# Patient Record
Sex: FEMALE | Race: WHITE | HISPANIC OR LATINO | Employment: FULL TIME | URBAN - METROPOLITAN AREA
[De-identification: names, ages, dates, MRNs, and addresses within clinical notes are randomized per-mention and may not be internally consistent; named-entity substitution may affect disease eponyms.]

---

## 2017-07-18 ENCOUNTER — TRANSCRIBE ORDERS (OUTPATIENT)
Dept: LAB | Facility: HOSPITAL | Age: 32
End: 2017-07-18

## 2017-07-18 ENCOUNTER — LAB (OUTPATIENT)
Dept: LAB | Facility: HOSPITAL | Age: 32
End: 2017-07-18

## 2017-07-18 DIAGNOSIS — Z32.01 PREGNANCY EXAMINATION OR TEST, POSITIVE RESULT: ICD-10-CM

## 2017-07-18 DIAGNOSIS — N91.2 ABSENCE OF MENSTRUATION: ICD-10-CM

## 2017-07-18 DIAGNOSIS — N91.2 ABSENCE OF MENSTRUATION: Primary | ICD-10-CM

## 2017-07-18 LAB
AMORPH URATE CRY URNS QL MICRO: ABNORMAL /HPF
BACTERIA UR QL AUTO: ABNORMAL /HPF
BILIRUB UR QL STRIP: NEGATIVE
CLARITY UR: ABNORMAL
COLOR UR: YELLOW
GLUCOSE UR STRIP-MCNC: NEGATIVE MG/DL
HCG INTACT+B SERPL-ACNC: NORMAL MIU/ML
HGB UR QL STRIP.AUTO: NEGATIVE
HYALINE CASTS UR QL AUTO: ABNORMAL /LPF
KETONES UR QL STRIP: ABNORMAL
LEUKOCYTE ESTERASE UR QL STRIP.AUTO: NEGATIVE
NITRITE UR QL STRIP: NEGATIVE
PH UR STRIP.AUTO: 5.5 [PH] (ref 5–8)
PROGEST SERPL-MCNC: 32.63 NG/ML
PROT UR QL STRIP: NEGATIVE
RBC # UR: ABNORMAL /HPF
REF LAB TEST METHOD: ABNORMAL
SP GR UR STRIP: 1.03 (ref 1–1.03)
SQUAMOUS #/AREA URNS HPF: ABNORMAL /HPF
UROBILINOGEN UR QL STRIP: ABNORMAL
WBC UR QL AUTO: ABNORMAL /HPF

## 2017-07-18 PROCEDURE — 84144 ASSAY OF PROGESTERONE: CPT | Performed by: NURSE PRACTITIONER

## 2017-07-18 PROCEDURE — 84702 CHORIONIC GONADOTROPIN TEST: CPT | Performed by: NURSE PRACTITIONER

## 2017-07-18 PROCEDURE — 81001 URINALYSIS AUTO W/SCOPE: CPT | Performed by: NURSE PRACTITIONER

## 2017-07-18 PROCEDURE — 87086 URINE CULTURE/COLONY COUNT: CPT | Performed by: NURSE PRACTITIONER

## 2017-07-18 PROCEDURE — 36415 COLL VENOUS BLD VENIPUNCTURE: CPT

## 2017-07-20 LAB — BACTERIA SPEC AEROBE CULT: NORMAL

## 2017-07-31 ENCOUNTER — LAB (OUTPATIENT)
Dept: LAB | Facility: HOSPITAL | Age: 32
End: 2017-07-31

## 2017-07-31 ENCOUNTER — TRANSCRIBE ORDERS (OUTPATIENT)
Dept: LAB | Facility: HOSPITAL | Age: 32
End: 2017-07-31

## 2017-07-31 DIAGNOSIS — Z34.81 PRENATAL CARE, SUBSEQUENT PREGNANCY, FIRST TRIMESTER: ICD-10-CM

## 2017-07-31 DIAGNOSIS — Z3A.13 13 WEEKS GESTATION OF PREGNANCY: ICD-10-CM

## 2017-07-31 DIAGNOSIS — Z3A.13 13 WEEKS GESTATION OF PREGNANCY: Primary | ICD-10-CM

## 2017-07-31 LAB
ABO GROUP BLD: NORMAL
AMPHET+METHAMPHET UR QL: NEGATIVE
AMPHETAMINES UR QL: NEGATIVE
BARBITURATES UR QL SCN: NEGATIVE
BASOPHILS # BLD AUTO: 0.01 10*3/MM3 (ref 0–0.2)
BASOPHILS NFR BLD AUTO: 0.1 % (ref 0–1)
BENZODIAZ UR QL SCN: NEGATIVE
BILIRUB UR QL STRIP: NEGATIVE
BLD GP AB SCN SERPL QL: NEGATIVE
BUPRENORPHINE SERPL-MCNC: NEGATIVE NG/ML
CANNABINOIDS SERPL QL: NEGATIVE
CLARITY UR: CLEAR
COCAINE UR QL: NEGATIVE
COLOR UR: YELLOW
DEPRECATED RDW RBC AUTO: 42.4 FL (ref 37–54)
EOSINOPHIL # BLD AUTO: 0.05 10*3/MM3 (ref 0–0.3)
EOSINOPHIL NFR BLD AUTO: 0.7 % (ref 0–3)
ERYTHROCYTE [DISTWIDTH] IN BLOOD BY AUTOMATED COUNT: 13.8 % (ref 11.3–14.5)
EXTERNAL ABO GROUPING: NORMAL
EXTERNAL ANTIBODY SCREEN: NEGATIVE
EXTERNAL CHLAMYDIA SCREEN: NEGATIVE
EXTERNAL GONORRHEA SCREEN: NEGATIVE
EXTERNAL HEPATITIS B SURFACE ANTIGEN: NEGATIVE
EXTERNAL HEPATITIS C AB: NON REACTIVE
EXTERNAL RH FACTOR: POSITIVE
EXTERNAL RUBELLA QUALITATIVE: NORMAL
EXTERNAL SYPHILIS RPR SCREEN: NORMAL
GLUCOSE BLD-MCNC: 76 MG/DL (ref 70–100)
GLUCOSE UR STRIP-MCNC: NEGATIVE MG/DL
HBV SURFACE AG SERPL QL IA: NORMAL
HCT VFR BLD AUTO: 38.9 % (ref 34.5–44)
HCV AB SER DONR QL: NORMAL
HGB BLD-MCNC: 12.9 G/DL (ref 11.5–15.5)
HGB UR QL STRIP.AUTO: NEGATIVE
HIV1 P24 AG SERPL QL IA: NORMAL
HIV1+2 AB SER QL: NORMAL
IMM GRANULOCYTES # BLD: 0.02 10*3/MM3 (ref 0–0.03)
IMM GRANULOCYTES NFR BLD: 0.3 % (ref 0–0.6)
KETONES UR QL STRIP: ABNORMAL
LEUKOCYTE ESTERASE UR QL STRIP.AUTO: NEGATIVE
LYMPHOCYTES # BLD AUTO: 1.67 10*3/MM3 (ref 0.6–4.8)
LYMPHOCYTES NFR BLD AUTO: 21.7 % (ref 24–44)
MCH RBC QN AUTO: 27.9 PG (ref 27–31)
MCHC RBC AUTO-ENTMCNC: 33.2 G/DL (ref 32–36)
MCV RBC AUTO: 84 FL (ref 80–99)
METHADONE UR QL SCN: NEGATIVE
MONOCYTES # BLD AUTO: 0.32 10*3/MM3 (ref 0–1)
MONOCYTES NFR BLD AUTO: 4.2 % (ref 0–12)
NEUTROPHILS # BLD AUTO: 5.62 10*3/MM3 (ref 1.5–8.3)
NEUTROPHILS NFR BLD AUTO: 73 % (ref 41–71)
NITRITE UR QL STRIP: NEGATIVE
OPIATES UR QL: NEGATIVE
OXYCODONE UR QL SCN: NEGATIVE
PCP UR QL SCN: NEGATIVE
PH UR STRIP.AUTO: 6 [PH] (ref 5–8)
PLATELET # BLD AUTO: 226 10*3/MM3 (ref 150–450)
PMV BLD AUTO: 12 FL (ref 6–12)
PROPOXYPH UR QL: NEGATIVE
PROT UR QL STRIP: NEGATIVE
RBC # BLD AUTO: 4.63 10*6/MM3 (ref 3.89–5.14)
RH BLD: POSITIVE
RUBV IGG SER QL: NORMAL
RUBV IGG SER-ACNC: 462.6 IU/ML
SP GR UR STRIP: 1.02 (ref 1–1.03)
TRICYCLICS UR QL SCN: NEGATIVE
UROBILINOGEN UR QL STRIP: ABNORMAL
WBC NRBC COR # BLD: 7.69 10*3/MM3 (ref 3.5–10.8)

## 2017-07-31 PROCEDURE — 87798 DETECT AGENT NOS DNA AMP: CPT | Performed by: OBSTETRICS & GYNECOLOGY

## 2017-07-31 PROCEDURE — 80081 OBSTETRIC PANEL INC HIV TSTG: CPT | Performed by: OBSTETRICS & GYNECOLOGY

## 2017-07-31 PROCEDURE — 87340 HEPATITIS B SURFACE AG IA: CPT | Performed by: OBSTETRICS & GYNECOLOGY

## 2017-07-31 PROCEDURE — 86803 HEPATITIS C AB TEST: CPT | Performed by: OBSTETRICS & GYNECOLOGY

## 2017-07-31 PROCEDURE — 36415 COLL VENOUS BLD VENIPUNCTURE: CPT | Performed by: OBSTETRICS & GYNECOLOGY

## 2017-07-31 PROCEDURE — 82947 ASSAY GLUCOSE BLOOD QUANT: CPT | Performed by: OBSTETRICS & GYNECOLOGY

## 2017-07-31 PROCEDURE — 86901 BLOOD TYPING SEROLOGIC RH(D): CPT | Performed by: OBSTETRICS & GYNECOLOGY

## 2017-07-31 PROCEDURE — 81003 URINALYSIS AUTO W/O SCOPE: CPT | Performed by: OBSTETRICS & GYNECOLOGY

## 2017-07-31 PROCEDURE — 86900 BLOOD TYPING SEROLOGIC ABO: CPT | Performed by: OBSTETRICS & GYNECOLOGY

## 2017-07-31 PROCEDURE — G0432 EIA HIV-1/HIV-2 SCREEN: HCPCS | Performed by: OBSTETRICS & GYNECOLOGY

## 2017-07-31 PROCEDURE — 84439 ASSAY OF FREE THYROXINE: CPT | Performed by: OBSTETRICS & GYNECOLOGY

## 2017-07-31 PROCEDURE — 85025 COMPLETE CBC W/AUTO DIFF WBC: CPT | Performed by: OBSTETRICS & GYNECOLOGY

## 2017-07-31 PROCEDURE — 86850 RBC ANTIBODY SCREEN: CPT | Performed by: OBSTETRICS & GYNECOLOGY

## 2017-07-31 PROCEDURE — 84481 FREE ASSAY (FT-3): CPT | Performed by: OBSTETRICS & GYNECOLOGY

## 2017-07-31 PROCEDURE — 80306 DRUG TEST PRSMV INSTRMNT: CPT | Performed by: OBSTETRICS & GYNECOLOGY

## 2017-07-31 PROCEDURE — 84479 ASSAY OF THYROID (T3 OR T4): CPT | Performed by: OBSTETRICS & GYNECOLOGY

## 2017-08-01 LAB
T3RU NFR SERPL: 23.2 % (ref 23–37)
T4 FREE SERPL-MCNC: 1.67 NG/DL (ref 0.89–1.76)
T4 SERPL-MCNC: 22.2 MCG/DL (ref 4.7–11.4)

## 2017-08-02 LAB
RPR SER QL: NORMAL
T3FREE SERPL-MCNC: 6.7 PG/ML (ref 2–4.4)
ZIKA VIRUS, NAA, SERUM: NEGATIVE
ZIKA VIRUS, NAA, URINE: NEGATIVE

## 2017-08-09 ENCOUNTER — OFFICE VISIT (OUTPATIENT)
Dept: INTERNAL MEDICINE | Facility: CLINIC | Age: 32
End: 2017-08-09

## 2017-08-09 VITALS
BODY MASS INDEX: 22.23 KG/M2 | SYSTOLIC BLOOD PRESSURE: 118 MMHG | DIASTOLIC BLOOD PRESSURE: 60 MMHG | WEIGHT: 130.2 LBS | OXYGEN SATURATION: 98 % | HEIGHT: 64 IN | TEMPERATURE: 97.8 F | RESPIRATION RATE: 16 BRPM | HEART RATE: 88 BPM

## 2017-08-09 DIAGNOSIS — Z76.89 ESTABLISHING CARE WITH NEW DOCTOR, ENCOUNTER FOR: Primary | ICD-10-CM

## 2017-08-09 DIAGNOSIS — Z3A.14 14 WEEKS GESTATION OF PREGNANCY: ICD-10-CM

## 2017-08-09 DIAGNOSIS — E05.00 GRAVES DISEASE: ICD-10-CM

## 2017-08-09 LAB — TSH SERPL DL<=0.05 MIU/L-ACNC: 0 MIU/ML (ref 0.35–5.35)

## 2017-08-09 PROCEDURE — 36415 COLL VENOUS BLD VENIPUNCTURE: CPT | Performed by: NURSE PRACTITIONER

## 2017-08-09 PROCEDURE — 84443 ASSAY THYROID STIM HORMONE: CPT | Performed by: NURSE PRACTITIONER

## 2017-08-09 PROCEDURE — 99203 OFFICE O/P NEW LOW 30 MIN: CPT | Performed by: NURSE PRACTITIONER

## 2017-08-09 RX ORDER — METHIMAZOLE 5 MG/1
TABLET ORAL
Refills: 0 | COMMUNITY
Start: 2017-05-25 | End: 2017-08-09 | Stop reason: DRUGHIGH

## 2017-08-09 RX ORDER — PROPYLTHIOURACIL 50 MG/1
100 TABLET ORAL DAILY
COMMUNITY
End: 2017-08-14 | Stop reason: DRUGHIGH

## 2017-08-09 NOTE — PROGRESS NOTES
Subjective   Estela Angela is a 31 y.o. female here to establish care.  Chief Complaint   Patient presents with   • Established Care     New patient.   • Referral for Endocrinology     Wants you to refer her to a clinic.  And would like to have a yearly physical done soon as well.  She is also pregnant again.       History of Present Illness     Graves disease: She was diagnoses a couple of years ago and  has been being seen by Dr. Memo Sawyer and treated with methimazole which has been effective. She is now 14 weeks pregnant and still taking the methimazole, but has not seen endo since she found out she was pregnant.  Her insurance has changed and Dr. Sawyer office does not accept Passport, so she would like to see another endocrinologist.  She denies tremor, weight loss, palpitations, anxiety, sleep disturbances, heat intolerance, or weakness.       She also has an 11 month old son that she is still breastfeeding.      Pregnant :14 weeks gestation.  Managed by Dr. Fuentes.      Health maintenance:   Influenza: 2016  Tdap: unknown- she will check on this and let me know  Pap: 7/2017     Tobacco use: denies  Eye exam: 2015- has one scheduled for this month  Dental exam: 7/2017      The following portions of the patient's history were reviewed and updated as appropriate: allergies, current medications, past family history, past medical history, past social history, past surgical history and problem list.    Review of Systems   Constitutional: Negative for activity change, appetite change, chills, fatigue, fever and unexpected weight change.   HENT: Negative for congestion, ear discharge, postnasal drip, sinus pressure and sore throat.    Eyes: Negative for discharge and visual disturbance.   Respiratory: Negative for cough and shortness of breath.    Cardiovascular: Negative for chest pain, palpitations and leg swelling.   Gastrointestinal: Negative for abdominal pain, constipation, diarrhea, nausea and vomiting.  "  Endocrine: Negative for cold intolerance, heat intolerance, polydipsia, polyphagia and polyuria.   Genitourinary: Positive for frequency. Negative for difficulty urinating, hematuria, urgency, vaginal bleeding and vaginal discharge.   Musculoskeletal: Negative for arthralgias, joint swelling and myalgias.   Skin: Negative for rash.   Neurological: Negative for dizziness, tremors, weakness, light-headedness and numbness.   Psychiatric/Behavioral: Negative for agitation, decreased concentration and sleep disturbance. The patient is not nervous/anxious.      Blood pressure 118/60, pulse 88, temperature 97.8 °F (36.6 °C), temperature source Temporal Artery , resp. rate 16, height 64\" (162.6 cm), weight 130 lb 3.2 oz (59.1 kg), SpO2 98 %, currently breastfeeding.    No Known Allergies  Past Medical History:   Diagnosis Date   • Graves disease      Past Surgical History:   Procedure Laterality Date   •  SECTION Bilateral 2016    Procedure:  SECTION PRIMARY;  Surgeon: Julissa Fuentes MD;  Location: [facility] LABOR DELIVERY;  Service:    • WISDOM TOOTH EXTRACTION       Family History   Problem Relation Age of Onset   • Diabetes Mother    • Hypertension Mother    • Hyperlipidemia Mother    • Other Father      MVA   • No Known Problems Sister    • No Known Problems Brother    • Diabetes Maternal Grandmother    • No Known Problems Maternal Grandfather    • Stroke Paternal Grandfather    • No Known Problems Son      11 months old     Social History     Social History   • Marital status:      Spouse name: N/A   • Number of children: N/A   • Years of education: N/A     Occupational History   •       Social History Main Topics   • Smoking status: Never Smoker   • Smokeless tobacco: Never Used   • Alcohol use No   • Drug use: No   • Sexual activity: Yes     Partners: Male     Birth control/ protection: None     Other Topics Concern   • Not on file     Social History Narrative   • No " narrative on file       There is no immunization history on file for this patient.    Current Outpatient Prescriptions:   •  Prenatal Vit-Fe Fumarate-FA (PRENATAL 27-1) 27-1 MG tablet tablet, Take 1 tablet by mouth daily., Disp: 90 tablet, Rfl: 3  •  propylthiouracil (PTU) 50 MG tablet, Take 100 mg by mouth 2 (Two) Times a Day., Disp: , Rfl:     Objective   Physical Exam   Constitutional: She is oriented to person, place, and time. She appears well-developed and well-nourished.   HENT:   Head: Normocephalic and atraumatic.   Eyes: EOM and lids are normal. Pupils are equal, round, and reactive to light.   No exopthalmos   Neck: Normal range of motion. No JVD present. No thyromegaly present.   Cardiovascular: Normal rate and regular rhythm.    Pulmonary/Chest: Effort normal and breath sounds normal. No respiratory distress.   Abdominal: Soft. Bowel sounds are normal. There is no tenderness.   Musculoskeletal: She exhibits no edema.   Lymphadenopathy:     She has no cervical adenopathy.   Neurological: She is alert and oriented to person, place, and time.   Skin: Skin is warm and dry.   Psychiatric: She has a normal mood and affect. Her behavior is normal.   Nursing note and vitals reviewed.      Assessment/Plan   Estela was seen today for established care and referral for endocrinology.    Diagnoses and all orders for this visit:    Establishing care with new doctor, encounter for    Graves disease  -     Ambulatory Referral to Endocrinology  -     TSH    14 weeks gestation of pregnancy        Outpatient Encounter Prescriptions as of 8/9/2017   Medication Sig Dispense Refill   • Prenatal Vit-Fe Fumarate-FA (PRENATAL 27-1) 27-1 MG tablet tablet Take 1 tablet by mouth daily. 90 tablet 3   • propylthiouracil (PTU) 50 MG tablet Take 100 mg by mouth 2 (Two) Times a Day.     • [DISCONTINUED] methimazole (TAPAZOLE) 10 MG tablet Take 10 mg by mouth daily. Takes 1 and 1/2 tabs daily     • [DISCONTINUED] docusate sodium 100 MG  capsule Take 100 mg by mouth 2 (two) times a day. 30 capsule 1   • [DISCONTINUED] ibuprofen (ADVIL,MOTRIN) 600 MG tablet Take 1 tablet by mouth every 8 (eight) hours as needed for mild pain (1-3). 60 tablet 1   • [DISCONTINUED] methIMAzole (TAPAZOLE) 5 MG tablet take 2 tablets by mouth once daily  0     No facility-administered encounter medications on file as of 8/9/2017.             Prenatal care to be continued by Dr. Fuentes. Continue prenatal vitamin.  Discussed hyperthyroid mgmt and pregnancy/breastfeeding with Dr. Memo Sawyer office.  They tell me that patient was supposed to be taking propylthiouracil (PTU) 50 mg 2 pills BID and not the methimazole.  Their goal was to keep her TSH at around 2.1 during pregnancy and to check her TSH every 2 months.  I also spoke with [facility]  and she does have a prescription already there for the PTU at this dose.  Spoke with Kitty at Dr. Fuentes's office.  They recommended referral to new endocrinologist, they are aware of her labs, and will follow her until she see's her new endocrinologist.   Will have her stop the methimazole and start the PTU.  I have referred her to a new endocrinologist. Labs were drawn at Dr Fuentes's office on 7/31/2017.  I have also added a TSH today as I do not see one resulted.    RTC 1 month for PE or sooner with any problems  Plan of care discussed with pt. She verbalized understanding and agreement.

## 2017-08-14 ENCOUNTER — OFFICE VISIT (OUTPATIENT)
Dept: ENDOCRINOLOGY | Facility: CLINIC | Age: 32
End: 2017-08-14

## 2017-08-14 ENCOUNTER — TELEPHONE (OUTPATIENT)
Dept: INTERNAL MEDICINE | Facility: CLINIC | Age: 32
End: 2017-08-14

## 2017-08-14 VITALS
HEIGHT: 64 IN | DIASTOLIC BLOOD PRESSURE: 68 MMHG | SYSTOLIC BLOOD PRESSURE: 118 MMHG | WEIGHT: 130 LBS | OXYGEN SATURATION: 100 % | BODY MASS INDEX: 22.2 KG/M2 | HEART RATE: 104 BPM

## 2017-08-14 DIAGNOSIS — E05.90 HYPERTHYROIDISM AFFECTING PREGNANCY IN SECOND TRIMESTER: Primary | ICD-10-CM

## 2017-08-14 DIAGNOSIS — O99.282 HYPERTHYROIDISM AFFECTING PREGNANCY IN SECOND TRIMESTER: Primary | ICD-10-CM

## 2017-08-14 DIAGNOSIS — E05.00 GRAVES DISEASE: ICD-10-CM

## 2017-08-14 PROCEDURE — 99243 OFF/OP CNSLTJ NEW/EST LOW 30: CPT | Performed by: INTERNAL MEDICINE

## 2017-08-14 RX ORDER — METHIMAZOLE 10 MG/1
10 TABLET ORAL DAILY
Qty: 30 TABLET | Refills: 5 | Status: SHIPPED | OUTPATIENT
Start: 2017-08-14 | End: 2017-11-29 | Stop reason: DRUGHIGH

## 2017-08-14 NOTE — PROGRESS NOTES
Chief Complaint   Patient presents with   • Graves' Disease     Consult for Dr. Fuentes     HPI:   Estela Angela is a 31 y.o.female sent in consultation by Julissa Fuentes MD for further evaluation of graves disease during pregnancy. Her history is as follows:    - pt's insurance no longer accepted by former endocrinologist. Here to establish care with new endocrinologist     1) Graves disease:   - first diagnosed in , by Dr. Merritt. Has been thionamide therapy (mainly methimazole) since . Presented with palpitations, weight loss, and tremor.  - In 2016 had her first pregnancy (took PTU during that pregnancy)  - Pt is currently 15 weeks pregnant with her second child. ALDO 2017. Was on methimazole for a few weeks in the first trimester.  Currently on  mg daily    Overall feels well  Review of Systems   Constitutional: Positive for fatigue.   HENT: Negative.    Eyes: Negative.    Respiratory: Negative.    Cardiovascular: Negative.    Gastrointestinal: Positive for nausea. Negative for vomiting.   Endocrine: Negative.    Genitourinary: Negative.    Musculoskeletal: Negative.    Skin: Negative.    Allergic/Immunologic: Negative.    Neurological: Negative.    Hematological: Negative.    Psychiatric/Behavioral: Negative.      Past Medical History:   Diagnosis Date   • Graves disease      family history includes Diabetes in her maternal grandmother and mother; Hyperlipidemia in her mother; Hypertension in her mother; No Known Problems in her brother, maternal grandfather, sister, and son; Other in her father; Stroke in her paternal grandfather.  Past Surgical History:   Procedure Laterality Date   •  SECTION Bilateral 2016    Procedure:  SECTION PRIMARY;  Surgeon: Julissa Fuentes MD;  Location: [facility] LABOR DELIVERY;  Service:    • WISDOM TOOTH EXTRACTION       Social History   Substance Use Topics   • Smoking status: Never Smoker   • Smokeless tobacco: Never Used   •  "Alcohol use No     Current Outpatient Prescriptions:   •  Prenatal Vit-Fe Fumarate-FA (PRENATAL 27-1) 27-1 MG tablet tablet, Take 1 tablet by mouth daily., Disp: 90 tablet, Rfl: 3  •   mg daily  No Known Allergies    /68  Pulse 104  Ht 64\" (162.6 cm)  Wt 130 lb (59 kg)  SpO2 100%  BMI 22.31 kg/m2  Physical Exam   Constitutional: She is oriented to person, place, and time. She appears well-developed. No distress.   HENT:   Head: Normocephalic.   Mouth/Throat: Oropharynx is clear and moist.   Eyes: Conjunctivae and EOM are normal. Pupils are equal, round, and reactive to light.   Neck: No tracheal deviation present. Thyromegaly (mild) present.   No palpable thyroid nodules     Cardiovascular: Normal rate, regular rhythm and normal heart sounds.    No murmur heard.  Pulmonary/Chest: Effort normal and breath sounds normal. No respiratory distress.   Abdominal:   Gravid uterus   Lymphadenopathy:     She has no cervical adenopathy.   Neurological: She is alert and oriented to person, place, and time. No cranial nerve deficit.   Skin: Skin is warm and dry. She is not diaphoretic. No erythema.   Psychiatric: She has a normal mood and affect. Her behavior is normal.   Vitals reviewed.    LABS/IMAGING: outside records reviewed and summarized in HPI  Results for orders placed or performed in visit on 08/09/17   TSH   Result Value Ref Range    TSH 0.004 (L) 0.350 - 5.350 mIU/mL     07/31/2017: free T4 1.67  07/31/2017: free T3 6.7 (2.0 - 4.4), T3 uptake 23.2 (23- 37%)    ASSESSMENT/PLAN:  1) hyperthyroidism affecting pregnancy in second trimester, 2) Graves disease:  - pt clinically euthyroid on exam  - discussed current American Thyroid Association Guidelines for management of hyperthyroidism during pregnancy. Recommend changing her thionamide to methimazole now that she is in the second trimester  - overall goal will be to keep her TSH in the low end of the normal range (< 2.00) during her pregnancy.   - " stop PTU and start methimazole 10 mg daily  - Reviewed potential (rare) side effects of methimazole and reviewed potential symptoms. Pt instructed to stop drug and call the clinic ASAP if these symptoms occur.     - pt to RTC in 8 weeks

## 2017-08-14 NOTE — TELEPHONE ENCOUNTER
Pharmacy had a question in regards to the patient's methimazole 10 mg. Pharmacy would like to get a call back at [phone]

## 2017-08-25 ENCOUNTER — APPOINTMENT (OUTPATIENT)
Dept: LAB | Facility: HOSPITAL | Age: 32
End: 2017-08-25

## 2017-08-25 ENCOUNTER — TRANSCRIBE ORDERS (OUTPATIENT)
Dept: LAB | Facility: HOSPITAL | Age: 32
End: 2017-08-25

## 2017-08-25 DIAGNOSIS — R30.0 DYSURIA: Primary | ICD-10-CM

## 2017-08-25 LAB
BACTERIA UR QL AUTO: ABNORMAL /HPF
BILIRUB UR QL STRIP: ABNORMAL
CLARITY UR: ABNORMAL
COLOR UR: ABNORMAL
GLUCOSE UR STRIP-MCNC: NEGATIVE MG/DL
HGB UR QL STRIP.AUTO: NEGATIVE
HYALINE CASTS UR QL AUTO: ABNORMAL /LPF
KETONES UR QL STRIP: ABNORMAL
LEUKOCYTE ESTERASE UR QL STRIP.AUTO: ABNORMAL
NITRITE UR QL STRIP: POSITIVE
PH UR STRIP.AUTO: <=5 [PH] (ref 5–8)
PROT UR QL STRIP: ABNORMAL
RBC # UR: ABNORMAL /HPF
REF LAB TEST METHOD: ABNORMAL
SP GR UR STRIP: 1.01 (ref 1–1.03)
SQUAMOUS #/AREA URNS HPF: ABNORMAL /HPF
UROBILINOGEN UR QL STRIP: ABNORMAL
WBC UR QL AUTO: ABNORMAL /HPF

## 2017-08-25 PROCEDURE — 81001 URINALYSIS AUTO W/SCOPE: CPT | Performed by: OBSTETRICS & GYNECOLOGY

## 2017-08-25 PROCEDURE — 87086 URINE CULTURE/COLONY COUNT: CPT | Performed by: OBSTETRICS & GYNECOLOGY

## 2017-08-27 LAB — BACTERIA SPEC AEROBE CULT: NORMAL

## 2017-09-13 ENCOUNTER — TELEPHONE (OUTPATIENT)
Dept: INTERNAL MEDICINE | Facility: CLINIC | Age: 32
End: 2017-09-13

## 2017-09-13 ENCOUNTER — OFFICE VISIT (OUTPATIENT)
Dept: INTERNAL MEDICINE | Facility: CLINIC | Age: 32
End: 2017-09-13

## 2017-09-13 VITALS
BODY MASS INDEX: 23.08 KG/M2 | HEART RATE: 85 BPM | DIASTOLIC BLOOD PRESSURE: 60 MMHG | WEIGHT: 135.2 LBS | HEIGHT: 64 IN | SYSTOLIC BLOOD PRESSURE: 116 MMHG | TEMPERATURE: 97.6 F | OXYGEN SATURATION: 99 %

## 2017-09-13 DIAGNOSIS — Z00.00 ANNUAL PHYSICAL EXAM: Primary | ICD-10-CM

## 2017-09-13 DIAGNOSIS — E05.00 GRAVES DISEASE: ICD-10-CM

## 2017-09-13 DIAGNOSIS — Z3A.19 19 WEEKS GESTATION OF PREGNANCY: ICD-10-CM

## 2017-09-13 DIAGNOSIS — Z23 NEED FOR VACCINATION: ICD-10-CM

## 2017-09-13 DIAGNOSIS — O26.812 PREGNANCY RELATED FATIGUE IN SECOND TRIMESTER: ICD-10-CM

## 2017-09-13 DIAGNOSIS — Z86.39 HISTORY OF HYPERLIPIDEMIA: ICD-10-CM

## 2017-09-13 LAB
25(OH)D3 SERPL-MCNC: 25.8 NG/ML
ALBUMIN SERPL-MCNC: 3.7 G/DL (ref 3.2–4.8)
ALBUMIN/GLOB SERPL: 1.4 G/DL (ref 1.5–2.5)
ALP SERPL-CCNC: 75 U/L (ref 25–100)
ALT SERPL W P-5'-P-CCNC: 10 U/L (ref 7–40)
ANION GAP SERPL CALCULATED.3IONS-SCNC: 6 MMOL/L (ref 3–11)
AST SERPL-CCNC: 11 U/L (ref 0–33)
BASOPHILS # BLD AUTO: 0.01 10*3/MM3 (ref 0–0.2)
BASOPHILS NFR BLD AUTO: 0.1 % (ref 0–1)
BILIRUB BLD-MCNC: NEGATIVE MG/DL
BILIRUB SERPL-MCNC: 0.4 MG/DL (ref 0.3–1.2)
BUN BLD-MCNC: 8 MG/DL (ref 9–23)
BUN/CREAT SERPL: 20 (ref 7–25)
CALCIUM SPEC-SCNC: 8.8 MG/DL (ref 8.7–10.4)
CHLORIDE SERPL-SCNC: 103 MMOL/L (ref 99–109)
CHOLEST SERPL-MCNC: 274 MG/DL (ref 0–200)
CLARITY, POC: CLEAR
CO2 SERPL-SCNC: 26 MMOL/L (ref 20–31)
COLOR UR: YELLOW
CREAT BLD-MCNC: 0.4 MG/DL (ref 0.6–1.3)
DEPRECATED RDW RBC AUTO: 43.5 FL (ref 37–54)
EOSINOPHIL # BLD AUTO: 0.12 10*3/MM3 (ref 0–0.3)
EOSINOPHIL NFR BLD AUTO: 1.4 % (ref 0–3)
ERYTHROCYTE [DISTWIDTH] IN BLOOD BY AUTOMATED COUNT: 13.6 % (ref 11.3–14.5)
GFR SERPL CREATININE-BSD FRML MDRD: >150 ML/MIN/1.73
GLOBULIN UR ELPH-MCNC: 2.6 GM/DL
GLUCOSE BLD-MCNC: 79 MG/DL (ref 70–100)
GLUCOSE UR STRIP-MCNC: NEGATIVE MG/DL
HCT VFR BLD AUTO: 36.1 % (ref 34.5–44)
HGB BLD-MCNC: 11.9 G/DL (ref 11.5–15.5)
IMM GRANULOCYTES # BLD: 0.04 10*3/MM3 (ref 0–0.03)
IMM GRANULOCYTES NFR BLD: 0.5 % (ref 0–0.6)
KETONES UR QL: NEGATIVE
LEUKOCYTE EST, POC: NEGATIVE
LYMPHOCYTES # BLD AUTO: 1.87 10*3/MM3 (ref 0.6–4.8)
LYMPHOCYTES NFR BLD AUTO: 21.6 % (ref 24–44)
MCH RBC QN AUTO: 28.7 PG (ref 27–31)
MCHC RBC AUTO-ENTMCNC: 33 G/DL (ref 32–36)
MCV RBC AUTO: 87.2 FL (ref 80–99)
MONOCYTES # BLD AUTO: 0.37 10*3/MM3 (ref 0–1)
MONOCYTES NFR BLD AUTO: 4.3 % (ref 0–12)
NEUTROPHILS # BLD AUTO: 6.25 10*3/MM3 (ref 1.5–8.3)
NEUTROPHILS NFR BLD AUTO: 72.1 % (ref 41–71)
NITRITE UR-MCNC: NEGATIVE MG/ML
PH UR: 8 [PH] (ref 5–8)
PLATELET # BLD AUTO: 230 10*3/MM3 (ref 150–450)
PMV BLD AUTO: 11.8 FL (ref 6–12)
POTASSIUM BLD-SCNC: 3.6 MMOL/L (ref 3.5–5.5)
PROT SERPL-MCNC: 6.3 G/DL (ref 5.7–8.2)
PROT UR STRIP-MCNC: NEGATIVE MG/DL
RBC # BLD AUTO: 4.14 10*6/MM3 (ref 3.89–5.14)
RBC # UR STRIP: NEGATIVE /UL
SODIUM BLD-SCNC: 135 MMOL/L (ref 132–146)
SP GR UR: 1.01 (ref 1–1.03)
TSH SERPL DL<=0.05 MIU/L-ACNC: <0.004 MIU/ML (ref 0.35–5.35)
UROBILINOGEN UR QL: NORMAL
VIT B12 BLD-MCNC: 339 PG/ML (ref 211–911)
WBC NRBC COR # BLD: 8.66 10*3/MM3 (ref 3.5–10.8)

## 2017-09-13 PROCEDURE — 82607 VITAMIN B-12: CPT | Performed by: NURSE PRACTITIONER

## 2017-09-13 PROCEDURE — 99395 PREV VISIT EST AGE 18-39: CPT | Performed by: NURSE PRACTITIONER

## 2017-09-13 PROCEDURE — 85025 COMPLETE CBC W/AUTO DIFF WBC: CPT | Performed by: NURSE PRACTITIONER

## 2017-09-13 PROCEDURE — 82306 VITAMIN D 25 HYDROXY: CPT | Performed by: NURSE PRACTITIONER

## 2017-09-13 PROCEDURE — 81003 URINALYSIS AUTO W/O SCOPE: CPT | Performed by: NURSE PRACTITIONER

## 2017-09-13 PROCEDURE — 82465 ASSAY BLD/SERUM CHOLESTEROL: CPT | Performed by: NURSE PRACTITIONER

## 2017-09-13 PROCEDURE — 80053 COMPREHEN METABOLIC PANEL: CPT | Performed by: NURSE PRACTITIONER

## 2017-09-13 PROCEDURE — 90471 IMMUNIZATION ADMIN: CPT | Performed by: NURSE PRACTITIONER

## 2017-09-13 PROCEDURE — 84443 ASSAY THYROID STIM HORMONE: CPT | Performed by: NURSE PRACTITIONER

## 2017-09-13 PROCEDURE — 90686 IIV4 VACC NO PRSV 0.5 ML IM: CPT | Performed by: NURSE PRACTITIONER

## 2017-09-13 RX ORDER — CALCIUM CITRATE, IRON PENTACARBONYL, CHOLECALCIFEROL, .ALPHA.-TOCOPHEROL, DL-, PYRIDOXINE HYDROCHLORIDE, FOLIC ACID, DOCUSATE SODIUM, AND DOCONEXENT 104; 27; 400; 30; 25; 1; 50; 260 MG/1; MG/1; [IU]/1; [IU]/1; MG/1; MG/1; MG/1; MG/1
1 CAPSULE, GELATIN COATED ORAL DAILY
Refills: 0 | COMMUNITY
Start: 2017-08-16 | End: 2017-10-11 | Stop reason: SDUPTHER

## 2017-09-13 NOTE — PROGRESS NOTES
Patient Care Team:  SHARON Rothman as PCP - General (Nurse Practitioner)  No Known Provider as PCP - Family Medicine  Julissa Fuentes MD as Consulting Physician (Obstetrics and Gynecology)  Vidya Sousa MD as Consulting Physician (Endocrinology)     Chief complaint: Patient is in today for a physical and has no acute complaints        Patient is a 32 y.o. female who presents for her yearly physical exam.     HPI     Pregnancy-19 Weeks gestation.  ALDO 2/6/2018.  This is her second pregnancy, it has been going well.  She is followed by Dr Clements; next appt is 9/29/17. She will have US at that appt.      Graves disease- diagnosed by Dr. Merritt in 2013 when she presented with palpitations, weight loss, and tremor.  Saw Dr Sousa  on 8/14/2017.  She changed her from PTU to Methimazole since she is now in her second trimester.  Goal id to keep TSH <2.0.  Her next appt  with her is 10/11/17.  Felling mildly fatigued, but good overall.     Diet: Has been trying to eat healthier, but she had a lot of nausea in the first trimester, has resolved now, and is having a lot of cravings for pop tarts and ramen noodles, and other junk foods.   Exercise: Occasional walks     Health maintenance:  Influenza: will update today   Tdap: will need updates at 27-36 weeks gestation per OB  Pap: 7/2017  Tobacco use:denies  Eye exam: 8/2017      Review of Systems   Constitutional: Positive for fatigue (mild). Negative for chills, diaphoresis and fever.   HENT: Negative for congestion, ear discharge, ear pain, postnasal drip, rhinorrhea, sinus pressure, sneezing, sore throat and trouble swallowing.    Eyes: Negative for discharge and visual disturbance.   Respiratory: Negative for cough, chest tightness, shortness of breath and wheezing.    Cardiovascular: Negative for chest pain, palpitations and leg swelling.   Gastrointestinal: Negative for abdominal pain, blood in stool, constipation, diarrhea, nausea and vomiting.   Endocrine:  Negative for cold intolerance, heat intolerance, polydipsia, polyphagia and polyuria.   Genitourinary: Negative for difficulty urinating, dysuria, flank pain, frequency, pelvic pain, vaginal bleeding, vaginal discharge and vaginal pain.   Musculoskeletal: Negative for arthralgias, joint swelling and myalgias.   Skin: Negative for pallor, rash and wound.   Neurological: Negative for dizziness, weakness, light-headedness and headaches.   Hematological: Negative for adenopathy. Does not bruise/bleed easily.   Psychiatric/Behavioral: Negative for decreased concentration, dysphoric mood, self-injury, sleep disturbance and suicidal ideas. The patient is not nervous/anxious.        History  Past Medical History:   Diagnosis Date   • Graves disease       Past Surgical History:   Procedure Laterality Date   •  SECTION Bilateral 2016    Procedure:  SECTION PRIMARY;  Surgeon: Julissa Fuentes MD;  Location: [facility] LABOR DELIVERY;  Service:    • WISDOM TOOTH EXTRACTION        No Known Allergies   Family History   Problem Relation Age of Onset   • Diabetes Mother    • Hypertension Mother    • Hyperlipidemia Mother    • Other Father      MVA   • No Known Problems Sister    • No Known Problems Brother    • Diabetes Maternal Grandmother    • No Known Problems Maternal Grandfather    • Stroke Paternal Grandfather    • No Known Problems Son      11 months old     Social History     Social History   • Marital status:      Spouse name: N/A   • Number of children: N/A   • Years of education: N/A     Occupational History   •       Social History Main Topics   • Smoking status: Never Smoker   • Smokeless tobacco: Never Used   • Alcohol use No   • Drug use: No   • Sexual activity: Yes     Partners: Male     Birth control/ protection: None     Other Topics Concern   • Not on file     Social History Narrative        Current Outpatient Prescriptions:   •  methIMAzole (TAPAZOLE) 10 MG tablet,  "Take 1 tablet by mouth Daily., Disp: 30 tablet, Rfl: 5  •  Prenat-FeFmCb-DSS-FA-DHA w/o A (CITRANATAL HARMONY) 27-1-260 MG capsule, Take 1 capsule by mouth Daily. Take for prenatal, Disp: , Rfl: 0   Immunization History   Administered Date(s) Administered   • Flu Vaccine Quad PF >36MO 09/13/2017                   /60  Pulse 85  Temp 97.6 °F (36.4 °C)  Ht 64\" (162.6 cm)  Wt 135 lb 3.2 oz (61.3 kg)  SpO2 99%  Breastfeeding? Yes  BMI 23.21 kg/m2      Physical Exam   Constitutional: She is oriented to person, place, and time. She appears well-developed and well-nourished. No distress.   HENT:   Head: Normocephalic and atraumatic.   Right Ear: Hearing, tympanic membrane, external ear and ear canal normal.   Left Ear: Hearing, tympanic membrane, external ear and ear canal normal.   Nose: Nose normal. Right sinus exhibits no maxillary sinus tenderness and no frontal sinus tenderness. Left sinus exhibits no maxillary sinus tenderness and no frontal sinus tenderness.   Mouth/Throat: Uvula is midline, oropharynx is clear and moist and mucous membranes are normal.   Eyes: Conjunctivae, EOM and lids are normal. Pupils are equal, round, and reactive to light.   No exopthalmos   Neck: Normal range of motion. No JVD present. Carotid bruit is not present. Thyromegaly (mild) present. No thyroid mass present.   Cardiovascular: Normal rate, regular rhythm and normal heart sounds.  PMI is not displaced.    Pulses:       Carotid pulses are 2+ on the right side, and 2+ on the left side.       Radial pulses are 2+ on the right side, and 2+ on the left side.        Femoral pulses are 2+ on the right side, and 2+ on the left side.       Popliteal pulses are 2+ on the right side, and 2+ on the left side.        Dorsalis pedis pulses are 2+ on the right side, and 2+ on the left side.        Posterior tibial pulses are 2+ on the right side, and 2+ on the left side.   Pulmonary/Chest: Effort normal and breath sounds normal. No " respiratory distress. Right breast exhibits no inverted nipple, no mass and no nipple discharge. Left breast exhibits no inverted nipple, no mass, no nipple discharge and no skin change. Breasts are symmetrical. There is no breast swelling.   Abdominal: Soft. Bowel sounds are normal. She exhibits no abdominal bruit. There is no hepatosplenomegaly. There is no tenderness. There is no CVA tenderness.   Gravid uterus, fundal height just below umbilicus   Genitourinary: There is breast tenderness. No breast discharge or bleeding.   Musculoskeletal: Normal range of motion. She exhibits no edema, tenderness or deformity.   Lymphadenopathy:     She has no cervical adenopathy.   Neurological: She is alert and oriented to person, place, and time. She has normal strength and normal reflexes. No cranial nerve deficit or sensory deficit. Coordination normal. She displays no Babinski's sign on the right side. She displays no Babinski's sign on the left side.   Skin: Skin is warm and dry.   Psychiatric: She has a normal mood and affect. Her behavior is normal.   Nursing note and vitals reviewed.              Diagnoses and all orders for this visit:    Annual physical exam  -     POCT urinalysis dipstick, automated  -     Comprehensive Metabolic Panel  -     Vitamin B12  -     Vitamin D 25 Hydroxy  -     Cholesterol, Total  -     CBC & Differential  -     CBC Auto Differential    Graves disease  -     TSH    19 weeks gestation of pregnancy    History of hyperlipidemia  -     Cholesterol, Total    Need for vaccination  -     Flu Vaccine Quad PF 3YR+    Pregnancy related fatigue in second trimester  -     Vitamin B12  -     Vitamin D 25 Hydroxy  -     CBC & Differential  -     CBC Auto Differential    Other orders  -     Prenat-FeFmCb-DSS-FA-DHA w/o A (CITRANATAL HARMONY) 27-1-260 MG capsule; Take 1 capsule by mouth Daily. Take for prenatal       Urine negative today  Labs sent  Immunizations and screenings: flu vaccine updated  today, will need Tdap at 27-36 weeks gestation, she will get with OB.  Counseling: diet and exercise  Continue to follow with Dr. Sousa for Graves and Dr. Clements for pregnancy  Follow up: 3 months or sooner with any problems    Hanna De La Vega, APRN   9/13/2017   10:52 AM

## 2017-09-13 NOTE — TELEPHONE ENCOUNTER
----- Message from SHARON Rothman sent at 9/13/2017  1:40 PM EDT -----  Please let her know labs looked pretty good except vit D a little low.  She can take 0473-4096 units  of OTC D3 daily.  Total cholesterol is a bit high.  She need to work on proper dietary choices and a little more light exercise.. We can recheck lipids after she delivers.  No meds for this during pregnancy.

## 2017-10-11 ENCOUNTER — OFFICE VISIT (OUTPATIENT)
Dept: ENDOCRINOLOGY | Facility: CLINIC | Age: 32
End: 2017-10-11

## 2017-10-11 VITALS
DIASTOLIC BLOOD PRESSURE: 60 MMHG | HEART RATE: 61 BPM | OXYGEN SATURATION: 99 % | BODY MASS INDEX: 24.04 KG/M2 | WEIGHT: 140.8 LBS | SYSTOLIC BLOOD PRESSURE: 100 MMHG | HEIGHT: 64 IN

## 2017-10-11 DIAGNOSIS — E05.90 HYPERTHYROIDISM AFFECTING PREGNANCY IN SECOND TRIMESTER: Primary | ICD-10-CM

## 2017-10-11 DIAGNOSIS — O99.282 HYPERTHYROIDISM AFFECTING PREGNANCY IN SECOND TRIMESTER: Primary | ICD-10-CM

## 2017-10-11 LAB
T4 FREE SERPL-MCNC: 1.17 NG/DL (ref 0.89–1.76)
TSH SERPL DL<=0.05 MIU/L-ACNC: <0.004 MIU/ML (ref 0.35–5.35)

## 2017-10-11 PROCEDURE — 84439 ASSAY OF FREE THYROXINE: CPT | Performed by: INTERNAL MEDICINE

## 2017-10-11 PROCEDURE — 84443 ASSAY THYROID STIM HORMONE: CPT | Performed by: INTERNAL MEDICINE

## 2017-10-11 PROCEDURE — 99213 OFFICE O/P EST LOW 20 MIN: CPT | Performed by: INTERNAL MEDICINE

## 2017-10-11 RX ORDER — MULTIVIT 47/IRON/FOLATE 1/DHA 27-1-300MG
CAPSULE ORAL
Refills: 0 | COMMUNITY
Start: 2017-10-02 | End: 2017-11-29

## 2017-10-11 NOTE — PROGRESS NOTES
"Chief Complaint   Patient presents with   • Hyperthyroidism     Follow Up     HPI:   Estela Angela is a 32 y.o.female who returns to Endocrine Clinic for f/u evaluation of graves disease during pregnancy. Last visit 08/14/2017. Her history is as follows:    Interim Events:   - currently 23 weeks gestation, ALDO 02/06/2017.   - overall feeling well. No complications with pregnancy to date.     1) Graves disease:   - first diagnosed in 2013, by Dr. Merritt. Has been on thionamide therapy intermittently (mainly methimazole) since 2013. Presented with palpitations, weight loss, and tremor.  - In 09/2016 had her first pregnancy (took PTU during that pregnancy)  - Was on methimazole for a few weeks in the first trimester. Was switched to PTU. At 15 weeks, I switched to methimazole per current guidelines.     Currently on Methimazole 10 mg daily. Rarely has missed a dose    Overall feels well  Review of Systems   Constitutional: Positive for fatigue (less than in first trimester).   HENT: Negative.    Eyes: Negative.    Respiratory: Negative.    Cardiovascular: Negative.    Gastrointestinal: Negative.  Negative for nausea and vomiting.   Endocrine: Negative.    Genitourinary: Negative.    Musculoskeletal: Negative.    Skin: Negative.    Allergic/Immunologic: Negative.    Neurological: Negative.    Hematological: Negative.    Psychiatric/Behavioral: Negative.      The following portions of the patient's history were reviewed and updated as appropriate: allergies, current medications, past family history, past medical history, past social history, past surgical history and problem list.    /60  Pulse 61  Ht 64\" (162.6 cm)  Wt 140 lb 12.8 oz (63.9 kg)  SpO2 99%  BMI 24.17 kg/m2  Physical Exam   Constitutional: She is oriented to person, place, and time. She appears well-developed. No distress.   HENT:   Head: Normocephalic.   Mouth/Throat: Oropharynx is clear and moist.   Eyes: Conjunctivae and EOM are normal. " Pupils are equal, round, and reactive to light.   Neck: No tracheal deviation present. Thyromegaly (mild) present.   No palpable thyroid nodules     Cardiovascular: Normal rate, regular rhythm and normal heart sounds.    No murmur heard.  Pulmonary/Chest: Effort normal and breath sounds normal. No respiratory distress.   Abdominal:   Gravid uterus   Lymphadenopathy:     She has no cervical adenopathy.   Neurological: She is alert and oriented to person, place, and time. No cranial nerve deficit.   Skin: Skin is warm and dry. She is not diaphoretic. No erythema.   Psychiatric: She has a normal mood and affect. Her behavior is normal.   Vitals reviewed.    LABS/IMAGING: outside records reviewed and summarized in HPI  Results for orders placed or performed in visit on 10/11/17   TSH   Result Value Ref Range    TSH <0.004 (L) 0.350 - 5.350 mIU/mL   T4, Free   Result Value Ref Range    Free T4 1.17 0.89 - 1.76 ng/dL     ASSESSMENT/PLAN:  1) hyperthyroidism affecting pregnancy in second trimester, 2) Graves disease:  - pt clinically euthyroid on exam  - overall goal will be to keep her TSH in the low end of the normal range (< 2.00) during her pregnancy.   - TFT's checked today were reviewed. Although TSH is suppressed her free T4 level is normal.  - continue methimazole 10 mg daily. Will not increase dose to avoid hypothyroidism.  - Reviewed potential (rare) side effects of methimazole and reviewed potential symptoms. Pt instructed to stop drug and call the clinic ASAP if these symptoms occur. Recent (09/2017) CBC and CMP WNL    - pt to RTC in 7 weeks

## 2017-11-14 ENCOUNTER — TELEPHONE (OUTPATIENT)
Dept: INTERNAL MEDICINE | Facility: CLINIC | Age: 32
End: 2017-11-14

## 2017-11-14 DIAGNOSIS — E05.00 GRAVES DISEASE: Primary | ICD-10-CM

## 2017-11-15 ENCOUNTER — TELEPHONE (OUTPATIENT)
Dept: ENDOCRINOLOGY | Facility: CLINIC | Age: 32
End: 2017-11-15

## 2017-11-15 ENCOUNTER — LAB (OUTPATIENT)
Dept: INTERNAL MEDICINE | Facility: CLINIC | Age: 32
End: 2017-11-15

## 2017-11-15 DIAGNOSIS — E05.00 GRAVES DISEASE: ICD-10-CM

## 2017-11-15 LAB
T4 FREE SERPL-MCNC: 0.81 NG/DL (ref 0.89–1.76)
TSH SERPL DL<=0.05 MIU/L-ACNC: 0.44 MIU/ML (ref 0.35–5.35)

## 2017-11-15 PROCEDURE — 84443 ASSAY THYROID STIM HORMONE: CPT | Performed by: INTERNAL MEDICINE

## 2017-11-15 PROCEDURE — 84439 ASSAY OF FREE THYROXINE: CPT | Performed by: INTERNAL MEDICINE

## 2017-11-15 NOTE — TELEPHONE ENCOUNTER
Spoke to patient about lab results. Instructed pt to decrease dose of methimazole to 5 mg daily. Has f/u with me 11/29/2017  Vidya Sousa MD

## 2017-11-21 ENCOUNTER — TRANSCRIBE ORDERS (OUTPATIENT)
Dept: LAB | Facility: HOSPITAL | Age: 32
End: 2017-11-21

## 2017-11-21 ENCOUNTER — LAB (OUTPATIENT)
Dept: LAB | Facility: HOSPITAL | Age: 32
End: 2017-11-21

## 2017-11-21 DIAGNOSIS — Z3A.28 28 WEEKS GESTATION OF PREGNANCY: ICD-10-CM

## 2017-11-21 DIAGNOSIS — Z34.83 PRENATAL CARE, SUBSEQUENT PREGNANCY, THIRD TRIMESTER: Primary | ICD-10-CM

## 2017-11-21 LAB
BLD GP AB SCN SERPL QL: NEGATIVE
DEPRECATED RDW RBC AUTO: 44.3 FL (ref 37–54)
ERYTHROCYTE [DISTWIDTH] IN BLOOD BY AUTOMATED COUNT: 13.6 % (ref 11.3–14.5)
GLUCOSE 1H P 100 G GLC PO SERPL-MCNC: 179 MG/DL (ref 65–199)
GLUCOSE BLDC GLUCOMTR-MCNC: 184 MG/DL
HCT VFR BLD AUTO: 34.2 % (ref 34.5–44)
HGB BLD-MCNC: 11.2 G/DL (ref 11.5–15.5)
MCH RBC QN AUTO: 29.1 PG (ref 27–31)
MCHC RBC AUTO-ENTMCNC: 32.7 G/DL (ref 32–36)
MCV RBC AUTO: 88.8 FL (ref 80–99)
PLATELET # BLD AUTO: 194 10*3/MM3 (ref 150–450)
PMV BLD AUTO: 11.4 FL (ref 6–12)
RBC # BLD AUTO: 3.85 10*6/MM3 (ref 3.89–5.14)
WBC NRBC COR # BLD: 10.66 10*3/MM3 (ref 3.5–10.8)

## 2017-11-21 PROCEDURE — 82962 GLUCOSE BLOOD TEST: CPT | Performed by: OBSTETRICS & GYNECOLOGY

## 2017-11-21 PROCEDURE — 86850 RBC ANTIBODY SCREEN: CPT | Performed by: OBSTETRICS & GYNECOLOGY

## 2017-11-21 PROCEDURE — 82950 GLUCOSE TEST: CPT

## 2017-11-21 PROCEDURE — 85027 COMPLETE CBC AUTOMATED: CPT | Performed by: OBSTETRICS & GYNECOLOGY

## 2017-11-21 PROCEDURE — 82962 GLUCOSE BLOOD TEST: CPT

## 2017-11-21 PROCEDURE — 36415 COLL VENOUS BLD VENIPUNCTURE: CPT

## 2017-11-29 ENCOUNTER — OFFICE VISIT (OUTPATIENT)
Dept: ENDOCRINOLOGY | Facility: CLINIC | Age: 32
End: 2017-11-29

## 2017-11-29 VITALS
DIASTOLIC BLOOD PRESSURE: 58 MMHG | HEART RATE: 75 BPM | WEIGHT: 145.8 LBS | HEIGHT: 64 IN | OXYGEN SATURATION: 100 % | BODY MASS INDEX: 24.89 KG/M2 | SYSTOLIC BLOOD PRESSURE: 100 MMHG

## 2017-11-29 DIAGNOSIS — E05.00 GRAVES DISEASE: ICD-10-CM

## 2017-11-29 DIAGNOSIS — E05.90 HYPERTHYROIDISM AFFECTING PREGNANCY IN THIRD TRIMESTER: Primary | ICD-10-CM

## 2017-11-29 DIAGNOSIS — O99.283 HYPERTHYROIDISM AFFECTING PREGNANCY IN THIRD TRIMESTER: Primary | ICD-10-CM

## 2017-11-29 LAB
T4 FREE SERPL-MCNC: 0.96 NG/DL (ref 0.89–1.76)
TSH SERPL DL<=0.05 MIU/L-ACNC: 0.36 MIU/ML (ref 0.35–5.35)

## 2017-11-29 PROCEDURE — 84439 ASSAY OF FREE THYROXINE: CPT | Performed by: INTERNAL MEDICINE

## 2017-11-29 PROCEDURE — 84443 ASSAY THYROID STIM HORMONE: CPT | Performed by: INTERNAL MEDICINE

## 2017-11-29 PROCEDURE — 99213 OFFICE O/P EST LOW 20 MIN: CPT | Performed by: INTERNAL MEDICINE

## 2017-11-29 RX ORDER — CALCIUM CITRATE, IRON PENTACARBONYL, CHOLECALCIFEROL, .ALPHA.-TOCOPHEROL, DL-, PYRIDOXINE HYDROCHLORIDE, FOLIC ACID, DOCUSATE SODIUM, AND DOCONEXENT 104; 27; 400; 30; 25; 1; 50; 260 MG/1; MG/1; [IU]/1; [IU]/1; MG/1; MG/1; MG/1; MG/1
CAPSULE, GELATIN COATED ORAL
Refills: 0 | COMMUNITY
Start: 2017-10-31 | End: 2018-12-17

## 2017-11-29 RX ORDER — METHIMAZOLE 5 MG/1
5 TABLET ORAL DAILY
Qty: 30 TABLET | Refills: 5 | Status: SHIPPED | OUTPATIENT
Start: 2017-11-29 | End: 2018-03-16

## 2017-11-29 NOTE — PROGRESS NOTES
Chief Complaint   Patient presents with   • Hyperthyroidism     Follow UP      HPI:   Estela Angela is a 32 y.o.female who [facility] for f/u evaluation of graves disease during pregnancy. Last visit 10/11/2017. Her history is as follows:    Interim Events:  - pt contacted me on 11/15/2017 for symptoms of fatigue. Checked TFT's which were now in the normal range. Asked pt to decrease her methimazole to 5 mg daily   - currently 30 weeks gestation, ALDO 2018, scheduled .   - overall feeling better since dose decrease. No complications with pregnancy to date.   - has 3 hr OGTT scheduled for this Friday, didn't pass 2 hr OGTT    1) Graves disease:   - first diagnosed in , by Dr. Merritt. Has been on thionamide therapy intermittently (mainly methimazole) since . Presented with palpitations, weight loss, and tremor.  - In 2016 had her first pregnancy (took PTU during that pregnancy). Postpartum was switched to methimazole.  - 18 months later, became pregnant with second child. Was on methimazole for a few weeks in the first trimester. Was switched to PTU. At 15 weeks, I switched to methimazole per current guidelines.     Currently on Methimazole 5 mg daily. no missed doses    Overall feels well  Review of Systems   Constitutional: Positive for fatigue.        Appropriate weight gain   HENT: Negative.    Eyes: Negative.    Respiratory: Negative.    Cardiovascular: Negative.    Gastrointestinal: Negative.  Negative for nausea and vomiting.   Endocrine: Negative.    Genitourinary: Negative.    Musculoskeletal: Negative.    Skin: Negative.    Allergic/Immunologic: Negative.    Neurological: Negative.    Hematological: Negative.    Psychiatric/Behavioral: Negative.      The following portions of the patient's history were reviewed and updated as appropriate: allergies, current medications, past family history, past medical history, past social history, past surgical history and  "problem list.    /58  Pulse 75  Ht 64\" (162.6 cm)  Wt 145 lb 12.8 oz (66.1 kg)  SpO2 100%  BMI 25.03 kg/m2  Physical Exam   Constitutional: She is oriented to person, place, and time. She appears well-developed. No distress.   HENT:   Head: Normocephalic.   Mouth/Throat: Oropharynx is clear and moist.   Eyes: Conjunctivae and EOM are normal. Pupils are equal, round, and reactive to light.   Neck: No tracheal deviation present. Thyromegaly (mild) present.   No palpable thyroid nodules     Cardiovascular: Normal rate, regular rhythm and normal heart sounds.    No murmur heard.  Pulmonary/Chest: Effort normal and breath sounds normal. No respiratory distress.   Abdominal:   Gravid uterus   Lymphadenopathy:     She has no cervical adenopathy.   Neurological: She is alert and oriented to person, place, and time. No cranial nerve deficit.   Skin: Skin is warm and dry. She is not diaphoretic. No erythema.   Psychiatric: She has a normal mood and affect. Her behavior is normal.   Vitals reviewed.    LABS/IMAGING: outside records reviewed and summarized in HPI  Results for orders placed or performed in visit on 11/29/17   TSH   Result Value Ref Range    TSH 0.363 0.350 - 5.350 mIU/mL   T4, Free   Result Value Ref Range    Free T4 0.96 0.89 - 1.76 ng/dL     ASSESSMENT/PLAN:  1) hyperthyroidism affecting pregnancy in third trimester, 2) Graves disease:  - pt clinically euthyroid on exam  - overall goal will be to keep her TSH in the low end of the normal range (< 2.00) during her pregnancy.   - TFT's checked today were WNL  - continue methimazole 5 mg daily.   -  Recent (09/2017) CBC and CMP WNL  - will have pt complete TFT's again 01/15/2018    RTC 6 - 8 weeks post partum                "

## 2017-12-01 ENCOUNTER — TRANSCRIBE ORDERS (OUTPATIENT)
Dept: LAB | Facility: HOSPITAL | Age: 32
End: 2017-12-01

## 2017-12-01 ENCOUNTER — LAB (OUTPATIENT)
Dept: LAB | Facility: HOSPITAL | Age: 32
End: 2017-12-01

## 2017-12-01 ENCOUNTER — TELEPHONE (OUTPATIENT)
Dept: ENDOCRINOLOGY | Facility: CLINIC | Age: 32
End: 2017-12-01

## 2017-12-01 DIAGNOSIS — Z34.83 PRENATAL CARE, SUBSEQUENT PREGNANCY, THIRD TRIMESTER: Primary | ICD-10-CM

## 2017-12-01 DIAGNOSIS — Z34.83 PRENATAL CARE, SUBSEQUENT PREGNANCY, THIRD TRIMESTER: ICD-10-CM

## 2017-12-01 LAB
GLUCOSE 1H P 100 G GLC PO SERPL-MCNC: 113 MG/DL (ref 65–199)
GLUCOSE 2H P 100 G GLC PO SERPL-MCNC: 103 MG/DL (ref 65–139)
GLUCOSE 3H P 100 G GLC PO SERPL-MCNC: 110 MG/DL (ref 65–109)
GLUCOSE BLDC GLUCOMTR-MCNC: 115 MG/DL
GLUCOSE BLDC-MCNC: 83 MG/DL (ref 75–125)
GLUCOSE P FAST SERPL-MCNC: 86 MG/DL (ref 65–99)

## 2017-12-01 PROCEDURE — 82952 GTT-ADDED SAMPLES: CPT

## 2017-12-01 PROCEDURE — 82962 GLUCOSE BLOOD TEST: CPT

## 2017-12-01 PROCEDURE — 82951 GLUCOSE TOLERANCE TEST (GTT): CPT

## 2017-12-01 PROCEDURE — 82962 GLUCOSE BLOOD TEST: CPT | Performed by: OBSTETRICS & GYNECOLOGY

## 2017-12-01 PROCEDURE — 36415 COLL VENOUS BLD VENIPUNCTURE: CPT

## 2017-12-21 ENCOUNTER — TRANSCRIBE ORDERS (OUTPATIENT)
Dept: LAB | Facility: HOSPITAL | Age: 32
End: 2017-12-21

## 2017-12-21 ENCOUNTER — LAB (OUTPATIENT)
Dept: LAB | Facility: HOSPITAL | Age: 32
End: 2017-12-21

## 2017-12-21 DIAGNOSIS — R50.9 HYPERTHERMIA-INDUCED DEFECT: ICD-10-CM

## 2017-12-21 DIAGNOSIS — R50.9 HYPERTHERMIA-INDUCED DEFECT: Primary | ICD-10-CM

## 2017-12-21 DIAGNOSIS — R52 ACHES: ICD-10-CM

## 2017-12-21 LAB
FLUAV AG NPH QL: POSITIVE
FLUBV AG NPH QL IA: NEGATIVE

## 2017-12-21 PROCEDURE — 87804 INFLUENZA ASSAY W/OPTIC: CPT

## 2018-01-09 LAB — EXTERNAL GROUP B STREP ANTIGEN: NEGATIVE

## 2018-01-19 ENCOUNTER — LAB (OUTPATIENT)
Dept: LAB | Facility: HOSPITAL | Age: 33
End: 2018-01-19

## 2018-01-19 DIAGNOSIS — E05.00 GRAVES DISEASE: ICD-10-CM

## 2018-01-19 DIAGNOSIS — E05.90 HYPERTHYROIDISM AFFECTING PREGNANCY IN THIRD TRIMESTER: ICD-10-CM

## 2018-01-19 DIAGNOSIS — O99.283 HYPERTHYROIDISM AFFECTING PREGNANCY IN THIRD TRIMESTER: ICD-10-CM

## 2018-01-19 LAB
T4 FREE SERPL-MCNC: 1.01 NG/DL (ref 0.89–1.76)
TSH SERPL DL<=0.05 MIU/L-ACNC: 0.9 MIU/ML (ref 0.35–5.35)

## 2018-01-19 PROCEDURE — 84443 ASSAY THYROID STIM HORMONE: CPT

## 2018-01-19 PROCEDURE — 84439 ASSAY OF FREE THYROXINE: CPT

## 2018-01-22 ENCOUNTER — TELEPHONE (OUTPATIENT)
Dept: ENDOCRINOLOGY | Facility: CLINIC | Age: 33
End: 2018-01-22

## 2018-01-23 NOTE — TELEPHONE ENCOUNTER
Spoke to patient about lab results. Pt to continue methimazole 5 mg daily and to stay on medication postpartum.  Vidya Sousa MD

## 2018-02-05 PROBLEM — Z34.90 TERM PREGNANCY: Status: ACTIVE | Noted: 2018-02-05

## 2018-02-05 RX ORDER — TRISODIUM CITRATE DIHYDRATE AND CITRIC ACID MONOHYDRATE 500; 334 MG/5ML; MG/5ML
30 SOLUTION ORAL ONCE
Status: DISCONTINUED | OUTPATIENT
Start: 2018-02-05 | End: 2018-02-07 | Stop reason: HOSPADM

## 2018-02-05 RX ORDER — CEFAZOLIN SODIUM 2 G/100ML
2 INJECTION, SOLUTION INTRAVENOUS ONCE
Status: DISCONTINUED | OUTPATIENT
Start: 2018-02-05 | End: 2018-02-07 | Stop reason: HOSPADM

## 2018-02-05 RX ORDER — SODIUM CHLORIDE 0.9 % (FLUSH) 0.9 %
1-10 SYRINGE (ML) INJECTION AS NEEDED
Status: DISCONTINUED | OUTPATIENT
Start: 2018-02-05 | End: 2018-02-07 | Stop reason: HOSPADM

## 2018-02-05 RX ORDER — SODIUM CHLORIDE, SODIUM LACTATE, POTASSIUM CHLORIDE, CALCIUM CHLORIDE 600; 310; 30; 20 MG/100ML; MG/100ML; MG/100ML; MG/100ML
125 INJECTION, SOLUTION INTRAVENOUS CONTINUOUS
Status: DISCONTINUED | OUTPATIENT
Start: 2018-02-05 | End: 2018-02-07

## 2018-02-05 RX ORDER — LIDOCAINE HYDROCHLORIDE 10 MG/ML
5 INJECTION, SOLUTION INFILTRATION; PERINEURAL AS NEEDED
Status: DISCONTINUED | OUTPATIENT
Start: 2018-02-05 | End: 2018-02-07 | Stop reason: HOSPADM

## 2018-02-06 ENCOUNTER — APPOINTMENT (OUTPATIENT)
Dept: PREADMISSION TESTING | Facility: HOSPITAL | Age: 33
End: 2018-02-06

## 2018-02-06 VITALS — BODY MASS INDEX: 26.72 KG/M2 | WEIGHT: 156.53 LBS | HEIGHT: 64 IN

## 2018-02-06 LAB
ABO GROUP BLD: NORMAL
BLD GP AB SCN SERPL QL: NEGATIVE
DEPRECATED RDW RBC AUTO: 48.6 FL (ref 37–54)
ERYTHROCYTE [DISTWIDTH] IN BLOOD BY AUTOMATED COUNT: 15.4 % (ref 11.3–14.5)
HCT VFR BLD AUTO: 36.6 % (ref 34.5–44)
HGB BLD-MCNC: 11.4 G/DL (ref 11.5–15.5)
MCH RBC QN AUTO: 26.9 PG (ref 27–31)
MCHC RBC AUTO-ENTMCNC: 31.1 G/DL (ref 32–36)
MCV RBC AUTO: 86.3 FL (ref 80–99)
PLATELET # BLD AUTO: 201 10*3/MM3 (ref 150–450)
PMV BLD AUTO: 12.5 FL (ref 6–12)
RBC # BLD AUTO: 4.24 10*6/MM3 (ref 3.89–5.14)
RH BLD: POSITIVE
WBC NRBC COR # BLD: 9.23 10*3/MM3 (ref 3.5–10.8)

## 2018-02-06 PROCEDURE — 86850 RBC ANTIBODY SCREEN: CPT | Performed by: OBSTETRICS & GYNECOLOGY

## 2018-02-06 PROCEDURE — 86900 BLOOD TYPING SEROLOGIC ABO: CPT | Performed by: OBSTETRICS & GYNECOLOGY

## 2018-02-06 PROCEDURE — 36415 COLL VENOUS BLD VENIPUNCTURE: CPT

## 2018-02-06 PROCEDURE — 86901 BLOOD TYPING SEROLOGIC RH(D): CPT | Performed by: OBSTETRICS & GYNECOLOGY

## 2018-02-06 PROCEDURE — 85027 COMPLETE CBC AUTOMATED: CPT | Performed by: OBSTETRICS & GYNECOLOGY

## 2018-02-06 NOTE — DISCHARGE INSTRUCTIONS
Dear Patient,    We are happy that you have chosen [facility] to have your baby.  We hope that by providing you with the following information, your childbirth experience will be a positive one.    What to know before your arrive:     -Do not eat, drink or chew gum after midnight the day before your procedure.    This also includes mints.   -Do not shave any part of your body including abdomen or pelvic are for two    days before your procedure.   -If you are taking a scheduled medication (insulin, blood pressure medicine,   antibiotics) please consult with your physician whether to take on the day of   surgery.   -Remove all jewelry including rings, wedding bands, and piercing before coming   to the hospital.   -Leave important valuables at home.   -Do not wear dark fingernail polish.   -Bring the following with you to the hospital:    -Picture ID and insurance, Medicare or Medicaid cards    -Co-pay/deductible required by insurance (Cash, Check, Credit Card)    -Copy of living will or power  document (if applicable)    -CPAP mask and tubing, not machine (if applicable)    -Skin prep instructions sheet    What to know the day of procedure:     -[facility], take elevator for first floor, exit to the right and  proceed through the doors to outside, follow the covered sidewalk to the  entrance of the [facility], follow the hallway and signs to the [facility],  enter the [facility] to your right BEFORE entering the 17[address] lobby.  Take the  elevators to the 3rd floor ([address]).   -Leave unnecessary items in your vehicle, including your suitcase.  Your support  person or a family member can get it for you after your procedure.   -Check in at the reception desk in the lobby of the 3rd floor ([address]).   -One person may accompany you to the pre-op/recovery area.  Please have  other family members wait in the waiting room.   -An anesthesiologist will meet with your prior to  your procedure.   -After anesthesia has been initiated, one person may accompany you in the  operating room.   -No video cameras are permitted in the operating room; only still cameras,  Please.      What to expect while you are in recovery:     -One person may stay with you while you are in recovery.   -If the baby is stable, he/she may visit to initiate breastfeeding & [facility].

## 2018-02-07 ENCOUNTER — ANESTHESIA EVENT (OUTPATIENT)
Dept: LABOR AND DELIVERY | Facility: HOSPITAL | Age: 33
End: 2018-02-07

## 2018-02-07 ENCOUNTER — HOSPITAL ENCOUNTER (INPATIENT)
Facility: HOSPITAL | Age: 33
LOS: 2 days | Discharge: HOME | End: 2018-02-09
Attending: OBSTETRICS & GYNECOLOGY | Admitting: OBSTETRICS & GYNECOLOGY

## 2018-02-07 ENCOUNTER — ANESTHESIA (OUTPATIENT)
Dept: LABOR AND DELIVERY | Facility: HOSPITAL | Age: 33
End: 2018-02-07

## 2018-02-07 LAB
ATMOSPHERIC PRESS: ABNORMAL MMHG
ATMOSPHERIC PRESS: ABNORMAL MMHG
BASE EXCESS BLDCOA CALC-SCNC: -11.2 MMOL/L (ref 0–2)
BASE EXCESS BLDCOV CALC-SCNC: -6.6 MMOL/L (ref 0–2)
BDY SITE: ABNORMAL
CO2 BLDA-SCNC: 23.3 MMOL/L (ref 22–33)
CO2 BLDA-SCNC: 23.9 MMOL/L (ref 22–33)
HCO3 BLDCOA-SCNC: 21.5 MMOL/L (ref 16.9–20.5)
HCO3 BLDCOV-SCNC: 21.7 MMOL/L (ref 18.6–21.4)
HGB BLDA-MCNC: 17.2 G/DL (ref 14–18)
HGB BLDA-MCNC: 17.6 G/DL (ref 14–18)
HOROWITZ INDEX BLD+IHG-RTO: 21 %
HOROWITZ INDEX BLD+IHG-RTO: 21 %
MODALITY: ABNORMAL
MODALITY: ABNORMAL
PCO2 BLDCOA: 78.9 MMHG (ref 43.3–54.9)
PCO2 BLDCOV: 52 MM HG (ref 30–60)
PH BLDCOA: 7.04 PH UNITS (ref 7.2–7.3)
PH BLDCOV: 7.23 PH UNITS (ref 7.19–7.46)
PO2 BLDCOA: 12.4 MMHG (ref 11.5–43.3)
PO2 BLDCOV: 20.4 MM HG
SAO2 % BLDCOA: 11.2 %
SAO2 % BLDCOA: ABNORMAL % (ref 45–75)
SAO2 % BLDCOV: 36.9 %

## 2018-02-07 PROCEDURE — 25010000003 CEFAZOLIN IN DEXTROSE 2-4 GM/100ML-% SOLUTION: Performed by: OBSTETRICS & GYNECOLOGY

## 2018-02-07 PROCEDURE — 25010000002 PHENYLEPHRINE PER 1 ML: Performed by: NURSE ANESTHETIST, CERTIFIED REGISTERED

## 2018-02-07 PROCEDURE — 82805 BLOOD GASES W/O2 SATURATION: CPT | Performed by: OBSTETRICS & GYNECOLOGY

## 2018-02-07 PROCEDURE — 59025 FETAL NON-STRESS TEST: CPT

## 2018-02-07 PROCEDURE — 25010000002 MIDAZOLAM PER 1 MG: Performed by: NURSE ANESTHETIST, CERTIFIED REGISTERED

## 2018-02-07 PROCEDURE — 25010000002 ONDANSETRON PER 1 MG: Performed by: NURSE ANESTHETIST, CERTIFIED REGISTERED

## 2018-02-07 PROCEDURE — 25010000003 MORPHINE PER 10 MG: Performed by: NURSE ANESTHETIST, CERTIFIED REGISTERED

## 2018-02-07 PROCEDURE — 25010000002 FENTANYL CITRATE (PF) 100 MCG/2ML SOLUTION: Performed by: NURSE ANESTHETIST, CERTIFIED REGISTERED

## 2018-02-07 PROCEDURE — 25010000002 ONDANSETRON PER 1 MG: Performed by: OBSTETRICS & GYNECOLOGY

## 2018-02-07 RX ORDER — CARBOPROST TROMETHAMINE 250 UG/ML
250 INJECTION, SOLUTION INTRAMUSCULAR AS NEEDED
Status: DISCONTINUED | OUTPATIENT
Start: 2018-02-07 | End: 2018-02-09 | Stop reason: HOSPADM

## 2018-02-07 RX ORDER — METHIMAZOLE 10 MG/1
5 TABLET ORAL DAILY
Status: DISCONTINUED | OUTPATIENT
Start: 2018-02-07 | End: 2018-02-09 | Stop reason: HOSPADM

## 2018-02-07 RX ORDER — MISOPROSTOL 200 UG/1
800 TABLET ORAL AS NEEDED
Status: DISCONTINUED | OUTPATIENT
Start: 2018-02-07 | End: 2018-02-09 | Stop reason: HOSPADM

## 2018-02-07 RX ORDER — DOCUSATE SODIUM 100 MG/1
100 CAPSULE, LIQUID FILLED ORAL 2 TIMES DAILY
Status: DISCONTINUED | OUTPATIENT
Start: 2018-02-07 | End: 2018-02-09 | Stop reason: HOSPADM

## 2018-02-07 RX ORDER — OXYTOCIN 10 [USP'U]/ML
INJECTION, SOLUTION INTRAMUSCULAR; INTRAVENOUS AS NEEDED
Status: DISCONTINUED | OUTPATIENT
Start: 2018-02-07 | End: 2018-02-07 | Stop reason: SURG

## 2018-02-07 RX ORDER — SODIUM CHLORIDE, SODIUM LACTATE, POTASSIUM CHLORIDE, CALCIUM CHLORIDE 600; 310; 30; 20 MG/100ML; MG/100ML; MG/100ML; MG/100ML
125 INJECTION, SOLUTION INTRAVENOUS ONCE
Status: DISCONTINUED | OUTPATIENT
Start: 2018-02-07 | End: 2018-02-07

## 2018-02-07 RX ORDER — CALCIUM CARBONATE 200(500)MG
2 TABLET,CHEWABLE ORAL 3 TIMES DAILY PRN
Status: DISCONTINUED | OUTPATIENT
Start: 2018-02-07 | End: 2018-02-09 | Stop reason: HOSPADM

## 2018-02-07 RX ORDER — IBUPROFEN 600 MG/1
600 TABLET ORAL EVERY 6 HOURS PRN
Status: COMPLETED | OUTPATIENT
Start: 2018-02-07 | End: 2018-02-07

## 2018-02-07 RX ORDER — SIMETHICONE 80 MG
80 TABLET,CHEWABLE ORAL 4 TIMES DAILY PRN
Status: DISCONTINUED | OUTPATIENT
Start: 2018-02-07 | End: 2018-02-09 | Stop reason: HOSPADM

## 2018-02-07 RX ORDER — LANOLIN 100 %
OINTMENT (GRAM) TOPICAL
Status: DISCONTINUED | OUTPATIENT
Start: 2018-02-07 | End: 2018-02-09 | Stop reason: HOSPADM

## 2018-02-07 RX ORDER — HYDROCODONE BITARTRATE AND ACETAMINOPHEN 5; 325 MG/1; MG/1
1 TABLET ORAL EVERY 4 HOURS PRN
Status: COMPLETED | OUTPATIENT
Start: 2018-02-07 | End: 2018-02-07

## 2018-02-07 RX ORDER — DIPHENHYDRAMINE HCL 25 MG
25 CAPSULE ORAL EVERY 4 HOURS PRN
Status: DISCONTINUED | OUTPATIENT
Start: 2018-02-07 | End: 2018-02-09 | Stop reason: HOSPADM

## 2018-02-07 RX ORDER — METHYLERGONOVINE MALEATE 0.2 MG/ML
200 INJECTION INTRAVENOUS ONCE AS NEEDED
Status: DISCONTINUED | OUTPATIENT
Start: 2018-02-07 | End: 2018-02-09 | Stop reason: HOSPADM

## 2018-02-07 RX ORDER — TRISODIUM CITRATE DIHYDRATE AND CITRIC ACID MONOHYDRATE 500; 334 MG/5ML; MG/5ML
30 SOLUTION ORAL ONCE
Status: COMPLETED | OUTPATIENT
Start: 2018-02-07 | End: 2018-02-07

## 2018-02-07 RX ORDER — ALUMINA, MAGNESIA, AND SIMETHICONE 2400; 2400; 240 MG/30ML; MG/30ML; MG/30ML
15 SUSPENSION ORAL EVERY 4 HOURS PRN
Status: DISCONTINUED | OUTPATIENT
Start: 2018-02-07 | End: 2018-02-09 | Stop reason: HOSPADM

## 2018-02-07 RX ORDER — CEFAZOLIN SODIUM 2 G/100ML
2 INJECTION, SOLUTION INTRAVENOUS ONCE
Status: COMPLETED | OUTPATIENT
Start: 2018-02-07 | End: 2018-02-07

## 2018-02-07 RX ORDER — OXYTOCIN/RINGER'S LACTATE 20/1000 ML
999 PLASTIC BAG, INJECTION (ML) INTRAVENOUS ONCE
Status: DISCONTINUED | OUTPATIENT
Start: 2018-02-07 | End: 2018-02-07 | Stop reason: HOSPADM

## 2018-02-07 RX ORDER — SIMETHICONE 80 MG
80 TABLET,CHEWABLE ORAL 4 TIMES DAILY
Status: DISCONTINUED | OUTPATIENT
Start: 2018-02-07 | End: 2018-02-09 | Stop reason: HOSPADM

## 2018-02-07 RX ORDER — MORPHINE SULFATE 0.5 MG/ML
INJECTION, SOLUTION EPIDURAL; INTRATHECAL; INTRAVENOUS AS NEEDED
Status: DISCONTINUED | OUTPATIENT
Start: 2018-02-07 | End: 2018-02-07 | Stop reason: SURG

## 2018-02-07 RX ORDER — PRENATAL VIT/IRON FUM/FOLIC AC 27MG-0.8MG
1 TABLET ORAL DAILY
Status: DISCONTINUED | OUTPATIENT
Start: 2018-02-07 | End: 2018-02-09 | Stop reason: HOSPADM

## 2018-02-07 RX ORDER — DIPHENHYDRAMINE HYDROCHLORIDE 50 MG/ML
25 INJECTION INTRAMUSCULAR; INTRAVENOUS EVERY 4 HOURS PRN
Status: DISCONTINUED | OUTPATIENT
Start: 2018-02-07 | End: 2018-02-09 | Stop reason: HOSPADM

## 2018-02-07 RX ORDER — IBUPROFEN 600 MG/1
600 TABLET ORAL EVERY 6 HOURS PRN
Status: DISCONTINUED | OUTPATIENT
Start: 2018-02-07 | End: 2018-02-09 | Stop reason: HOSPADM

## 2018-02-07 RX ORDER — ONDANSETRON 2 MG/ML
4 INJECTION INTRAMUSCULAR; INTRAVENOUS ONCE AS NEEDED
Status: DISCONTINUED | OUTPATIENT
Start: 2018-02-07 | End: 2018-02-07 | Stop reason: HOSPADM

## 2018-02-07 RX ORDER — MIDAZOLAM HYDROCHLORIDE 1 MG/ML
INJECTION INTRAMUSCULAR; INTRAVENOUS AS NEEDED
Status: DISCONTINUED | OUTPATIENT
Start: 2018-02-07 | End: 2018-02-07 | Stop reason: SURG

## 2018-02-07 RX ORDER — ONDANSETRON 4 MG/1
4 TABLET, FILM COATED ORAL EVERY 6 HOURS PRN
Status: DISCONTINUED | OUTPATIENT
Start: 2018-02-07 | End: 2018-02-09 | Stop reason: HOSPADM

## 2018-02-07 RX ORDER — BUPIVACAINE HYDROCHLORIDE 7.5 MG/ML
INJECTION, SOLUTION EPIDURAL; RETROBULBAR AS NEEDED
Status: DISCONTINUED | OUTPATIENT
Start: 2018-02-07 | End: 2018-02-07 | Stop reason: SURG

## 2018-02-07 RX ORDER — ONDANSETRON 2 MG/ML
INJECTION INTRAMUSCULAR; INTRAVENOUS AS NEEDED
Status: DISCONTINUED | OUTPATIENT
Start: 2018-02-07 | End: 2018-02-07 | Stop reason: SURG

## 2018-02-07 RX ORDER — OXYTOCIN/RINGER'S LACTATE 20/1000 ML
125 PLASTIC BAG, INJECTION (ML) INTRAVENOUS CONTINUOUS PRN
Status: DISCONTINUED | OUTPATIENT
Start: 2018-02-07 | End: 2018-02-07 | Stop reason: HOSPADM

## 2018-02-07 RX ORDER — NALOXONE HCL 0.4 MG/ML
0.4 VIAL (ML) INJECTION
Status: ACTIVE | OUTPATIENT
Start: 2018-02-07 | End: 2018-02-08

## 2018-02-07 RX ORDER — OXYCODONE HYDROCHLORIDE AND ACETAMINOPHEN 5; 325 MG/1; MG/1
2 TABLET ORAL EVERY 4 HOURS PRN
Status: DISCONTINUED | OUTPATIENT
Start: 2018-02-07 | End: 2018-02-09 | Stop reason: HOSPADM

## 2018-02-07 RX ORDER — ONDANSETRON 2 MG/ML
4 INJECTION INTRAMUSCULAR; INTRAVENOUS EVERY 6 HOURS PRN
Status: DISCONTINUED | OUTPATIENT
Start: 2018-02-07 | End: 2018-02-09 | Stop reason: HOSPADM

## 2018-02-07 RX ORDER — FENTANYL CITRATE 50 UG/ML
INJECTION, SOLUTION INTRAMUSCULAR; INTRAVENOUS AS NEEDED
Status: DISCONTINUED | OUTPATIENT
Start: 2018-02-07 | End: 2018-02-07 | Stop reason: SURG

## 2018-02-07 RX ADMIN — SODIUM CHLORIDE, POTASSIUM CHLORIDE, SODIUM LACTATE AND CALCIUM CHLORIDE 125 ML/HR: 600; 310; 30; 20 INJECTION, SOLUTION INTRAVENOUS at 10:28

## 2018-02-07 RX ADMIN — PHENYLEPHRINE HYDROCHLORIDE 100 MCG: 10 INJECTION INTRAVENOUS at 11:25

## 2018-02-07 RX ADMIN — HYDROCODONE BITARTRATE AND ACETAMINOPHEN 1 TABLET: 5; 325 TABLET ORAL at 13:55

## 2018-02-07 RX ADMIN — PHENYLEPHRINE HYDROCHLORIDE 100 MCG: 10 INJECTION INTRAVENOUS at 11:15

## 2018-02-07 RX ADMIN — MORPHINE SULFATE 0.15 MG: 0.5 INJECTION, SOLUTION EPIDURAL; INTRATHECAL; INTRAVENOUS at 11:09

## 2018-02-07 RX ADMIN — CEFAZOLIN SODIUM 2 G: 2 INJECTION, SOLUTION INTRAVENOUS at 10:50

## 2018-02-07 RX ADMIN — BUPIVACAINE HYDROCHLORIDE 1.4 ML: 7.5 INJECTION, SOLUTION EPIDURAL; RETROBULBAR at 11:09

## 2018-02-07 RX ADMIN — SODIUM CHLORIDE, POTASSIUM CHLORIDE, SODIUM LACTATE AND CALCIUM CHLORIDE: 600; 310; 30; 20 INJECTION, SOLUTION INTRAVENOUS at 11:07

## 2018-02-07 RX ADMIN — SODIUM CITRATE AND CITRIC ACID MONOHYDRATE 30 ML: 500; 334 SOLUTION ORAL at 10:56

## 2018-02-07 RX ADMIN — Medication 4 APPLICATION: at 18:14

## 2018-02-07 RX ADMIN — IBUPROFEN 600 MG: 600 TABLET, FILM COATED ORAL at 13:55

## 2018-02-07 RX ADMIN — EPHEDRINE SULFATE 10 MG: 50 INJECTION INTRAMUSCULAR; INTRAVENOUS; SUBCUTANEOUS at 11:11

## 2018-02-07 RX ADMIN — OXYTOCIN 30 UNITS: 10 INJECTION, SOLUTION INTRAMUSCULAR; INTRAVENOUS at 11:36

## 2018-02-07 RX ADMIN — FENTANYL CITRATE 85 MCG: 50 INJECTION, SOLUTION INTRAMUSCULAR; INTRAVENOUS at 11:42

## 2018-02-07 RX ADMIN — ONDANSETRON 4 MG: 2 INJECTION INTRAMUSCULAR; INTRAVENOUS at 11:01

## 2018-02-07 RX ADMIN — OXYCODONE AND ACETAMINOPHEN 2 TABLET: 5; 325 TABLET ORAL at 18:14

## 2018-02-07 RX ADMIN — EPHEDRINE SULFATE 10 MG: 50 INJECTION INTRAMUSCULAR; INTRAVENOUS; SUBCUTANEOUS at 11:47

## 2018-02-07 RX ADMIN — MIDAZOLAM HYDROCHLORIDE 1 MG: 1 INJECTION, SOLUTION INTRAMUSCULAR; INTRAVENOUS at 11:01

## 2018-02-07 RX ADMIN — FENTANYL CITRATE 15 MCG: 50 INJECTION, SOLUTION INTRAMUSCULAR; INTRAVENOUS at 11:09

## 2018-02-07 RX ADMIN — SODIUM CHLORIDE, POTASSIUM CHLORIDE, SODIUM LACTATE AND CALCIUM CHLORIDE: 600; 310; 30; 20 INJECTION, SOLUTION INTRAVENOUS at 11:36

## 2018-02-07 RX ADMIN — ONDANSETRON HYDROCHLORIDE 4 MG: 2 INJECTION, SOLUTION INTRAMUSCULAR; INTRAVENOUS at 20:54

## 2018-02-07 RX ADMIN — Medication 125 ML/HR: at 14:00

## 2018-02-07 NOTE — ANESTHESIA POSTPROCEDURE EVALUATION
Patient: Estela Angela    Procedure Summary     Date Anesthesia Start Anesthesia Stop Room / Location    18 1100 1218 [facility] LAB[facility] DELIV[facility]   [facility]       Procedure Diagnosis Surgeon Provider     SECTION REPEAT *39WKS* (N/A Abdomen) No diagnosis on file. MD Gregory Paige MD          Anesthesia Type: spinal, ITN  Last vitals  BP   110/61   Temp 97.6   Pulse 70   Resp 16   SpO2 99%     Post Anesthesia Care and Evaluation    Patient location during evaluation: bedside  Patient participation: complete - patient participated  Level of consciousness: awake and alert  Pain management: adequate  Airway patency: patent  Anesthetic complications: No anesthetic complications    Cardiovascular status: acceptable  Respiratory status: acceptable  Hydration status: acceptable

## 2018-02-07 NOTE — OP NOTE
Section Procedure Note    Etsela Angela    2017     Indications: 1. IUP at 40w1d   2. Grave's Disease  3. H/O previous C/S, failure of spontaneous labor to occur    Pre-operative Diagnosis: 40w1d    Post-operative Diagnosis: same    Findings: 1. VMI, Apgars 6,8, weight 8lbs 4oz. 2. Posterior placenta, intact. 3. Normal appearing uterus, bilateral tubes and ovaries.    Birth Information  YOB: 2017   Time of birth: 11:34 AM   Delivering clinician: Padmini Baxter   Sex: male   Delivery type: , Low Transverse   Breech type (if applicable):     Observed anomalies/comments:         Estimated Blood Loss:  800           Drains: Olea                 Specimens: None               Complications:  None; patient tolerated the procedure well.           Disposition: PACU - hemodynamically stable.           Condition: stable    Surgeon: Padmini Baxter MD    Assistants: Casimiro Stanley MD    Anesthesia: Spinal anesthesia    ASA Class: 1    Procedure Details   The patient was seen in the Holding Room. The risks, benefits, complications, treatment options, and expected outcomes were discussed with the patient.  The patient concurred with the proposed plan, giving informed consent.  The site of surgery was properly noted. The patient was taken to the Operating Room, identified as Estela Angela and the procedure verified as  Delivery. A Time Out was held and the above information confirmed.    After induction of anesthesia, the patient was draped and prepped in the usual sterile manner. A Pfannenstiel incision was made and carried down through the subcutaneous tissue to the fascia. A fascial incision was made and extended transversely. The fascia was  from the underlying rectus tissue superiorly and inferiorly. The peritoneum was identified and entered. The peritoneal incision was extended longitudinally.  The serosa was incised with Metzenbaum scissors and extended  laterally; bladder flap was created. Bladder blade was placed. A low transverse uterine incision was made. Delivered from vertex presentation was a Lowber Measurements  Weight (oz): 132.31    Length (in): 20.5    Head circumference (in):      Chest circumference (in):      with apgars scores of         APGARS  One minute Five minutes Ten minutes Fifteen minutes Twenty minutes   Skin color: 0   1             Heart rate: 2   2             Grimace: 1   2              Muscle tone: 1   1              Breathin   2              Totals: 6   8              . After the umbilical cord was clamped and cut, cord blood was obtained for evaluation. The placenta was removed intact and appeared normal. The uterine outline, tubes and ovaries appeared normal. The uterine incision was closed in a single layer with running locked sutures of 1 Vicryl. Hemostasis was observed. The fascia was then reapproximated with running sutures of 1 PDS stratafix. The subcutaneous tissue was reapproximated with 3-0 plain. The previous keloid scar was then excised with scalpel and electocautery. The skin was reapproximated with 4-0 monocryl and Skin glue.    Instrument, sponge, and needle counts were correct prior the abdominal closure and at the conclusion of the case.         Casimiro Stanley MD  12:30 PM  2018

## 2018-02-07 NOTE — LACTATION NOTE
"This note was copied from a baby's chart.     02/07/18 1505   Maternal Infant Assessment   Size Issue, Bilateral Breasts no   Shape, Bilateral Breasts round   Density, Bilateral Breasts soft   Nipples, Bilateral everted   Nipple Conditions, Bilateral tender;intact   Infant Assessment   Sucking Reflex present   Rooting Reflex present   Swallow Reflex present   LATCH Score   Latch 2-->grasps breast, tongue down, lips flanged, rhythmic sucking   Audible Swallowing 1-->a few with stimulation   Type Of Nipple 2-->everted (after stimulation)   Comfort (Breast/Nipple) 1-->filling, red/small blisters/bruises, mild/mod discomfort   Hold (Positioning) 1-->minimal assist, teach one side: mother does other, staff holds   Score (less than 7 for 2/more consecutive times, consult Lactation Consultant) 7   Maternal Infant Feeding   Previous Breastfeeding History yes   Infant Positioning clutch/\"football\"   Nipple Shape After Feeding, Left Breast pinched   Nipple Shape After Feeding, Right Breast pinched   Feeding Infant   Effective Latch During Feeding other (see comments)  (latch is shallow)   Equipment Type/Education   Breast Pump Type double electric, personal;other (see comments)  (gave script to obtain a new home pump)   Additional Equipment breast shields;other (see comments)  (patient requested a nipple shield)     "

## 2018-02-07 NOTE — PLAN OF CARE
Problem: Patient Care Overview (Adult)  Goal: Plan of Care Review  Outcome: Ongoing (interventions implemented as appropriate)   02/07/18 7777   Coping/Psychosocial Response Interventions   Plan Of Care Reviewed With patient   Patient Care Overview   Progress no change   Outcome Evaluation   Outcome Summary/Follow up Plan Infant latches shallowly and pinches patient's nipples. Helped her position infant better, but no improvement in latch seen. She requested a nipple shield and indicated it helped with painful latch. Her milk hand expresses easily and her nipples are everted. Infant's oral anatomy appears WNL. Patient will continue to use nipple shield, as needed, and continue to work for deeper latch.

## 2018-02-07 NOTE — ANESTHESIA PROCEDURE NOTES
Spinal Block    Indication:procedure for pain  Performed By  Anesthesiologist: DOUG MARSHALL  CRNA: MERCEDES STEPHENSON  Preanesthetic Checklist  Completed: patient identified, surgical consent, pre-op evaluation, timeout performed, IV checked, risks and benefits discussed and monitors and equipment checked  Spinal Block Prep:  Patient Position:sitting  Sterile Tech:cap, gloves, mask and sterile barriers  Prep:Betadine  Patient Monitoring:blood pressure monitoring, continuous pulse oximetry and EKG  Spinal Block Procedure  Approach:midline  Guidance:palpation technique  Location:L3-L4  Needle Type:Mcihel  Needle Gauge:25 G  Placement of Spinal needle event:cerebrospinal fluid aspirated  Paresthesia: no  Fluid Appearance:clear  Post Assessment  Patient Tolerance:patient tolerated the procedure well with no apparent complications  Complications no

## 2018-02-07 NOTE — PLAN OF CARE
Problem: Patient Care Overview (Adult)  Goal: Plan of Care Review  Outcome: Ongoing (interventions implemented as appropriate)   18 1308   Coping/Psychosocial Response Interventions   Plan Of Care Reviewed With patient     Goal: Adult Individualization and Mutuality  Outcome: Ongoing (interventions implemented as appropriate)   18 1308   Individualization   Patient Specific Preferences Pt. desires to breastfeed     Goal: Discharge Needs Assessment  Outcome: Ongoing (interventions implemented as appropriate)   18 1308   Discharge Needs Assessment   Concerns To Be Addressed denies needs/concerns at this time       Problem:  Delivery (Adult,Obstetrics,Pediatric)  Goal: Signs and Symptoms of Listed Potential Problems Will be Absent or Manageable ( Delivery)  Outcome: Outcome(s) achieved Date Met: 18 1308    Delivery   Problems Assessed ( Delivery) all   Problems Present ( Delivery) none

## 2018-02-07 NOTE — ANESTHESIA PREPROCEDURE EVALUATION
Anesthesia Evaluation     Patient summary reviewed and Nursing notes reviewed   no history of anesthetic complications:  NPO Solid Status: > 8 hours  NPO Liquid Status: > 8 hours           Airway   Mallampati: II  TM distance: >3 FB  Neck ROM: full  Dental      Pulmonary - negative pulmonary ROS   Cardiovascular - negative cardio ROS        Neuro/Psych- negative ROS  GI/Hepatic/Renal/Endo    (+)  hyperthyroidism (Graves disease-T4, TSH normal last checked 1-2018)    Musculoskeletal (-) negative ROS    Abdominal    Substance History - negative use     OB/GYN    (+) Pregnant,         Other - negative ROS                       Anesthesia Plan    ASA 2     spinal and ITN     Anesthetic plan and risks discussed with patient.

## 2018-02-07 NOTE — H&P
[facility]  Estela Figueroa  : 1985  MRN: [id number]  CSN: [id number]    History and Physical    Subjective   Estela Figueroa is a 32 y.o. year old  with an Estimated Date of Delivery: 18 scheduled for  delivery due to previous C/S - desired  but spontaenous onset of labor failed to occur.  Her placental location is posterior.  She is not planning for sterilization at the time of the .    Prenatal care has been with Dr. Baxter.  It has been complicated by history of Grave's disease, well controlled this pregnancy on methimazole..    Obstetric History       T0      L1     SAB0   TAB0   Ectopic0   Multiple0   Live Births1       # Outcome Date GA Lbr Darwin/2nd Weight Sex Delivery Anes PTL Lv   2 Current            1  16 35w6d  2653 g (5 lb 13.6 oz) M CS-LTranv Spinal N MIKE      Name: YUE FIGUEROA      Apgar1:  6                Apgar5: 8        Past Medical History:   Diagnosis Date   • Abnormal Pap smear of cervix    • Chlamydia    • Graves disease    • HPV (human papilloma virus) infection    • Urinary tract infection      Past Surgical History:   Procedure Laterality Date   •  SECTION Bilateral 2016    Procedure:  SECTION PRIMARY;  Surgeon: Julissa Fuentes MD;  Location: [facility] LABOR DELIVERY;  Service:    • WISDOM TOOTH EXTRACTION         Current Facility-Administered Medications:   •  ceFAZolin in dextrose (ANCEF) IVPB solution 2 g, 2 g, Intravenous, Once, Padmini Baxter MD  •  ceFAZolin in dextrose (ANCEF) IVPB solution 2 g, 2 g, Intravenous, Once, Padmini Baxter MD  •  lactated ringers bolus 1,000 mL, 1,000 mL, Intravenous, Once, Padmini Baxter MD  •  lactated ringers infusion, 125 mL/hr, Intravenous, Continuous, Padmini Baxter MD  •  lactated ringers infusion, 125 mL/hr, Intravenous, Once, Padmini Baxter MD  •  lidocaine (XYLOCAINE) 1 % injection 5 mL, 5 mL, Intradermal, PRN, Padmini Baxter MD  •   Sod Citrate-Citric Acid (BICITRA) solution 30 mL, 30 mL, Oral, Once, Padmini Baxter MD  •  Sod Citrate-Citric Acid (BICITRA) solution 30 mL, 30 mL, Oral, Once, Padmini Baxter MD  •  sodium chloride 0.9 % flush 1-10 mL, 1-10 mL, Intravenous, PRN, Padmini Baxter MD    No Known Allergies    Smoking status: Never Smoker                                                              Smokeless status: Never Used                        Review of Systems   Constitutional: Negative for fever.   Respiratory: Negative for shortness of breath.    Cardiovascular: Negative for chest pain and leg swelling.   Gastrointestinal: Negative for nausea and vomiting.   Genitourinary: Negative for dysuria and vaginal bleeding.   Neurological: Negative for headaches.         Objective   Breastfeeding? Yes  General: well developed; well nourished  no acute distress   Heart: regular rate and rhythm   Lungs: breathing is unlabored   Abdomen: soft, non-tender; no masses     Prenatal Labs  Lab Results   Component Value Date    HGB 11.4 (L) 2018    RUBELLAABIGG 462.6 2017    RUBELLAABIGG Immune 2017    HEPBSAG Non-Reactive 2017    LABRPR Non-reactive 2016    ABORH O Rh Positive 2016    ABSCRN Negative 2018    LABANTI Negative 2016    ETNGCWX93 NonReactive 2016    FOF1MPF8 Non-Reactive 2017    HEPCVIRUSABY non reactive 2017     2017    URINECX No growth at 2 days 2017       Recent Labs  Lab Results   Component Value Date    HGB 11.4 (L) 2018    HCT 36.6 2018    WBC 9.23 2018     2018           Assessment   1. IUP with an Estimated Date of Delivery: 18  2. Planned  section for previous C/S - desired  but spontaenous onset of labor failed to occur     Plan   1. Repeat   2. ABx and DVT prophylaxis    Casimiro Stanley MD  2018

## 2018-02-08 LAB
BASOPHILS # BLD AUTO: 0.01 10*3/MM3 (ref 0–0.2)
BASOPHILS NFR BLD AUTO: 0.1 % (ref 0–1)
DEPRECATED RDW RBC AUTO: 46.7 FL (ref 37–54)
EOSINOPHIL # BLD AUTO: 0.09 10*3/MM3 (ref 0–0.3)
EOSINOPHIL NFR BLD AUTO: 1.2 % (ref 0–3)
ERYTHROCYTE [DISTWIDTH] IN BLOOD BY AUTOMATED COUNT: 15 % (ref 11.3–14.5)
HCT VFR BLD AUTO: 33.3 % (ref 34.5–44)
HGB BLD-MCNC: 10.6 G/DL (ref 11.5–15.5)
IMM GRANULOCYTES # BLD: 0.02 10*3/MM3 (ref 0–0.03)
IMM GRANULOCYTES NFR BLD: 0.3 % (ref 0–0.6)
LYMPHOCYTES # BLD AUTO: 1.91 10*3/MM3 (ref 0.6–4.8)
LYMPHOCYTES NFR BLD AUTO: 25.5 % (ref 24–44)
MCH RBC QN AUTO: 27.2 PG (ref 27–31)
MCHC RBC AUTO-ENTMCNC: 31.8 G/DL (ref 32–36)
MCV RBC AUTO: 85.4 FL (ref 80–99)
MONOCYTES # BLD AUTO: 0.36 10*3/MM3 (ref 0–1)
MONOCYTES NFR BLD AUTO: 4.8 % (ref 0–12)
NEUTROPHILS # BLD AUTO: 5.1 10*3/MM3 (ref 1.5–8.3)
NEUTROPHILS NFR BLD AUTO: 68.1 % (ref 41–71)
PLATELET # BLD AUTO: 185 10*3/MM3 (ref 150–450)
PMV BLD AUTO: 11.6 FL (ref 6–12)
RBC # BLD AUTO: 3.9 10*6/MM3 (ref 3.89–5.14)
WBC NRBC COR # BLD: 7.49 10*3/MM3 (ref 3.5–10.8)

## 2018-02-08 PROCEDURE — 85025 COMPLETE CBC W/AUTO DIFF WBC: CPT | Performed by: OBSTETRICS & GYNECOLOGY

## 2018-02-08 RX ADMIN — PRENATAL VIT W/ FE FUMARATE-FA TAB 27-0.8 MG 1 TABLET: 27-0.8 TAB at 08:35

## 2018-02-08 RX ADMIN — SIMETHICONE CHEW TAB 80 MG 80 MG: 80 TABLET ORAL at 08:35

## 2018-02-08 RX ADMIN — IBUPROFEN 600 MG: 600 TABLET, FILM COATED ORAL at 16:38

## 2018-02-08 RX ADMIN — DOCUSATE SODIUM 100 MG: 100 CAPSULE, LIQUID FILLED ORAL at 08:35

## 2018-02-08 RX ADMIN — SIMETHICONE CHEW TAB 80 MG 80 MG: 80 TABLET ORAL at 22:04

## 2018-02-08 RX ADMIN — OXYCODONE AND ACETAMINOPHEN 2 TABLET: 5; 325 TABLET ORAL at 04:47

## 2018-02-08 RX ADMIN — IBUPROFEN 600 MG: 600 TABLET, FILM COATED ORAL at 22:04

## 2018-02-08 RX ADMIN — SIMETHICONE CHEW TAB 80 MG 80 MG: 80 TABLET ORAL at 16:38

## 2018-02-08 RX ADMIN — DOCUSATE SODIUM 100 MG: 100 CAPSULE, LIQUID FILLED ORAL at 22:04

## 2018-02-08 RX ADMIN — METHIMAZOLE 5 MG: 10 TABLET ORAL at 08:35

## 2018-02-08 RX ADMIN — IBUPROFEN 600 MG: 600 TABLET, FILM COATED ORAL at 08:35

## 2018-02-08 NOTE — ANESTHESIA POSTPROCEDURE EVALUATION
Patient: Estela Angela    Procedure Summary     Date Anesthesia Start Anesthesia Stop Room / Location    18 1100 1218 [facility] DEL[facility]   [facility]       Procedure Diagnosis Surgeon Provider     SECTION REPEAT *39WKS* (N/A Abdomen) No diagnosis on file. MD Gregory Paige MD          Anesthesia Type: spinal, ITN  Last vitals  BP   97/50 (18 07)   Temp   98.3 °F (36.8 °C) (18)   Pulse   73 (18 07)   Resp   18 (18 07)     SpO2   100 % (18 1345)     Post Anesthesia Care and Evaluation    Patient location during evaluation: bedside  Patient participation: complete - patient participated  Level of consciousness: awake and alert  Pain management: adequate  Airway patency: patent  Anesthetic complications: No anesthetic complications    Cardiovascular status: acceptable  Respiratory status: acceptable  Hydration status: acceptable  Post Neuraxial Block status: Motor and sensory function returned to baseline and No signs or symptoms of PDPH

## 2018-02-08 NOTE — LACTATION NOTE
This note was copied from a baby's chart.  Assisted with nursing.  Baby is sleepy and having difficulty latching. Continues to break latch and pop on and off nipple shield.  Formula used behind nipple shield and in baby's mouth to stimulate suck and assist with latching.  Baby finally did get and keep latch but was very sleepy at the breast.  Parents educated on ways to keep baby awake and nursing.

## 2018-02-08 NOTE — PROGRESS NOTES
2018    Name:Estela Angela    MR#:[id number]     PROGRESS NOTE:  Post-Op 1 S/P        Subjective   32 y.o. yo Female  s/p CS at 40w1d doing well. Pain well controlled, lochia appropriate    Patient Active Problem List   Diagnosis   • Single delivery by  section   • Graves disease   • Term pregnancy        Objective    Vitals  Temp:  Temp:  [97.4 °F (36.3 °C)-98.6 °F (37 °C)] 98.3 °F (36.8 °C)  Temp src: Oral  BP:  BP: ()/(42-91) 101/55  Pulse:  Heart Rate:  [55-94] 71  RR:   Resp:  [16] 16    General Awake, alert, no distress  Abdomen Soft, non-distended, fundus firm, below umbilicus, appropriately tender  Incision  Intact, no erythema or exudate  Extremities Calves NT bilaterally     I/O last 3 completed shifts:  In: 2000 [I.V.:2000]  Out: 4650 [Urine:3850; Blood:800]    LABS:   Lab Results   Component Value Date    WBC 9.23 2018    HGB 11.4 (L) 2018    HCT 36.6 2018    MCV 86.3 2018     2018       Infant: male       Assessment   1.  POD 1 from  Section     Plan: Doing well.    D/C iv, advance diet, may shower.  AM labs pending  Considering d/c home tomorrow        Padmini Baxter MD  2018 7:21 AM

## 2018-02-09 VITALS
RESPIRATION RATE: 14 BRPM | HEART RATE: 78 BPM | SYSTOLIC BLOOD PRESSURE: 110 MMHG | DIASTOLIC BLOOD PRESSURE: 70 MMHG | OXYGEN SATURATION: 100 % | TEMPERATURE: 98.5 F

## 2018-02-09 PROBLEM — Z98.891 S/P CESAREAN SECTION: Status: ACTIVE | Noted: 2018-02-09

## 2018-02-09 PROBLEM — E05.00 GRAVES DISEASE: Status: RESOLVED | Noted: 2017-08-09 | Resolved: 2018-02-09

## 2018-02-09 PROBLEM — Z34.90 TERM PREGNANCY: Status: RESOLVED | Noted: 2018-02-05 | Resolved: 2018-02-09

## 2018-02-09 RX ORDER — OXYCODONE HYDROCHLORIDE AND ACETAMINOPHEN 5; 325 MG/1; MG/1
1-2 TABLET ORAL EVERY 4 HOURS PRN
Qty: 40 TABLET | Refills: 0 | Status: SHIPPED | OUTPATIENT
Start: 2018-02-09 | End: 2018-02-17

## 2018-02-09 RX ORDER — PSEUDOEPHEDRINE HCL 30 MG
1 TABLET ORAL 2 TIMES DAILY
Qty: 60 CAPSULE | Refills: 0 | Status: SHIPPED | OUTPATIENT
Start: 2018-02-09 | End: 2018-12-17

## 2018-02-09 RX ORDER — IBUPROFEN 600 MG/1
600 TABLET ORAL EVERY 6 HOURS PRN
Qty: 40 TABLET | Refills: 0 | Status: SHIPPED | OUTPATIENT
Start: 2018-02-09 | End: 2018-12-17

## 2018-02-09 RX ADMIN — DOCUSATE SODIUM 100 MG: 100 CAPSULE, LIQUID FILLED ORAL at 08:46

## 2018-02-09 RX ADMIN — OXYCODONE AND ACETAMINOPHEN 2 TABLET: 5; 325 TABLET ORAL at 02:43

## 2018-02-09 RX ADMIN — METHIMAZOLE 5 MG: 10 TABLET ORAL at 08:46

## 2018-02-09 RX ADMIN — OXYCODONE AND ACETAMINOPHEN 1 TABLET: 5; 325 TABLET ORAL at 07:37

## 2018-02-09 RX ADMIN — PRENATAL VIT W/ FE FUMARATE-FA TAB 27-0.8 MG 1 TABLET: 27-0.8 TAB at 08:46

## 2018-02-09 RX ADMIN — IBUPROFEN 600 MG: 600 TABLET, FILM COATED ORAL at 07:37

## 2018-02-09 RX ADMIN — SIMETHICONE CHEW TAB 80 MG 80 MG: 80 TABLET ORAL at 08:46

## 2018-02-09 NOTE — DISCHARGE SUMMARY
[facility]   Discharge Summary      Patient: Estela Angela      MR#:[id number]  Admission  Diagnosis:   Problem List Items Addressed This Visit     RESOLVED: Single delivery by  section - Primary    Relevant Orders    CBC (No Diff) (Completed)    Type and screen (Completed)          Discharge Diagnosis:   1. Single delivery by  section        Date of Admission: 2018  Date of Discharge:  2018    Procedures:  , Low Transverse     2018    11:34 AM      Service:  Obstetrics    Hospital Course:  Patient underwent  section and remained in the hospital for 2 days.  During that time she remained afebrile and hemodynamically stable.  On the day of discharge, she was eating, ambulating and voiding without difficulty.      Labs  Lab Results   Component Value Date    WBC 7.49 2018    HGB 10.6 (L) 2018    HCT 33.3 (L) 2018    MCV 85.4 2018     2018    AST 11 2017    ALT 10 2017       Results from last 7 days  Lab Units 18  1308   ABO TYPING  O   RH TYPING  Positive   ANTIBODY SCREEN  Negative       Discharge Medications   Estela Angela   Home Medication Instructions LANA:[id number]    Printed on:18 0813   Medication Information                      docusate sodium 100 MG capsule  Take 100 mg by mouth 2 (Two) Times a Day.             ibuprofen (ADVIL,MOTRIN) 600 MG tablet  Take 1 tablet by mouth Every 6 (Six) Hours As Needed for Mild Pain .             methIMAzole (TAPAZOLE) 5 MG tablet  Take 1 tablet by mouth Daily.             oxyCODONE-acetaminophen (PERCOCET) 5-325 MG per tablet  Take 1-2 tablets by mouth Every 4 (Four) Hours As Needed for Moderate Pain  for up to 8 days.             Prenat-FeFmCb-DSS-FA-DHA w/o A (CITRANATAL HARMONY) 27-1-260 MG capsule                   Discharge Disposition:  To Home    Discharge Condition:  Stable    Discharge Diet: regular    Activity at Discharge: pelvic  rest    Follow-up Appointments  2 weeks    Padmini Baxter MD  02/09/18  8:13 AM

## 2018-02-09 NOTE — LACTATION NOTE
02/09/18 0827   Maternal Information   Person Making Referral other (see comments)  (Courtesy visit.)   Maternal Reason for Referral breastfeeding currently   Maternal Infant Assessment   Size Issue, Bilateral Breasts no   Shape, Bilateral Breasts round   Density, Bilateral Breasts soft   Additional Documentation (Latch) LATCH Score (Group)   LATCH Score   Latch 2-->grasps breast, tongue down, lips flanged, rhythmic sucking   Audible Swallowing 1-->a few with stimulation   Type Of Nipple 2-->everted (after stimulation)   Comfort (Breast/Nipple) 2-->soft/nontender   Hold (Positioning) 2-->no assist from staff, mother able to position/hold infant   Score (less than 7 for 2/more consecutive times, consult Lactation Consultant) 9   Feeding Infant   Effective Latch During Feeding yes   Audible Swallow yes   Suck/Swallow Coordination present

## 2018-02-09 NOTE — PLAN OF CARE
Problem: Patient Care Overview (Adult)  Goal: Plan of Care Review  Outcome: Ongoing (interventions implemented as appropriate)   02/09/18 0828   Coping/Psychosocial Response Interventions   Plan Of Care Reviewed With patient;significant other   Patient Care Overview   Progress progress toward functional goals as expected   Outcome Evaluation   Outcome Summary/Follow up Plan Continue to feed on demand and supplement per MD and pump. Baby latches well.

## 2018-02-09 NOTE — PROGRESS NOTES
2018    Name:Estela Angela    MR#:[id number]     PROGRESS NOTE:  Post-Op 2 S/P        Subjective   32 y.o. yo Female  s/p CS at 40w1d doing well. Pain well controlled, lochia appropriate, tolerating diet. Desires d/c home    Patient Active Problem List   Diagnosis   • S/P  section        Objective    Vitals  Temp:  Temp:  [97.8 °F (36.6 °C)-98.8 °F (37.1 °C)] 98.5 °F (36.9 °C)  Temp src: Oral  BP:  BP: ()/(51-70) 110/70  Pulse:  Heart Rate:  [65-80] 78  RR:   Resp:  [14-16] 14    General Awake, alert, no distress  Abdomen Soft, non-distended, fundus firm, below umbilicus, appropriately tender  Incision  Intact, no erythema or exudate  Extremities Calves NT bilaterally     I/O last 3 completed shifts:  In: -   Out: 4850 [Urine:4850]    LABS:   Lab Results   Component Value Date    WBC 7.49 2018    HGB 10.6 (L) 2018    HCT 33.3 (L) 2018    MCV 85.4 2018     2018       Infant: male       Assessment   1.  POD 2 from  Section    Plan: Doing well.    Desires d/c home.  Precautions reviewed.  RTC 2w for incision check          Padmini Baxter MD  2018 8:12 AM

## 2018-03-14 ENCOUNTER — OFFICE VISIT (OUTPATIENT)
Dept: ENDOCRINOLOGY | Facility: CLINIC | Age: 33
End: 2018-03-14

## 2018-03-14 VITALS
OXYGEN SATURATION: 99 % | BODY MASS INDEX: 23.86 KG/M2 | HEART RATE: 58 BPM | WEIGHT: 139 LBS | DIASTOLIC BLOOD PRESSURE: 60 MMHG | SYSTOLIC BLOOD PRESSURE: 102 MMHG

## 2018-03-14 DIAGNOSIS — E05.00 GRAVES' DISEASE: Primary | ICD-10-CM

## 2018-03-14 LAB
T4 FREE SERPL-MCNC: 1.15 NG/DL (ref 0.89–1.76)
TSH SERPL DL<=0.05 MIU/L-ACNC: 0.56 MIU/ML (ref 0.35–5.35)

## 2018-03-14 PROCEDURE — 99213 OFFICE O/P EST LOW 20 MIN: CPT | Performed by: INTERNAL MEDICINE

## 2018-03-14 PROCEDURE — 84443 ASSAY THYROID STIM HORMONE: CPT | Performed by: INTERNAL MEDICINE

## 2018-03-14 PROCEDURE — 84439 ASSAY OF FREE THYROXINE: CPT | Performed by: INTERNAL MEDICINE

## 2018-03-14 PROCEDURE — 83520 IMMUNOASSAY QUANT NOS NONAB: CPT | Performed by: INTERNAL MEDICINE

## 2018-03-14 NOTE — PROGRESS NOTES
Chief Complaint   Patient presents with   • Hyperthyrpoidism     follow up     HPI:   Estela Angela is a 32 y.o.female who [facility] for f/u evaluation of graves disease. Last visit 2017. Her history is as follows:    Interim Events:  - delivered 2018. . No post-op complications.  - Pt stopped her methimazole after delivery. Has felt well since.     1) Graves disease:   - first diagnosed in , by Dr. Merritt. Has been on thionamide therapy intermittently (mainly methimazole) since . Presented with palpitations, weight loss, and tremor.  - In 2016 had her first pregnancy (took PTU during that pregnancy). Postpartum was switched to methimazole.  - 18 months later, became pregnant with second child. Was on methimazole for a few weeks in the first trimester. Was switched to PTU. At 15 weeks, I switched to methimazole per current guidelines.     No methimazole since 2018  Overall feels well    Review of Systems   Constitutional: Positive for fatigue.        Appropriate weight loss   HENT: Negative.    Eyes: Negative.    Respiratory: Negative.    Cardiovascular: Negative.  Negative for palpitations.   Endocrine: Negative.    Genitourinary: Negative.    Musculoskeletal: Negative.    Skin: Negative.    Allergic/Immunologic: Negative.    Neurological: Negative.  Negative for tremors.   Hematological: Negative.    Psychiatric/Behavioral: Negative.         Postpartum depression resolved     The following portions of the patient's history were reviewed and updated as appropriate: allergies, current medications, past family history, past medical history, past social history, past surgical history and problem list.    /60   Pulse 58   Wt 63 kg (139 lb)   SpO2 99%   BMI 23.86 kg/m²   Physical Exam   Constitutional: She is oriented to person, place, and time. She appears well-developed. No distress.   HENT:   Head: Normocephalic.   Mouth/Throat: Oropharynx is clear  and moist.   Eyes: Conjunctivae and EOM are normal. Pupils are equal, round, and reactive to light.   Neck: No tracheal deviation present. No thyromegaly present.   No palpable thyroid nodules     Cardiovascular: Normal rate, regular rhythm and normal heart sounds.    No murmur heard.  Pulmonary/Chest: Effort normal and breath sounds normal. No respiratory distress.   Abdominal: Soft. Bowel sounds are normal. There is no tenderness.   Lymphadenopathy:     She has no cervical adenopathy.   Neurological: She is alert and oriented to person, place, and time. No cranial nerve deficit.   Skin: Skin is warm and dry. She is not diaphoretic. No erythema.   Psychiatric: She has a normal mood and affect. Her behavior is normal.   Vitals reviewed.    LABS/IMAGING: outside records reviewed and summarized in HPI  Results for orders placed or performed in visit on 03/14/18   TSH   Result Value Ref Range    TSH 0.562 0.350 - 5.350 mIU/mL   T4, Free   Result Value Ref Range    Free T4 1.15 0.89 - 1.76 ng/dL   Thyrotropin Receptor Antibody   Result Value Ref Range    Thyrotropin Receptor Antibody <0.50 0.00 - 1.75 IU/L     ASSESSMENT/PLAN:  1) Graves disease:  - pt clinically euthyroid on exam, off methimazole 02/07/18  - TFT's checked today were WNL  - TSH receptor Ab negative  - Pt possibly in remission    - continue to stay off methimazole   - will check TFTs at follow-up    RTC 4 months

## 2018-03-16 LAB — TSH RECEP AB SER-ACNC: <0.5 IU/L (ref 0–1.75)

## 2018-07-13 DIAGNOSIS — R00.2 PALPITATIONS: Primary | ICD-10-CM

## 2018-08-02 ENCOUNTER — LAB (OUTPATIENT)
Dept: LAB | Facility: HOSPITAL | Age: 33
End: 2018-08-02

## 2018-08-02 DIAGNOSIS — R00.2 PALPITATIONS: ICD-10-CM

## 2018-08-02 LAB
T4 FREE SERPL-MCNC: 2.92 NG/DL (ref 0.89–1.76)
TSH SERPL DL<=0.05 MIU/L-ACNC: <0.004 MIU/ML (ref 0.35–5.35)

## 2018-08-02 PROCEDURE — 84439 ASSAY OF FREE THYROXINE: CPT

## 2018-08-02 PROCEDURE — 84443 ASSAY THYROID STIM HORMONE: CPT

## 2018-08-02 PROCEDURE — 84480 ASSAY TRIIODOTHYRONINE (T3): CPT

## 2018-08-03 ENCOUNTER — TELEPHONE (OUTPATIENT)
Dept: ENDOCRINOLOGY | Facility: CLINIC | Age: 33
End: 2018-08-03

## 2018-08-03 DIAGNOSIS — E05.00 GRAVES' DISEASE: Primary | ICD-10-CM

## 2018-08-03 LAB — T3 SERPL-MCNC: 316 NG/DL (ref 71–180)

## 2018-08-03 RX ORDER — METHIMAZOLE 5 MG/1
5 TABLET ORAL DAILY
Qty: 30 TABLET | Refills: 5 | Status: SHIPPED | OUTPATIENT
Start: 2018-08-03 | End: 2018-12-17 | Stop reason: DRUGHIGH

## 2018-08-03 NOTE — TELEPHONE ENCOUNTER
Spoke to patient about lab results. Will restart methimazole 5 mg daily. I have moved her  Clinic appt from 08/06 to 12/2018.  Vidya Sousa MD

## 2018-12-10 DIAGNOSIS — E05.00 GRAVES' DISEASE: Primary | ICD-10-CM

## 2018-12-14 ENCOUNTER — LAB (OUTPATIENT)
Dept: INTERNAL MEDICINE | Facility: CLINIC | Age: 33
End: 2018-12-14

## 2018-12-14 DIAGNOSIS — E05.00 GRAVES' DISEASE: ICD-10-CM

## 2018-12-14 LAB
T4 FREE SERPL-MCNC: 5.16 NG/DL (ref 0.89–1.76)
TSH SERPL DL<=0.05 MIU/L-ACNC: <0.004 MIU/ML (ref 0.35–5.35)

## 2018-12-14 PROCEDURE — 84443 ASSAY THYROID STIM HORMONE: CPT | Performed by: INTERNAL MEDICINE

## 2018-12-14 PROCEDURE — 84439 ASSAY OF FREE THYROXINE: CPT | Performed by: INTERNAL MEDICINE

## 2018-12-17 ENCOUNTER — OFFICE VISIT (OUTPATIENT)
Dept: ENDOCRINOLOGY | Facility: CLINIC | Age: 33
End: 2018-12-17

## 2018-12-17 VITALS
HEIGHT: 64 IN | DIASTOLIC BLOOD PRESSURE: 60 MMHG | OXYGEN SATURATION: 99 % | BODY MASS INDEX: 22.53 KG/M2 | HEART RATE: 101 BPM | WEIGHT: 132 LBS | SYSTOLIC BLOOD PRESSURE: 110 MMHG

## 2018-12-17 DIAGNOSIS — E05.00 GRAVES' DISEASE: Primary | ICD-10-CM

## 2018-12-17 PROCEDURE — 99213 OFFICE O/P EST LOW 20 MIN: CPT | Performed by: INTERNAL MEDICINE

## 2018-12-17 RX ORDER — METHIMAZOLE 10 MG/1
TABLET ORAL
Qty: 90 TABLET | Refills: 0 | Status: SHIPPED | OUTPATIENT
Start: 2018-12-17 | End: 2019-02-22 | Stop reason: SDUPTHER

## 2018-12-17 NOTE — PROGRESS NOTES
"Chief Complaint   Patient presents with   • Graves' Disease     f/u      HPI:   Estela Angela is a 33 y.o.female who returns to [facility] for f/u evaluation of graves disease. Last visit 03/14/2018. Her history is as follows:    Interim Events:  - pt now 10 months postpartum  - restarted methimazole 5 mg dialy in 08/2018  - has noted palpitations, tremor since August 1) Graves disease:   - first diagnosed in 2013, by Dr. Merritt. Has been on thionamide therapy intermittently (mainly methimazole) since 2013. Presented with palpitations, weight loss, and tremor.  - In 09/2016 had her first pregnancy (took PTU during that pregnancy). Postpartum was switched to methimazole.  - 18 months later, became pregnant with second child. Was on methimazole for a few weeks in the first trimester. Was switched to PTU. At 15 weeks, I switched to methimazole per current guidelines.   - pt stopped the methimazole post-partum in 02/07/2018, TSH Rec Ab <0.50 in 03/2018  - restarted methimazole 5 mg dialy in 08/2018: TSH <0.004, free T4 2.92 (0.89 - 1.76), T3 316    Current Dose: methimazole 5 mg daily    Review of Systems   Constitutional: Positive for fatigue.        Weight loss   HENT: Negative.    Eyes: Negative.    Respiratory: Negative.    Cardiovascular: Positive for palpitations.   Gastrointestinal: Negative.  Negative for diarrhea.   Endocrine: Positive for heat intolerance.   Genitourinary: Negative.    Musculoskeletal: Negative.    Skin: Negative.    Allergic/Immunologic: Negative.    Neurological: Positive for tremors.   Hematological: Negative.    Psychiatric/Behavioral: Positive for sleep disturbance.        Postpartum depression resolved     The following portions of the patient's history were reviewed and updated as appropriate: allergies, current medications, past family history, past medical history, past social history, past surgical history and problem list.    /60   Pulse 101   Ht 162.6 cm (64\")  "  Wt 59.9 kg (132 lb)   SpO2 99%   Breastfeeding? Yes   BMI 22.66 kg/m²   Physical Exam   Constitutional: She is oriented to person, place, and time. She appears well-developed. No distress.   HENT:   Head: Normocephalic.   Mouth/Throat: Oropharynx is clear and moist.   Eyes: Conjunctivae and EOM are normal. Pupils are equal, round, and reactive to light.   Neck: No tracheal deviation present. Thyromegaly (40 gm gland) present.   No palpable thyroid nodules     Cardiovascular: Regular rhythm and normal heart sounds. Tachycardia present.   No murmur heard.  Pulmonary/Chest: Effort normal and breath sounds normal. No respiratory distress.   Abdominal: Soft. Bowel sounds are normal. There is no tenderness.   Lymphadenopathy:     She has no cervical adenopathy.   Neurological: She is alert and oriented to person, place, and time. No cranial nerve deficit.   Tremor in hands   Skin: Skin is warm and dry. She is not diaphoretic. No erythema.   Psychiatric: She has a normal mood and affect. Her behavior is normal.   Vitals reviewed.    LABS/IMAGING: outside records reviewed and summarized in HPI  Results for orders placed or performed in visit on 12/14/18   T4, Free   Result Value Ref Range    Free T4 5.16 (H) 0.89 - 1.76 ng/dL   TSH   Result Value Ref Range    TSH <0.004 (L) 0.350 - 5.350 mIU/mL     ASSESSMENT/PLAN:  1) Graves disease:  - pt clinically mildly thyrotoxic on exam, TFT's checked shows worsening hyperthyroidism on the methimazole 5 mg daily  - Increase methimazole to 20 mg daily X 1 month, then decrease to 10 mg daily. Pt then to have TFT's checked in 2 months, will adjust dose as indicated.  - pt to call if having side effect from medication or having any concerning symptoms    RTC 5 months

## 2019-02-22 ENCOUNTER — LAB (OUTPATIENT)
Dept: LAB | Facility: HOSPITAL | Age: 34
End: 2019-02-22

## 2019-02-22 ENCOUNTER — TELEPHONE (OUTPATIENT)
Dept: ENDOCRINOLOGY | Facility: CLINIC | Age: 34
End: 2019-02-22

## 2019-02-22 DIAGNOSIS — E05.00 GRAVES' DISEASE: ICD-10-CM

## 2019-02-22 DIAGNOSIS — E05.00 GRAVES' DISEASE: Primary | ICD-10-CM

## 2019-02-22 LAB
T4 FREE SERPL-MCNC: 3.31 NG/DL (ref 0.89–1.76)
TSH SERPL DL<=0.05 MIU/L-ACNC: <0.004 MIU/ML (ref 0.35–5.35)

## 2019-02-22 PROCEDURE — 84439 ASSAY OF FREE THYROXINE: CPT

## 2019-02-22 PROCEDURE — 84443 ASSAY THYROID STIM HORMONE: CPT

## 2019-02-22 RX ORDER — METHIMAZOLE 10 MG/1
20 TABLET ORAL DAILY
Qty: 60 TABLET | Refills: 5 | Status: SHIPPED | OUTPATIENT
Start: 2019-02-22 | End: 2019-05-24

## 2019-02-22 NOTE — TELEPHONE ENCOUNTER
Spoke to patient about lab results. Advised pt to take methimazole 20 mg daily until hr f/u visit with me. New Rx sent to pt's pharmacy.   Vidya Sousa MD

## 2019-04-18 DIAGNOSIS — E05.00 GRAVES' DISEASE: Primary | ICD-10-CM

## 2019-04-19 ENCOUNTER — LAB (OUTPATIENT)
Dept: LAB | Facility: HOSPITAL | Age: 34
End: 2019-04-19

## 2019-04-19 DIAGNOSIS — E05.00 GRAVES' DISEASE: ICD-10-CM

## 2019-04-19 LAB
T4 FREE SERPL-MCNC: 1.94 NG/DL (ref 0.93–1.7)
TSH SERPL DL<=0.05 MIU/L-ACNC: <0.005 MIU/ML (ref 0.27–4.2)

## 2019-04-19 PROCEDURE — 84443 ASSAY THYROID STIM HORMONE: CPT

## 2019-04-19 PROCEDURE — 36415 COLL VENOUS BLD VENIPUNCTURE: CPT

## 2019-04-19 PROCEDURE — 84439 ASSAY OF FREE THYROXINE: CPT

## 2019-05-22 ENCOUNTER — OFFICE VISIT (OUTPATIENT)
Dept: ENDOCRINOLOGY | Facility: CLINIC | Age: 34
End: 2019-05-22

## 2019-05-22 VITALS
WEIGHT: 136 LBS | BODY MASS INDEX: 23.22 KG/M2 | DIASTOLIC BLOOD PRESSURE: 78 MMHG | HEIGHT: 64 IN | OXYGEN SATURATION: 99 % | HEART RATE: 79 BPM | SYSTOLIC BLOOD PRESSURE: 100 MMHG

## 2019-05-22 DIAGNOSIS — E05.00 GRAVES' DISEASE: Primary | ICD-10-CM

## 2019-05-22 PROCEDURE — 84439 ASSAY OF FREE THYROXINE: CPT | Performed by: INTERNAL MEDICINE

## 2019-05-22 PROCEDURE — 99214 OFFICE O/P EST MOD 30 MIN: CPT | Performed by: INTERNAL MEDICINE

## 2019-05-22 PROCEDURE — 84443 ASSAY THYROID STIM HORMONE: CPT | Performed by: INTERNAL MEDICINE

## 2019-05-22 NOTE — PROGRESS NOTES
Chief Complaint   Patient presents with   • Graves' Disease     f/u      HPI:   Estela Angela is a 33 y.o.female who returns to [facility] for f/u evaluation of graves disease. Last visit 12/17/2018. Her history is as follows:    Interim Events:  - had to increase her methimazole again in 02/2019  - currently breastfeeding  - would like to pursue surgery for definitive treatment of her graves    1) Graves disease:   - first diagnosed in 2013, by Dr. Merritt. Has been on thionamide therapy intermittently (mainly methimazole) since 2013. Presented with palpitations, weight loss, and tremor.  - In 09/2016 had her first pregnancy (took PTU during that pregnancy). Postpartum was switched to methimazole.  - 18 months later, became pregnant with second child. Was on methimazole for a few weeks in the first trimester. Was switched to PTU. At 15 weeks, I switched to methimazole per current guidelines.   - pt stopped the methimazole post-partum in 02/07/2018, TSH Rec Ab <0.50 in 03/2018  - restarted methimazole 5 mg dialy in 08/2018: TSH <0.004, free T4 2.92 (0.89 - 1.76), T3 316  - had to increase her dose in 02/2019    Current Dose: methimazole 20 mg daily    Review of Systems   Constitutional: Positive for fatigue.        Weight stable   HENT: Negative.    Eyes: Negative.    Respiratory: Negative.    Cardiovascular: Positive for palpitations.   Gastrointestinal: Negative.  Negative for diarrhea.   Endocrine: Negative for heat intolerance.   Genitourinary: Negative.    Musculoskeletal: Negative.    Skin: Negative.    Allergic/Immunologic: Negative.    Neurological: Negative for tremors.   Hematological: Negative.    Psychiatric/Behavioral: Negative for sleep disturbance.        Anxiety     The following portions of the patient's history were reviewed and updated as appropriate: allergies, current medications, past family history, past medical history, past social history, past surgical history and problem  "list.    /78   Pulse 79   Ht 162.6 cm (64\")   Wt 61.7 kg (136 lb)   SpO2 99%   BMI 23.34 kg/m²   Physical Exam   Constitutional: She is oriented to person, place, and time. She appears well-developed. No distress.   HENT:   Head: Normocephalic.   Mouth/Throat: Oropharynx is clear and moist.   Eyes: Conjunctivae and EOM are normal. Pupils are equal, round, and reactive to light.   Neck: No tracheal deviation present. Thyromegaly (40 gm gland) present.   No palpable thyroid nodules     Cardiovascular: Normal rate, regular rhythm and normal heart sounds.   No murmur heard.  Pulmonary/Chest: Effort normal and breath sounds normal. No respiratory distress.   Abdominal: Soft. Bowel sounds are normal. There is no tenderness.   Lymphadenopathy:     She has no cervical adenopathy.   Neurological: She is alert and oriented to person, place, and time. No cranial nerve deficit.   No Tremor in hands   Skin: Skin is warm and dry. She is not diaphoretic. No erythema.   Psychiatric: She has a normal mood and affect. Her behavior is normal.   Vitals reviewed.    LABS/IMAGING: outside records reviewed and summarized in HPI  Results for orders placed or performed in visit on 05/22/19   TSH   Result Value Ref Range    TSH <0.005 (L) 0.270 - 4.200 mIU/mL   T4, Free   Result Value Ref Range    Free T4 1.57 0.93 - 1.70 ng/dL     ASSESSMENT/PLAN:  1) Graves disease:  - pt clinically euthyroid on exam  - TFT's checked today showing improvement on methimazole  - Continue methimazole to 20 mg daily until June 1, 2019, then decrease to 10 mg daily.   - discussed treatment options again with patient. Pt has contraindications for TRUONG. Recommended surgery for definitive treatment.  - Will send pt in consultation to Endocrine Surgery for total thyroidectomy   - pt to call if having side effect from medication or having any concerning symptoms    RTC 6 months    Counseling was given to patient for the following topics:  instructions for " management and risks and benefits of treatment options, see details in assessment/plan. Total face to face time of the encounter was 30 minutes and 25 minutes was spent counseling.

## 2019-05-23 LAB
T4 FREE SERPL-MCNC: 1.57 NG/DL (ref 0.93–1.7)
TSH SERPL DL<=0.05 MIU/L-ACNC: <0.005 MIU/ML (ref 0.27–4.2)

## 2019-05-24 ENCOUNTER — TELEPHONE (OUTPATIENT)
Dept: ENDOCRINOLOGY | Facility: CLINIC | Age: 34
End: 2019-05-24

## 2019-05-24 RX ORDER — METHIMAZOLE 10 MG/1
10 TABLET ORAL DAILY
Qty: 60 TABLET | Refills: 5
Start: 2019-05-24 | End: 2020-03-23 | Stop reason: SDUPTHER

## 2020-03-23 DIAGNOSIS — E05.00 GRAVES' DISEASE: ICD-10-CM

## 2020-03-23 RX ORDER — METHIMAZOLE 10 MG/1
10 TABLET ORAL DAILY
Qty: 30 TABLET | Refills: 3 | Status: SHIPPED | OUTPATIENT
Start: 2020-03-23 | End: 2020-04-16 | Stop reason: SDUPTHER

## 2020-04-08 ENCOUNTER — TELEMEDICINE (OUTPATIENT)
Dept: ENDOCRINOLOGY | Facility: CLINIC | Age: 35
End: 2020-04-08

## 2020-04-08 DIAGNOSIS — E05.00 GRAVES' DISEASE: Primary | ICD-10-CM

## 2020-04-08 PROCEDURE — 99213 OFFICE O/P EST LOW 20 MIN: CPT | Performed by: INTERNAL MEDICINE

## 2020-04-08 NOTE — PROGRESS NOTES
Chief Complaint   Patient presents with   • Graves' Disease     f/u     HPI:   Estela Angela is a 34 y.o.female who presents as a telemedicine video visit  for f/u evaluation of graves disease. Last clinic visit 05/22/2019. Her history is as follows:    Interim Events:  - Patient was scheduled for thyroidectomy on October 1, 2019 [facility] with Dr. Shari Ambriz.  However as the surgery date approached, she decided not to pursue thyroidectomy.   - Patient canceled December 2019 appointment  -Patient no longer breast-feeding    1) Graves disease:   - first diagnosed in 2013, by Dr. Merritt. Has been on thionamide therapy intermittently (mainly methimazole) since 2013. Presented with palpitations, weight loss, and tremor.  - In 09/2016 had her first pregnancy (took PTU during that pregnancy). Postpartum was switched to methimazole.  - 18 months later, became pregnant with second child. Was on methimazole for a few weeks in the first trimester. Was switched to PTU. At 15 weeks, I switched to methimazole per current guidelines.   - pt stopped the methimazole post-partum in 02/07/2018, TSH Rec Ab <0.50 in 03/2018  - restarted methimazole 5 mg dialy in 08/2018: TSH <0.004, free T4 2.92 (0.89 - 1.76), T3 316  - had to increase her dose in 02/2019  - Patient was scheduled for thyroidectomy on October 1, 2019 [facility] with Dr. Shari Ambriz.  However as the surgery date approached, she decided not to pursue thyroidectomy.  Continued methimazole therapy    Current Dose: methimazole 10 mg daily  -Denies missed doses  -Denies fever, sore throat, or pruritus    Review of Systems   Constitutional: Negative.  Negative for fatigue.        Weight stable   HENT: Negative.    Eyes: Negative.    Respiratory: Positive for shortness of breath (Upon exertion).    Cardiovascular: Negative.  Negative for palpitations.   Gastrointestinal: Negative.  Negative for diarrhea.   Endocrine: Negative for heat intolerance.   Genitourinary: Negative.     Musculoskeletal: Negative.    Skin: Negative.    Allergic/Immunologic: Negative.    Neurological: Negative for tremors.   Hematological: Negative.    Psychiatric/Behavioral: Negative for sleep disturbance.     The following portions of the patient's history were reviewed and updated as appropriate: allergies, current medications, past family history, past medical history, past social history, past surgical history and problem list.    Breastfeeding No   Physical Exam   Constitutional: She appears well-developed and well-nourished. No distress.     LABS/IMAGING: outside records reviewed and summarized in HPI  Results for orders placed or performed in visit on 05/22/19   TSH   Result Value Ref Range    TSH <0.005 (L) 0.270 - 4.200 mIU/mL   T4, Free   Result Value Ref Range    Free T4 1.57 0.93 - 1.70 ng/dL     ASSESSMENT/PLAN:  1) Graves disease:  - pt clinically euthyroid based on reported ROS  -Have ordered TFTs, CBC, CMP.  Patient will complete next Wednesday, April 15, 2020  - Continue methimazole 10 mg daily for now.  Will adjust dose as indicated after review of labs.   - pt to call if having side effect from medication or having any concerning symptoms    RTC 5 months

## 2020-04-15 ENCOUNTER — LAB (OUTPATIENT)
Dept: LAB | Facility: HOSPITAL | Age: 35
End: 2020-04-15

## 2020-04-15 DIAGNOSIS — E05.00 GRAVES' DISEASE: ICD-10-CM

## 2020-04-15 LAB
ALBUMIN SERPL-MCNC: 4 G/DL (ref 3.5–5.2)
ALBUMIN/GLOB SERPL: 1.4 G/DL
ALP SERPL-CCNC: 68 U/L (ref 39–117)
ALT SERPL W P-5'-P-CCNC: 13 U/L (ref 1–33)
ANION GAP SERPL CALCULATED.3IONS-SCNC: 12.8 MMOL/L (ref 5–15)
AST SERPL-CCNC: 9 U/L (ref 1–32)
BILIRUB SERPL-MCNC: 0.3 MG/DL (ref 0.2–1.2)
BUN BLD-MCNC: 13 MG/DL (ref 6–20)
BUN/CREAT SERPL: 18.6 (ref 7–25)
CALCIUM SPEC-SCNC: 9.7 MG/DL (ref 8.6–10.5)
CHLORIDE SERPL-SCNC: 102 MMOL/L (ref 98–107)
CO2 SERPL-SCNC: 25.2 MMOL/L (ref 22–29)
CREAT BLD-MCNC: 0.7 MG/DL (ref 0.57–1)
DEPRECATED RDW RBC AUTO: 38.5 FL (ref 37–54)
ERYTHROCYTE [DISTWIDTH] IN BLOOD BY AUTOMATED COUNT: 12.5 % (ref 12.3–15.4)
GFR SERPL CREATININE-BSD FRML MDRD: 96 ML/MIN/1.73
GLOBULIN UR ELPH-MCNC: 2.8 GM/DL
GLUCOSE BLD-MCNC: 76 MG/DL (ref 65–99)
HCT VFR BLD AUTO: 38.8 % (ref 34–46.6)
HGB BLD-MCNC: 13 G/DL (ref 12–15.9)
MCH RBC QN AUTO: 28.6 PG (ref 26.6–33)
MCHC RBC AUTO-ENTMCNC: 33.5 G/DL (ref 31.5–35.7)
MCV RBC AUTO: 85.3 FL (ref 79–97)
PLATELET # BLD AUTO: 212 10*3/MM3 (ref 140–450)
PMV BLD AUTO: 12.4 FL (ref 6–12)
POTASSIUM BLD-SCNC: 4 MMOL/L (ref 3.5–5.2)
PROT SERPL-MCNC: 6.8 G/DL (ref 6–8.5)
RBC # BLD AUTO: 4.55 10*6/MM3 (ref 3.77–5.28)
SODIUM BLD-SCNC: 140 MMOL/L (ref 136–145)
T4 FREE SERPL-MCNC: 2.23 NG/DL (ref 0.93–1.7)
TSH SERPL DL<=0.05 MIU/L-ACNC: 0.01 UIU/ML (ref 0.27–4.2)
WBC NRBC COR # BLD: 5.25 10*3/MM3 (ref 3.4–10.8)

## 2020-04-15 PROCEDURE — 80053 COMPREHEN METABOLIC PANEL: CPT

## 2020-04-15 PROCEDURE — 84439 ASSAY OF FREE THYROXINE: CPT

## 2020-04-15 PROCEDURE — 85027 COMPLETE CBC AUTOMATED: CPT

## 2020-04-15 PROCEDURE — 84443 ASSAY THYROID STIM HORMONE: CPT

## 2020-04-15 PROCEDURE — 36415 COLL VENOUS BLD VENIPUNCTURE: CPT

## 2020-04-16 ENCOUNTER — TELEPHONE (OUTPATIENT)
Dept: ENDOCRINOLOGY | Facility: CLINIC | Age: 35
End: 2020-04-16

## 2020-04-16 DIAGNOSIS — E05.00 GRAVES' DISEASE: Primary | ICD-10-CM

## 2020-04-16 RX ORDER — METHIMAZOLE 10 MG/1
20 TABLET ORAL DAILY
Qty: 180 TABLET | Refills: 0 | Status: SHIPPED | OUTPATIENT
Start: 2020-04-16 | End: 2020-07-03 | Stop reason: SDUPTHER

## 2020-04-16 NOTE — TELEPHONE ENCOUNTER
Spoke to patient about lab results. Increasing methimazole to 20 mg daily.   Labs in 2 months. Will adjust dose as indicated.  Signed: Vidya Sousa MD

## 2020-05-28 ENCOUNTER — TRANSCRIBE ORDERS (OUTPATIENT)
Dept: LAB | Facility: HOSPITAL | Age: 35
End: 2020-05-28

## 2020-05-28 ENCOUNTER — LAB (OUTPATIENT)
Dept: LAB | Facility: HOSPITAL | Age: 35
End: 2020-05-28

## 2020-05-28 DIAGNOSIS — R30.0 DYSURIA: ICD-10-CM

## 2020-05-28 DIAGNOSIS — E05.00 GRAVES' DISEASE: ICD-10-CM

## 2020-05-28 DIAGNOSIS — R30.0 DYSURIA: Primary | ICD-10-CM

## 2020-05-28 PROCEDURE — 87086 URINE CULTURE/COLONY COUNT: CPT

## 2020-05-28 PROCEDURE — 87077 CULTURE AEROBIC IDENTIFY: CPT

## 2020-05-28 PROCEDURE — 87186 SC STD MICRODIL/AGAR DIL: CPT

## 2020-05-30 LAB — BACTERIA SPEC AEROBE CULT: ABNORMAL

## 2020-06-09 DIAGNOSIS — E05.00 GRAVES' DISEASE: Primary | ICD-10-CM

## 2020-06-19 ENCOUNTER — LAB (OUTPATIENT)
Dept: LAB | Facility: HOSPITAL | Age: 35
End: 2020-06-19

## 2020-06-19 DIAGNOSIS — E05.00 GRAVES' DISEASE: ICD-10-CM

## 2020-06-19 PROCEDURE — 84439 ASSAY OF FREE THYROXINE: CPT

## 2020-06-19 PROCEDURE — 84443 ASSAY THYROID STIM HORMONE: CPT

## 2020-06-20 LAB
T4 FREE SERPL-MCNC: 1.33 NG/DL (ref 0.93–1.7)
TSH SERPL DL<=0.05 MIU/L-ACNC: 0.01 UIU/ML (ref 0.27–4.2)

## 2020-07-03 ENCOUNTER — TELEPHONE (OUTPATIENT)
Dept: ENDOCRINOLOGY | Facility: CLINIC | Age: 35
End: 2020-07-03

## 2020-07-03 DIAGNOSIS — E05.00 GRAVES' DISEASE: Primary | ICD-10-CM

## 2020-07-03 RX ORDER — METHIMAZOLE 10 MG/1
20 TABLET ORAL DAILY
Qty: 180 TABLET | Refills: 0 | Status: SHIPPED | OUTPATIENT
Start: 2020-07-03 | End: 2020-09-29

## 2020-07-03 NOTE — TELEPHONE ENCOUNTER
Spoke to patient about lab results. Will continue the methimazole 20 mg daily until her next visit in 09/2020.   Signed: Vidya Sousa MD

## 2020-09-16 ENCOUNTER — LAB (OUTPATIENT)
Dept: LAB | Facility: HOSPITAL | Age: 35
End: 2020-09-16

## 2020-09-16 ENCOUNTER — OFFICE VISIT (OUTPATIENT)
Dept: ENDOCRINOLOGY | Facility: CLINIC | Age: 35
End: 2020-09-16

## 2020-09-16 VITALS
BODY MASS INDEX: 24.82 KG/M2 | DIASTOLIC BLOOD PRESSURE: 60 MMHG | HEIGHT: 64 IN | RESPIRATION RATE: 16 BRPM | SYSTOLIC BLOOD PRESSURE: 100 MMHG | HEART RATE: 72 BPM | WEIGHT: 145.4 LBS | OXYGEN SATURATION: 100 %

## 2020-09-16 DIAGNOSIS — E05.00 GRAVES' DISEASE: Primary | ICD-10-CM

## 2020-09-16 PROCEDURE — 84443 ASSAY THYROID STIM HORMONE: CPT | Performed by: INTERNAL MEDICINE

## 2020-09-16 PROCEDURE — 84439 ASSAY OF FREE THYROXINE: CPT | Performed by: INTERNAL MEDICINE

## 2020-09-16 PROCEDURE — 99213 OFFICE O/P EST LOW 20 MIN: CPT | Performed by: INTERNAL MEDICINE

## 2020-09-16 NOTE — PROGRESS NOTES
Chief Complaint   Patient presents with   • Graves' Disease     follow-up     HPI:   Estela Angela is a 35 y.o.female who returns to [facility] for f/u evaluation of graves disease. Last visit (telemedicine) 04/08/2020. Her history is as follows:    Interim Events:  - Has been going to therapy for Anxiety  - Has gained weight since 04/2020  - denies constipation, denies arthralgias    1) Graves disease:   - first diagnosed in 2013, by Dr. Merritt. Has been on thionamide therapy intermittently (mainly methimazole) since 2013. Presented with palpitations, weight loss, and tremor.  - In 09/2016 had her first pregnancy (took PTU during that pregnancy). Postpartum was switched to methimazole.  - 18 months later, became pregnant with second child. Was on methimazole for a few weeks in the first trimester. Was switched to PTU. At 15 weeks, I switched to methimazole per current guidelines.   - pt stopped the methimazole post-partum in 02/07/2018, TSH Rec Ab <0.50 in 03/2018  - restarted methimazole 5 mg dialy in 08/2018: TSH <0.004, free T4 2.92 (0.89 - 1.76), T3 316  - had to increase her dose in 02/2019  - Patient was scheduled for thyroidectomy on October 1, 2019 [facility] with Dr. Shari Ambriz.  However as the surgery date approached, she decided not to pursue thyroidectomy.  Continued methimazole therapy    Current Dose: methimazole 20 mg daily, on this dose since 04/2020  -Denies missed doses  -Denies fever, sore throat, or pruritus    Review of Systems   Constitutional: Negative.  Negative for fatigue.        Weight gain   HENT: Negative.    Eyes: Negative.    Respiratory: Positive for shortness of breath (Upon exertion).    Cardiovascular: Negative.  Negative for palpitations.   Gastrointestinal: Negative.  Negative for diarrhea.   Endocrine: Negative for heat intolerance.   Genitourinary: Negative.    Musculoskeletal: Negative.    Skin: Negative.    Allergic/Immunologic: Negative.    Neurological: Negative for  "tremors.   Hematological: Negative.    Psychiatric/Behavioral: Negative for sleep disturbance.        Anxiety, intermittently     The following portions of the patient's history were reviewed and updated as appropriate: allergies, current medications, past family history, past medical history, past social history, past surgical history and problem list.    /60   Pulse 72   Resp 16   Ht 162.6 cm (64\")   Wt 66 kg (145 lb 6.4 oz)   SpO2 100%   BMI 24.96 kg/m²   Physical Exam   Constitutional: She is oriented to person, place, and time. She appears well-developed. No distress.   HENT:   Head: Normocephalic.   Eyes: Pupils are equal, round, and reactive to light. Conjunctivae are normal.   Neck: No tracheal deviation present. Thyromegaly (40 gm gland) present.   No palpable thyroid nodules     Cardiovascular: Normal rate, regular rhythm and normal heart sounds.   No murmur heard.  Pulmonary/Chest: Effort normal and breath sounds normal. No respiratory distress.   Abdominal: Soft. Bowel sounds are normal. There is no abdominal tenderness.   Lymphadenopathy:     She has no cervical adenopathy.   Neurological: She is alert and oriented to person, place, and time. No cranial nerve deficit.   No Tremor in hands   Skin: Skin is warm and dry. She is not diaphoretic. No erythema.   Psychiatric: Her behavior is normal.   Vitals reviewed.    LABS/IMAGING: outside records reviewed and summarized in HPI  Results for orders placed or performed in visit on 09/16/20   TSH    Specimen: Blood   Result Value Ref Range    TSH 0.997 0.270 - 4.200 uIU/mL   T4, Free    Specimen: Blood   Result Value Ref Range    Free T4 1.17 0.93 - 1.70 ng/dL     ASSESSMENT/PLAN:  1) Graves disease:  - pt clinically euthyroid on exam  - TFT's checked today WNL  - Decrease methimazole to 10 mg daily.     - pt to call if having side effect from medication or having any concerning symptoms  - Will check TFT's at next visit    RTC 4 months    Signed: " Vidya Sousa MD

## 2020-09-17 LAB
T4 FREE SERPL-MCNC: 1.17 NG/DL (ref 0.93–1.7)
TSH SERPL DL<=0.05 MIU/L-ACNC: 1 UIU/ML (ref 0.27–4.2)

## 2020-09-29 ENCOUNTER — TELEPHONE (OUTPATIENT)
Dept: ENDOCRINOLOGY | Facility: CLINIC | Age: 35
End: 2020-09-29

## 2020-09-29 RX ORDER — METHIMAZOLE 10 MG/1
10 TABLET ORAL DAILY
Qty: 90 TABLET | Refills: 1
Start: 2020-09-29 | End: 2021-02-19 | Stop reason: DRUGHIGH

## 2020-09-29 NOTE — TELEPHONE ENCOUNTER
Spoke to patient about lab results. See clinic note from 09/16/2020 for details.   Signed: Vidya Sousa MD

## 2021-02-03 ENCOUNTER — OFFICE VISIT (OUTPATIENT)
Dept: ENDOCRINOLOGY | Facility: CLINIC | Age: 36
End: 2021-02-03

## 2021-02-03 ENCOUNTER — LAB (OUTPATIENT)
Dept: LAB | Facility: HOSPITAL | Age: 36
End: 2021-02-03

## 2021-02-03 VITALS
DIASTOLIC BLOOD PRESSURE: 66 MMHG | WEIGHT: 146 LBS | OXYGEN SATURATION: 99 % | SYSTOLIC BLOOD PRESSURE: 118 MMHG | BODY MASS INDEX: 24.92 KG/M2 | HEART RATE: 67 BPM | HEIGHT: 64 IN

## 2021-02-03 DIAGNOSIS — E05.00 GRAVES' DISEASE: Primary | ICD-10-CM

## 2021-02-03 LAB
T3 SERPL-MCNC: 114 NG/DL (ref 80–200)
T4 FREE SERPL-MCNC: 1.31 NG/DL (ref 0.93–1.7)
TSH SERPL DL<=0.05 MIU/L-ACNC: 0.34 UIU/ML (ref 0.27–4.2)

## 2021-02-03 PROCEDURE — 84480 ASSAY TRIIODOTHYRONINE (T3): CPT | Performed by: INTERNAL MEDICINE

## 2021-02-03 PROCEDURE — 99213 OFFICE O/P EST LOW 20 MIN: CPT | Performed by: INTERNAL MEDICINE

## 2021-02-03 PROCEDURE — 84443 ASSAY THYROID STIM HORMONE: CPT | Performed by: INTERNAL MEDICINE

## 2021-02-03 PROCEDURE — 84439 ASSAY OF FREE THYROXINE: CPT | Performed by: INTERNAL MEDICINE

## 2021-02-03 PROCEDURE — 83520 IMMUNOASSAY QUANT NOS NONAB: CPT | Performed by: INTERNAL MEDICINE

## 2021-02-03 NOTE — PROGRESS NOTES
Chief Complaint   Patient presents with   • Graves' Disease     follow up     HPI:   Estela Angela is a 35 y.o.female who [facility] to [facility] for f/u evaluation of graves disease. Last visit 09/16/2020. Her history is as follows:    Interim Events:  - reports anxiety/stress has improved since quitting her job.   - Has IUD  - denies constipation, denies arthralgias, denies palpitations. Weight has been stable    1) Graves disease:   - first diagnosed in 2013, by Dr. Merritt. Has been on thionamide therapy intermittently (mainly methimazole) since 2013. Presented with palpitations, weight loss, and tremor.  - In 09/2016 had her first pregnancy (took PTU during that pregnancy). Postpartum was switched to methimazole.  - 18 months later, became pregnant with second child. Was on methimazole for a few weeks in the first trimester. Was switched to PTU. At 15 weeks, I switched to methimazole per current guidelines.   - pt stopped the methimazole post-partum in 02/07/2018, TSH Rec Ab <0.50 in 03/2018  - restarted methimazole 5 mg daily in 08/2018: TSH <0.004, free T4 2.92 (0.89 - 1.76), T3 316  - had to increase her dose in 02/2019  - Patient was scheduled for thyroidectomy on October 1, 2019 [facility] with Dr. Shari Ambriz.  However as the surgery date approached, she decided not to pursue thyroidectomy.  Continued methimazole therapy    Current Dose: methimazole 10 mg daily, on this dose since 09/2020  -Denies missed doses  -Denies fever, sore throat, or pruritus    Review of Systems   Constitutional: Negative.  Negative for fatigue.        Weight stable   HENT: Negative.    Eyes: Negative.    Respiratory: Positive for shortness of breath (Upon exertion).    Cardiovascular: Negative.  Negative for palpitations.   Gastrointestinal: Negative.  Negative for diarrhea.   Endocrine: Negative for heat intolerance.   Genitourinary: Negative.    Musculoskeletal: Negative.    Skin: Negative.    Allergic/Immunologic: Negative.   "  Neurological: Negative for tremors.   Hematological: Negative.    Psychiatric/Behavioral: Negative for sleep disturbance. The patient is not nervous/anxious.      The following portions of the patient's history were reviewed and updated as appropriate: allergies, current medications, past family history, past medical history, past social history, past surgical history and problem list.    /66   Pulse 67   Ht 162.6 cm (64\")   Wt 66.2 kg (146 lb)   SpO2 99%   BMI 25.06 kg/m²   Physical Exam   Constitutional: She is oriented to person, place, and time. She appears well-developed. No distress.   HENT:   Head: Normocephalic.   Eyes: Pupils are equal, round, and reactive to light. Conjunctivae are normal.   Neck: No tracheal deviation present. Thyromegaly (mild) present.   No palpable thyroid nodules     Cardiovascular: Normal rate, regular rhythm and normal heart sounds.   No murmur heard.  Pulmonary/Chest: Effort normal and breath sounds normal. No respiratory distress.   Abdominal: Soft. Bowel sounds are normal. There is no abdominal tenderness.   Lymphadenopathy:     She has no cervical adenopathy.   Neurological: She is alert and oriented to person, place, and time. No cranial nerve deficit.   No Tremor in hands   Skin: Skin is warm and dry. She is not diaphoretic. No erythema.   Psychiatric: Her behavior is normal.   Vitals reviewed.    LABS/IMAGING: outside records reviewed and summarized in HPI  Results for orders placed or performed in visit on 02/03/21   TSH    Specimen: Blood   Result Value Ref Range    TSH 0.343 0.270 - 4.200 uIU/mL   T4, Free    Specimen: Blood   Result Value Ref Range    Free T4 1.31 0.93 - 1.70 ng/dL   T3    Specimen: Blood   Result Value Ref Range    T3, Total 114.0 80.0 - 200.0 ng/dl   Thyrotropin Receptor Antibody    Specimen: Blood   Result Value Ref Range    Thyrotropin Receptor Antibody <1.10 0.00 - 1.75 IU/L     ASSESSMENT/PLAN:  1) Graves disease:  - pt clinically " euthyroid on exam  - TFT's checked today WNL, TSH Receptor Ab undetectable  - Decrease methimazole to 5 mg daily.     - pt to call if having side effect from medication or having any concerning symptoms  - Will check TFT's at next visit    RTC 5 months    Signed: Vidya Sousa MD

## 2021-02-05 LAB — TSH RECEP AB SER-ACNC: <1.1 IU/L (ref 0–1.75)

## 2021-02-19 DIAGNOSIS — E05.00 GRAVES' DISEASE: Primary | ICD-10-CM

## 2021-02-19 RX ORDER — METHIMAZOLE 5 MG/1
5 TABLET ORAL DAILY
Qty: 90 TABLET | Refills: 1 | Status: SHIPPED | OUTPATIENT
Start: 2021-02-19 | End: 2021-06-24 | Stop reason: SDUPTHER

## 2021-06-18 DIAGNOSIS — E05.00 GRAVES' DISEASE: Primary | ICD-10-CM

## 2021-06-24 ENCOUNTER — TELEPHONE (OUTPATIENT)
Dept: ENDOCRINOLOGY | Facility: CLINIC | Age: 36
End: 2021-06-24

## 2021-06-24 ENCOUNTER — LAB (OUTPATIENT)
Dept: LAB | Facility: HOSPITAL | Age: 36
End: 2021-06-24

## 2021-06-24 ENCOUNTER — LAB (OUTPATIENT)
Dept: ENDOCRINOLOGY | Facility: CLINIC | Age: 36
End: 2021-06-24

## 2021-06-24 DIAGNOSIS — E05.00 GRAVES' DISEASE: ICD-10-CM

## 2021-06-24 LAB
T4 FREE SERPL-MCNC: 4.7 NG/DL (ref 0.93–1.7)
TSH SERPL DL<=0.05 MIU/L-ACNC: <0.005 UIU/ML (ref 0.27–4.2)

## 2021-06-24 PROCEDURE — 84439 ASSAY OF FREE THYROXINE: CPT

## 2021-06-24 PROCEDURE — 84443 ASSAY THYROID STIM HORMONE: CPT

## 2021-06-24 RX ORDER — METHIMAZOLE 5 MG/1
10 TABLET ORAL DAILY
Qty: 180 TABLET | Refills: 1 | Status: SHIPPED | OUTPATIENT
Start: 2021-06-24 | End: 2021-07-31

## 2021-06-24 NOTE — TELEPHONE ENCOUNTER
Spoke to patient about lab results. Increased methimazole to 10 mg daily.  Signed: Vidya Sousa MD

## 2021-07-21 ENCOUNTER — LAB (OUTPATIENT)
Dept: LAB | Facility: HOSPITAL | Age: 36
End: 2021-07-21

## 2021-07-21 ENCOUNTER — OFFICE VISIT (OUTPATIENT)
Dept: ENDOCRINOLOGY | Facility: CLINIC | Age: 36
End: 2021-07-21

## 2021-07-21 VITALS
WEIGHT: 140 LBS | DIASTOLIC BLOOD PRESSURE: 62 MMHG | HEART RATE: 93 BPM | HEIGHT: 64 IN | OXYGEN SATURATION: 98 % | BODY MASS INDEX: 23.9 KG/M2 | SYSTOLIC BLOOD PRESSURE: 116 MMHG

## 2021-07-21 DIAGNOSIS — E05.00 GRAVES' DISEASE: Primary | ICD-10-CM

## 2021-07-21 LAB
T4 FREE SERPL-MCNC: 4.94 NG/DL (ref 0.93–1.7)
TSH SERPL DL<=0.05 MIU/L-ACNC: <0.005 UIU/ML (ref 0.27–4.2)

## 2021-07-21 PROCEDURE — 84439 ASSAY OF FREE THYROXINE: CPT | Performed by: INTERNAL MEDICINE

## 2021-07-21 PROCEDURE — 84443 ASSAY THYROID STIM HORMONE: CPT | Performed by: INTERNAL MEDICINE

## 2021-07-21 PROCEDURE — 99213 OFFICE O/P EST LOW 20 MIN: CPT | Performed by: INTERNAL MEDICINE

## 2021-07-21 NOTE — PROGRESS NOTES
Chief Complaint   Patient presents with   • Graves' Disease     HPI:   Estela Angela is a 35 y.o.female who returns to [facility] for f/u evaluation of graves disease. Last visit 02/03/2021. Her history is as follows:    Interim Events:  - reports less mood changes after dose increase in methimazole on 06/24/2021.  - Has IUD  - denies constipation, denies arthralgias, denies palpitations. Has had wt loss    1) Graves disease:   - first diagnosed in 2013, by Dr. Merritt. Has been on thionamide therapy intermittently (mainly methimazole) since 2013. Presented with palpitations, weight loss, and tremor.  - In 09/2016 had her first pregnancy (took PTU during that pregnancy). Postpartum was switched to methimazole.  - 18 months later, became pregnant with second child. Was on methimazole for a few weeks in the first trimester. Was switched to PTU. At 15 weeks, I switched to methimazole per current guidelines.   - pt stopped the methimazole post-partum in 02/07/2018, TSH Rec Ab <0.50 in 03/2018  - restarted methimazole 5 mg daily in 08/2018: TSH <0.004, free T4 2.92 (0.89 - 1.76), T3 316  - had to increase her dose in 02/2019  - Patient was scheduled for thyroidectomy on October 1, 2019 [facility] with Dr. Shari Ambriz.  However as the surgery date approached, she decided not to pursue thyroidectomy.  Continued methimazole therapy    Current Dose: methimazole 10 mg daily, on this dose since 06/2021  -Denies missed doses  -Denies fever, sore throat, or pruritus    Review of Systems   Constitutional: Negative.  Negative for fatigue.        Weight loss   HENT: Negative.    Eyes: Negative.    Respiratory: Positive for shortness of breath (Upon exertion).    Cardiovascular: Negative.  Negative for palpitations.   Gastrointestinal: Negative.  Negative for diarrhea.   Endocrine: Negative for heat intolerance.   Genitourinary: Negative.    Musculoskeletal: Negative.    Skin: Negative.    Allergic/Immunologic: Negative.   "  Neurological: Negative for tremors.   Hematological: Negative.    Psychiatric/Behavioral: Negative for sleep disturbance. The patient is not nervous/anxious.        The following portions of the patient's history were reviewed and updated as appropriate: allergies, current medications, past family history, past medical history, past social history, past surgical history and problem list.      /62   Pulse 93   Ht 162.6 cm (64\")   Wt 63.5 kg (140 lb)   SpO2 98%   BMI 24.03 kg/m²   Physical Exam   Constitutional: She is oriented to person, place, and time. She appears well-developed. No distress.   HENT:   Head: Normocephalic.   Eyes: Pupils are equal, round, and reactive to light. Conjunctivae are normal.   Neck: No tracheal deviation present. Thyromegaly (mild) present.   No palpable thyroid nodules     Cardiovascular: Normal rate, regular rhythm and normal heart sounds.   No murmur heard.  Pulmonary/Chest: Effort normal and breath sounds normal. No respiratory distress.   Abdominal: Soft. Bowel sounds are normal. There is no abdominal tenderness.   Lymphadenopathy:     She has no cervical adenopathy.   Neurological: She is alert and oriented to person, place, and time. No cranial nerve deficit.   No Tremor in hands   Skin: Skin is warm and dry. She is not diaphoretic. No erythema.   Psychiatric: Her behavior is normal.   Vitals reviewed.    LABS/IMAGING: outside records reviewed and summarized in HPI  Results for orders placed or performed in visit on 06/24/21   TSH    Specimen: Blood   Result Value Ref Range    TSH <0.005 (L) 0.270 - 4.200 uIU/mL   T4, Free    Specimen: Blood   Result Value Ref Range    Free T4 4.70 (H) 0.93 - 1.70 ng/dL     ASSESSMENT/PLAN:  1) Graves disease:  - TFT's show persistent hyperthyroidism on methimazole 10 mg daily  - Will increase dose to 20 mg daily  - pt to call if having side effect from medication or having any concerning symptoms  - Will check TFT's in 2 " months    RTC 5 months    Signed: Vidya Sousa MD

## 2021-07-31 RX ORDER — METHIMAZOLE 5 MG/1
20 TABLET ORAL DAILY
Qty: 120 TABLET | Refills: 1
Start: 2021-07-31 | End: 2021-10-12 | Stop reason: DRUGHIGH

## 2021-10-01 ENCOUNTER — LAB (OUTPATIENT)
Dept: ENDOCRINOLOGY | Facility: CLINIC | Age: 36
End: 2021-10-01

## 2021-10-01 ENCOUNTER — LAB (OUTPATIENT)
Dept: LAB | Facility: HOSPITAL | Age: 36
End: 2021-10-01

## 2021-10-01 DIAGNOSIS — E05.00 GRAVES' DISEASE: ICD-10-CM

## 2021-10-01 LAB
T4 FREE SERPL-MCNC: 3.38 NG/DL (ref 0.93–1.7)
TSH SERPL DL<=0.05 MIU/L-ACNC: 0.01 UIU/ML (ref 0.27–4.2)

## 2021-10-01 PROCEDURE — 84443 ASSAY THYROID STIM HORMONE: CPT

## 2021-10-01 PROCEDURE — 84439 ASSAY OF FREE THYROXINE: CPT

## 2021-10-12 DIAGNOSIS — E05.00 GRAVES' DISEASE: ICD-10-CM

## 2021-10-12 RX ORDER — METHIMAZOLE 10 MG/1
30 TABLET ORAL DAILY
Qty: 90 TABLET | Refills: 5 | Status: SHIPPED | OUTPATIENT
Start: 2021-10-12 | End: 2021-12-28

## 2021-12-05 DIAGNOSIS — E05.00 GRAVES' DISEASE: ICD-10-CM

## 2021-12-06 RX ORDER — METHIMAZOLE 5 MG/1
TABLET ORAL
Qty: 180 TABLET | Refills: 3 | OUTPATIENT
Start: 2021-12-06

## 2021-12-06 NOTE — TELEPHONE ENCOUNTER
Rx d/c'd. Not appropriate. Pt was prescribed methimazole 10 mg daily in 10/2021.   Signed: Vidya Sousa MD

## 2021-12-17 PROCEDURE — 87086 URINE CULTURE/COLONY COUNT: CPT | Performed by: PHYSICIAN ASSISTANT

## 2021-12-17 PROCEDURE — 87186 SC STD MICRODIL/AGAR DIL: CPT | Performed by: PHYSICIAN ASSISTANT

## 2021-12-27 ENCOUNTER — LAB (OUTPATIENT)
Dept: LAB | Facility: HOSPITAL | Age: 36
End: 2021-12-27

## 2021-12-27 ENCOUNTER — OFFICE VISIT (OUTPATIENT)
Dept: ENDOCRINOLOGY | Facility: CLINIC | Age: 36
End: 2021-12-27

## 2021-12-27 VITALS
BODY MASS INDEX: 25.78 KG/M2 | HEIGHT: 64 IN | DIASTOLIC BLOOD PRESSURE: 72 MMHG | HEART RATE: 67 BPM | SYSTOLIC BLOOD PRESSURE: 120 MMHG | OXYGEN SATURATION: 98 % | WEIGHT: 151 LBS

## 2021-12-27 DIAGNOSIS — E05.00 GRAVES' DISEASE: Primary | ICD-10-CM

## 2021-12-27 LAB
T4 FREE SERPL-MCNC: 1.03 NG/DL (ref 0.93–1.7)
TSH SERPL DL<=0.05 MIU/L-ACNC: <0.005 UIU/ML (ref 0.27–4.2)

## 2021-12-27 PROCEDURE — 99213 OFFICE O/P EST LOW 20 MIN: CPT | Performed by: INTERNAL MEDICINE

## 2021-12-27 PROCEDURE — 84439 ASSAY OF FREE THYROXINE: CPT | Performed by: INTERNAL MEDICINE

## 2021-12-27 PROCEDURE — 84443 ASSAY THYROID STIM HORMONE: CPT | Performed by: INTERNAL MEDICINE

## 2021-12-27 NOTE — PROGRESS NOTES
Chief Complaint   Patient presents with   • Graves' Disease     follow up     HPI:   Estela Angela is a 36 y.o.female who returns to [facility] for f/u evaluation of graves disease. Last visit 07/21/2021. Her history is as follows:    Interim Events:  - feeling better since increasing methimazole dose to 10 mg in 10/2021. Mild weight gain, palpitations and mood better.     1) Graves disease:   - first diagnosed in 2013, by Dr. Merritt. Has been on thionamide therapy intermittently (mainly methimazole) since 2013. Presented with palpitations, weight loss, and tremor.  - In 09/2016 had her first pregnancy (took PTU during that pregnancy). Postpartum was switched to methimazole.  - 18 months later, became pregnant with second child. Was on methimazole for a few weeks in the first trimester. Was switched to PTU. At 15 weeks, I switched to methimazole per [employer] guidelines.   - pt stopped the methimazole post-partum in 02/07/2018, TSH Rec Ab <0.50 in 03/2018  - restarted methimazole 5 mg daily in 08/2018: TSH <0.004, free T4 2.92 (0.89 - 1.76), T3 316  - had to increase her dose in 02/2019  - Patient was scheduled for thyroidectomy on October 1, 2019 [facility] with Dr. Shari Ambriz.  However as the surgery date approached, she decided not to pursue thyroidectomy.  Continued methimazole therapy      Current Dose: methimazole 30 mg daily, on this dose since 10/2021  -Denies missed doses  -Denies fever, sore throat, or pruritus    Review of Systems   Constitutional: Negative.  Negative for fatigue.        Weight gain   HENT: Negative.    Eyes: Negative.    Respiratory: Negative for shortness of breath.    Cardiovascular: Negative.  Negative for palpitations.   Gastrointestinal: Negative.  Negative for diarrhea.   Endocrine: Negative for heat intolerance.   Genitourinary: Negative.    Musculoskeletal: Negative.    Skin: Negative.    Allergic/Immunologic: Negative.    Neurological: Negative for tremors.   Hematological:  "Negative.    Psychiatric/Behavioral: Negative for sleep disturbance. The patient is not nervous/anxious.      The following portions of the patient's history were reviewed and updated as appropriate: allergies, current medications, past family history, past medical history, past social history, past surgical history and problem list.    /72   Pulse 67   Ht 162.6 cm (64\")   Wt 68.5 kg (151 lb)   SpO2 98%   BMI 25.92 kg/m²   Physical Exam   Constitutional: She is oriented to person, place, and time. She appears well-developed. No distress.   HENT:   Head: Normocephalic.   Eyes: Pupils are equal, round, and reactive to light. Conjunctivae are normal.   Neck: No tracheal deviation present. Thyromegaly (mild) present.   No palpable thyroid nodules     Cardiovascular: Normal rate, regular rhythm and normal heart sounds.   No murmur heard.  Pulmonary/Chest: Effort normal and breath sounds normal. No respiratory distress.   Abdominal: Soft. Bowel sounds are normal. There is no abdominal tenderness.   Lymphadenopathy:     She has no cervical adenopathy.   Neurological: She is alert and oriented to person, place, and time. No cranial nerve deficit.   No Tremor in hands   Skin: Skin is warm and dry. She is not diaphoretic. No erythema.   Psychiatric: Her behavior is normal.   Vitals reviewed.    LABS/IMAGING: outside records reviewed and summarized in HPI  Results for orders placed or performed in visit on 12/27/21   T4, Free    Specimen: Blood   Result Value Ref Range    Free T4 1.03 0.93 - 1.70 ng/dL   TSH    Specimen: Blood   Result Value Ref Range    TSH <0.005 (L) 0.270 - 4.200 uIU/mL     ASSESSMENT/PLAN:  1) Graves disease: clinically improving on methimazole  - TFT's checked today. Free T4 now in normal range. TSH suppressed.   - Will decrease methimazole to 20 mg daily. Will check TFT's in 2 months and adjust dose as indicated.  - pt to call if having side effect from medication or having any concerning " symptoms    RTC 5 months    Signed: Vidya Sousa MD

## 2021-12-28 RX ORDER — METHIMAZOLE 10 MG/1
20 TABLET ORAL DAILY
Qty: 60 TABLET | Refills: 1
Start: 2021-12-28 | End: 2022-03-11 | Stop reason: SDUPTHER

## 2022-02-25 ENCOUNTER — LAB (OUTPATIENT)
Dept: ENDOCRINOLOGY | Facility: CLINIC | Age: 37
End: 2022-02-25

## 2022-02-25 ENCOUNTER — LAB (OUTPATIENT)
Dept: LAB | Facility: HOSPITAL | Age: 37
End: 2022-02-25

## 2022-02-25 DIAGNOSIS — E05.00 GRAVES' DISEASE: ICD-10-CM

## 2022-02-25 LAB
T4 FREE SERPL-MCNC: 1.09 NG/DL (ref 0.93–1.7)
TSH SERPL DL<=0.05 MIU/L-ACNC: 0.11 UIU/ML (ref 0.27–4.2)

## 2022-02-25 PROCEDURE — 84443 ASSAY THYROID STIM HORMONE: CPT

## 2022-02-25 PROCEDURE — 84439 ASSAY OF FREE THYROXINE: CPT

## 2022-03-11 DIAGNOSIS — E05.00 GRAVES' DISEASE: ICD-10-CM

## 2022-03-11 RX ORDER — METHIMAZOLE 10 MG/1
20 TABLET ORAL DAILY
Qty: 60 TABLET | Refills: 3 | Status: SHIPPED | OUTPATIENT
Start: 2022-03-11 | End: 2022-06-24 | Stop reason: SDUPTHER

## 2022-06-08 ENCOUNTER — LAB (OUTPATIENT)
Dept: LAB | Facility: HOSPITAL | Age: 37
End: 2022-06-08

## 2022-06-08 ENCOUNTER — OFFICE VISIT (OUTPATIENT)
Dept: ENDOCRINOLOGY | Facility: CLINIC | Age: 37
End: 2022-06-08

## 2022-06-08 VITALS
SYSTOLIC BLOOD PRESSURE: 112 MMHG | OXYGEN SATURATION: 98 % | HEART RATE: 99 BPM | BODY MASS INDEX: 24.24 KG/M2 | HEIGHT: 64 IN | DIASTOLIC BLOOD PRESSURE: 68 MMHG | WEIGHT: 142 LBS

## 2022-06-08 DIAGNOSIS — E05.00 GRAVES' DISEASE: Primary | ICD-10-CM

## 2022-06-08 LAB
ALBUMIN SERPL-MCNC: 4.7 G/DL (ref 3.5–5.2)
ALBUMIN/GLOB SERPL: 2 G/DL
ALP SERPL-CCNC: 71 U/L (ref 39–117)
ALT SERPL W P-5'-P-CCNC: 32 U/L (ref 1–33)
ANION GAP SERPL CALCULATED.3IONS-SCNC: 12.3 MMOL/L (ref 5–15)
AST SERPL-CCNC: 27 U/L (ref 1–32)
BILIRUB SERPL-MCNC: 0.3 MG/DL (ref 0–1.2)
BUN SERPL-MCNC: 13 MG/DL (ref 6–20)
BUN/CREAT SERPL: 19.7 (ref 7–25)
CALCIUM SPEC-SCNC: 9.8 MG/DL (ref 8.6–10.5)
CHLORIDE SERPL-SCNC: 101 MMOL/L (ref 98–107)
CO2 SERPL-SCNC: 22.7 MMOL/L (ref 22–29)
CREAT SERPL-MCNC: 0.66 MG/DL (ref 0.57–1)
DEPRECATED RDW RBC AUTO: 38.3 FL (ref 37–54)
EGFRCR SERPLBLD CKD-EPI 2021: 117.5 ML/MIN/1.73
ERYTHROCYTE [DISTWIDTH] IN BLOOD BY AUTOMATED COUNT: 11.9 % (ref 12.3–15.4)
GLOBULIN UR ELPH-MCNC: 2.3 GM/DL
GLUCOSE SERPL-MCNC: 85 MG/DL (ref 65–99)
HCT VFR BLD AUTO: 38.7 % (ref 34–46.6)
HGB BLD-MCNC: 12.6 G/DL (ref 12–15.9)
MCH RBC QN AUTO: 29 PG (ref 26.6–33)
MCHC RBC AUTO-ENTMCNC: 32.6 G/DL (ref 31.5–35.7)
MCV RBC AUTO: 89 FL (ref 79–97)
PLATELET # BLD AUTO: 230 10*3/MM3 (ref 140–450)
PMV BLD AUTO: 12.2 FL (ref 6–12)
POTASSIUM SERPL-SCNC: 4.1 MMOL/L (ref 3.5–5.2)
PROT SERPL-MCNC: 7 G/DL (ref 6–8.5)
RBC # BLD AUTO: 4.35 10*6/MM3 (ref 3.77–5.28)
SODIUM SERPL-SCNC: 136 MMOL/L (ref 136–145)
T4 FREE SERPL-MCNC: 2.21 NG/DL (ref 0.93–1.7)
TSH SERPL DL<=0.05 MIU/L-ACNC: <0.005 UIU/ML (ref 0.27–4.2)
WBC NRBC COR # BLD: 9.83 10*3/MM3 (ref 3.4–10.8)

## 2022-06-08 PROCEDURE — 80053 COMPREHEN METABOLIC PANEL: CPT | Performed by: INTERNAL MEDICINE

## 2022-06-08 PROCEDURE — 85027 COMPLETE CBC AUTOMATED: CPT | Performed by: INTERNAL MEDICINE

## 2022-06-08 PROCEDURE — 84439 ASSAY OF FREE THYROXINE: CPT | Performed by: INTERNAL MEDICINE

## 2022-06-08 PROCEDURE — 99213 OFFICE O/P EST LOW 20 MIN: CPT | Performed by: INTERNAL MEDICINE

## 2022-06-08 PROCEDURE — 84443 ASSAY THYROID STIM HORMONE: CPT | Performed by: INTERNAL MEDICINE

## 2022-06-08 NOTE — PROGRESS NOTES
Chief Complaint   Patient presents with   • Graves' Disease     Follow up      HPI:   Estela Angela is a 36 y.o.female who returns to [facility] for f/u evaluation of graves disease. Last visit 12/27/2021. Her history is as follows:    Interim Events:  - pt decreased methimazole dose from 20 mg to 10 mg in May 2022 as directed.  - Reports overall feeling okay. Has had mild wt loss since last visit. Denies palpitations or tremor.     1) Graves disease:   - first diagnosed in 2013, by Dr. Merritt. Has been on thionamide therapy intermittently (mainly methimazole) since 2013. Presented with palpitations, weight loss, and tremor.  - In 09/2016 had her first pregnancy (took PTU during that pregnancy). Postpartum was switched to methimazole.  - 18 months later, became pregnant with second child. Was on methimazole for a few weeks in the first trimester. Was switched to PTU. At 15 weeks, I switched to methimazole per [employer] guidelines.   - pt stopped the methimazole post-partum in 02/07/2018, TSH Rec Ab <0.50 in 03/2018  - restarted methimazole 5 mg daily in 08/2018: TSH <0.004, free T4 2.92 (0.89 - 1.76), T3 316  - had to increase her dose in 02/2019  - Patient was scheduled for thyroidectomy on October 1, 2019 [facility] with Dr. Shari Ambriz.  However as the surgery date approached, she decided not to pursue thyroidectomy.  Continued methimazole therapy      Current Dose: methimazole 10 mg daily, on this dose since 05/2022  -Denies missed doses  -Denies fever, sore throat, or pruritus    Review of Systems   Constitutional: Negative.  Negative for fatigue.        Weight loss   HENT: Negative.    Eyes: Negative.    Respiratory: Negative for shortness of breath.    Cardiovascular: Negative.  Negative for palpitations.   Gastrointestinal: Negative.  Negative for diarrhea.   Endocrine: Negative for heat intolerance.   Genitourinary: Negative.    Musculoskeletal: Negative.    Skin: Negative.    Allergic/Immunologic: Negative.   "  Neurological: Negative for tremors.   Hematological: Negative.    Psychiatric/Behavioral: Negative for sleep disturbance. The patient is not nervous/anxious.      The following portions of the patient's history were reviewed and updated as appropriate: allergies, current medications, past family history, past medical history, past social history, past surgical history and problem list.    /68   Pulse 99   Ht 162.6 cm (64\")   Wt 64.4 kg (142 lb)   SpO2 98%   BMI 24.37 kg/m²   Physical Exam   Constitutional: She is oriented to person, place, and time. She appears well-developed. No distress.   HENT:   Head: Normocephalic.   Eyes: Pupils are equal, round, and reactive to light. Conjunctivae are normal.   Neck: No tracheal deviation present. Thyromegaly (mild) present.   No palpable thyroid nodules     Cardiovascular: Normal rate, regular rhythm and normal heart sounds.   No murmur heard.  Pulmonary/Chest: Effort normal and breath sounds normal. No respiratory distress.   Abdominal: Soft. Bowel sounds are normal. There is no abdominal tenderness.   Lymphadenopathy:     She has no cervical adenopathy.   Neurological: She is alert and oriented to person, place, and time. No cranial nerve deficit.   No Tremor in hands   Skin: Skin is warm and dry. She is not diaphoretic. No erythema.   Psychiatric: Her behavior is normal.   Vitals reviewed.    LABS/IMAGING: outside records reviewed and summarized in HPI  Results for orders placed or performed in visit on 06/08/22   T4, Free    Specimen: Blood   Result Value Ref Range    Free T4 2.21 (H) 0.93 - 1.70 ng/dL   TSH    Specimen: Blood   Result Value Ref Range    TSH <0.005 (L) 0.270 - 4.200 uIU/mL   CBC (No Diff)    Specimen: Blood   Result Value Ref Range    WBC 9.83 3.40 - 10.80 10*3/mm3    RBC 4.35 3.77 - 5.28 10*6/mm3    Hemoglobin 12.6 12.0 - 15.9 g/dL    Hematocrit 38.7 34.0 - 46.6 %    MCV 89.0 79.0 - 97.0 fL    MCH 29.0 26.6 - 33.0 pg    MCHC 32.6 31.5 - " 35.7 g/dL    RDW 11.9 (L) 12.3 - 15.4 %    RDW-SD 38.3 37.0 - 54.0 fl    MPV 12.2 (H) 6.0 - 12.0 fL    Platelets 230 140 - 450 10*3/mm3   Comprehensive Metabolic Panel    Specimen: Blood   Result Value Ref Range    Glucose 85 65 - 99 mg/dL    BUN 13 6 - 20 mg/dL    Creatinine 0.66 0.57 - 1.00 mg/dL    Sodium 136 136 - 145 mmol/L    Potassium 4.1 3.5 - 5.2 mmol/L    Chloride 101 98 - 107 mmol/L    CO2 22.7 22.0 - 29.0 mmol/L    Calcium 9.8 8.6 - 10.5 mg/dL    Total Protein 7.0 6.0 - 8.5 g/dL    Albumin 4.70 3.50 - 5.20 g/dL    ALT (SGPT) 32 1 - 33 U/L    AST (SGOT) 27 1 - 32 U/L    Alkaline Phosphatase 71 39 - 117 U/L    Total Bilirubin 0.3 0.0 - 1.2 mg/dL    Globulin 2.3 gm/dL    A/G Ratio 2.0 g/dL    BUN/Creatinine Ratio 19.7 7.0 - 25.0    Anion Gap 12.3 5.0 - 15.0 mmol/L    eGFR 117.5 >60.0 mL/min/1.73     ASSESSMENT/PLAN:  1) Graves disease: clinically stable on methimazole  - TFT's checked today show hyperthyroidism after dose reduction in May 2022  - Will increase methimazole to 20 mg daily. Will continue this dose until her f/u 5 months  - pt to call if having side effect from medication or having any concerning symptoms    RTC 5 months    Signed: Vidya Sousa MD

## 2022-06-24 RX ORDER — METHIMAZOLE 10 MG/1
20 TABLET ORAL DAILY
Qty: 180 TABLET | Refills: 1 | Status: SHIPPED | OUTPATIENT
Start: 2022-06-24 | End: 2022-09-16 | Stop reason: SDUPTHER

## 2022-08-03 ENCOUNTER — TELEPHONE (OUTPATIENT)
Dept: ENDOCRINOLOGY | Facility: CLINIC | Age: 37
End: 2022-08-03

## 2022-08-03 NOTE — TELEPHONE ENCOUNTER
Is she to complete labs before her appt or now? There are over due results and I'm confused  on when she is to complete them?

## 2022-09-14 ENCOUNTER — LAB (OUTPATIENT)
Dept: LAB | Facility: HOSPITAL | Age: 37
End: 2022-09-14

## 2022-09-14 DIAGNOSIS — E05.00 GRAVES' DISEASE: ICD-10-CM

## 2022-09-14 LAB
T4 FREE SERPL-MCNC: 2.07 NG/DL (ref 0.93–1.7)
TSH SERPL DL<=0.05 MIU/L-ACNC: <0.005 UIU/ML (ref 0.27–4.2)

## 2022-09-14 PROCEDURE — 84443 ASSAY THYROID STIM HORMONE: CPT

## 2022-09-14 PROCEDURE — 84439 ASSAY OF FREE THYROXINE: CPT

## 2022-09-16 DIAGNOSIS — E05.00 GRAVES' DISEASE: ICD-10-CM

## 2022-09-16 RX ORDER — METHIMAZOLE 10 MG/1
40 TABLET ORAL DAILY
Qty: 120 TABLET | Refills: 5 | Status: SHIPPED | OUTPATIENT
Start: 2022-09-16 | End: 2023-03-31 | Stop reason: DRUGHIGH

## 2022-11-14 ENCOUNTER — OFFICE VISIT (OUTPATIENT)
Dept: INTERNAL MEDICINE | Facility: CLINIC | Age: 37
End: 2022-11-14

## 2022-11-14 ENCOUNTER — LAB (OUTPATIENT)
Dept: LAB | Facility: HOSPITAL | Age: 37
End: 2022-11-14

## 2022-11-14 VITALS
HEART RATE: 77 BPM | OXYGEN SATURATION: 98 % | BODY MASS INDEX: 22.88 KG/M2 | TEMPERATURE: 97.1 F | DIASTOLIC BLOOD PRESSURE: 62 MMHG | SYSTOLIC BLOOD PRESSURE: 106 MMHG | WEIGHT: 134 LBS | HEIGHT: 64 IN

## 2022-11-14 DIAGNOSIS — Z00.00 HEALTHCARE MAINTENANCE: Primary | ICD-10-CM

## 2022-11-14 DIAGNOSIS — Z00.00 HEALTHCARE MAINTENANCE: ICD-10-CM

## 2022-11-14 DIAGNOSIS — Z23 NEED FOR VACCINATION: ICD-10-CM

## 2022-11-14 LAB
CHOLEST SERPL-MCNC: 239 MG/DL (ref 0–200)
HBA1C MFR BLD: 5.5 % (ref 4.8–5.6)
HCV AB SER DONR QL: NORMAL
HDLC SERPL-MCNC: 58 MG/DL (ref 40–60)
LDLC SERPL CALC-MCNC: 171 MG/DL (ref 0–100)
LDLC/HDLC SERPL: 2.91 {RATIO}
TRIGL SERPL-MCNC: 62 MG/DL (ref 0–150)
VLDLC SERPL-MCNC: 10 MG/DL (ref 5–40)

## 2022-11-14 PROCEDURE — 3008F BODY MASS INDEX DOCD: CPT | Performed by: INTERNAL MEDICINE

## 2022-11-14 PROCEDURE — 86803 HEPATITIS C AB TEST: CPT | Performed by: INTERNAL MEDICINE

## 2022-11-14 PROCEDURE — 80061 LIPID PANEL: CPT | Performed by: INTERNAL MEDICINE

## 2022-11-14 PROCEDURE — 99385 PREV VISIT NEW AGE 18-39: CPT | Performed by: INTERNAL MEDICINE

## 2022-11-14 PROCEDURE — 90715 TDAP VACCINE 7 YRS/> IM: CPT | Performed by: INTERNAL MEDICINE

## 2022-11-14 PROCEDURE — 2014F MENTAL STATUS ASSESS: CPT | Performed by: INTERNAL MEDICINE

## 2022-11-14 PROCEDURE — 90471 IMMUNIZATION ADMIN: CPT | Performed by: INTERNAL MEDICINE

## 2022-11-14 PROCEDURE — 83036 HEMOGLOBIN GLYCOSYLATED A1C: CPT | Performed by: INTERNAL MEDICINE

## 2022-11-14 NOTE — PROGRESS NOTES
Female Physical Note      Date: 2022   Patient Name: Estela Angela  : 1985   MRN: [id number]     Chief Complaint:    Chief Complaint   Patient presents with   • Annual Exam     Physical   • Diabetes     Her mother is diabetic and wants to discuss getting a glucose monitor.   • Establish Care     New patient       History of Present Illness: Estela Angela is a 37 y.o. female who is here today for their annual health maintenance and physical. Her mother is diabetic and the patient would like a glucose monitor in order to determine how foods affect her blood glucose. She is agreeable to being tested for diabetes on a yearly basis or more often if she is prediabetic.    Her last Pap smear was in 2022 by Dr. Baxter at  [facility] and was normal.   No family history of breast cancer or colon cancer.  Her Kyleena was removed and she has a Natural cycles yenny she is using, which reminds her to do breast exams. They are currently using condoms for birth control.     She has a history of Grave's disease followed by Dr. Sousa in endocrinology.      The patient is due for a hepatitis C screen.     She has not had the influenza vaccine, and does not generally get it. The patient has had 2 COVID-19 vaccines but declines the bivalent booster. She is agreeable to getting the Tdap today.     She has seen both the eye doctor (once a year) and her dentist (twice a year).    The patient does not see a dermatologist regularly.    She does not smoke. Patient denies drug use. She and her  quit drinking in 2022 because she felt they were drinking too much and to help better her communicate better.       Subjective      Review of Systems:   Review of Systems   HENT: Negative for dental problem.    Eyes: Negative for visual disturbance.   Genitourinary: Negative for menstrual problem.   Neurological: Negative for weakness.   Psychiatric/Behavioral: Negative for behavioral problems.       Past Medical  "History, Social History, Family History and Care Team were all reviewed with patient and updated as appropriate.     Medications:     Current Outpatient Medications:   •  methIMAzole (TAPAZOLE) 10 MG tablet, Take 4 tablets by mouth Daily., Disp: 120 tablet, Rfl: 5    Allergies:   No Known Allergies    Immunization History   Administered Date(s) Administered   • COVID-19 (MODERNA) 1st, 2nd, 3rd Dose Only 05/25/2021, 06/22/2021   • Flu Vaccine Quad PF >36MO 09/13/2017   • HPV Quadrivalent 11/28/2011, 02/27/2012   • Tdap 11/14/2022         Colorectal Screening:     Last Completed Colonoscopy     This patient has no relevant Health Maintenance data.        Pap:    Last Completed Pap Smear     This patient has no relevant Health Maintenance data.        Mammogram:    Last Completed Mammogram     This patient has no relevant Health Maintenance data.        PHQ-2 Depression Screening:  PHQ-9 Total Score: 0       Objective     Physical Exam:  Vital Signs:   Vitals:    11/14/22 0909   BP: 106/62   BP Location: Right arm   Patient Position: Sitting   Pulse: 77   Temp: 97.1 °F (36.2 °C)   TempSrc: Infrared   SpO2: 98%   Weight: 60.8 kg (134 lb)   Height: 161.5 cm (63.6\")     Body mass index is 23.29 kg/m².   BMI is within normal parameters. No other follow-up for BMI required.       Physical Exam  Vitals and nursing note reviewed.   Constitutional:       General: She is not in acute distress.     Appearance: Normal appearance. She is well-developed, well-groomed and normal weight. She is not ill-appearing or toxic-appearing.   HENT:      Head: Normocephalic and atraumatic.      Right Ear: Tympanic membrane, ear canal and external ear normal. There is no impacted cerumen.      Left Ear: Tympanic membrane, ear canal and external ear normal. There is no impacted cerumen.      Nose: Nose normal.      Mouth/Throat:      Mouth: Mucous membranes are moist.      Pharynx: Oropharynx is clear.   Eyes:      Extraocular Movements: " Extraocular movements intact.      Conjunctiva/sclera: Conjunctivae normal.      Pupils: Pupils are equal, round, and reactive to light.   Cardiovascular:      Rate and Rhythm: Normal rate and regular rhythm.      Heart sounds: Normal heart sounds. No murmur heard.    No friction rub. No gallop.   Pulmonary:      Effort: Pulmonary effort is normal. No respiratory distress.      Breath sounds: Normal breath sounds. No wheezing or rhonchi.   Abdominal:      General: Bowel sounds are normal. There is no distension.      Palpations: Abdomen is soft. There is no mass.      Tenderness: There is no abdominal tenderness. There is no guarding or rebound.   Musculoskeletal:      Cervical back: Normal range of motion and neck supple.      Comments: Able to get on/off exam table w/o assistance; MAEW   Lymphadenopathy:      Cervical: No cervical adenopathy.   Skin:     General: Skin is warm and dry.   Neurological:      General: No focal deficit present.      Mental Status: She is alert and oriented to person, place, and time. Mental status is at baseline.      Motor: No weakness.      Coordination: Coordination normal.      Gait: Gait normal.      Deep Tendon Reflexes: Reflexes normal.   Psychiatric:         Attention and Perception: Attention normal.         Mood and Affect: Mood normal.         Behavior: Behavior normal. Behavior is cooperative.         Thought Content: Thought content normal.         Judgment: Judgment normal.         Procedures    Assessment / Plan      Assessment/Plan:   Diagnoses and all orders for this visit:      1. Preventive Care/healthcare maintenance/annual exam  - UTD on Pap smear and will continue to see gynecology. Patient will have a copy of her records sent here.  - Mammograms will begin at age 40, unless there is a change in family history of or she has abnormal findings on breast exams.   - Colon screening will begin at age 45, unless there is a change family history or change in her health  condition.   - Laboratory studies will be checked today        -     Hemoglobin A1c; Future  -     Lipid Panel; Future        -     Hepatitis C Antibody; Future  - She declined the COVID-19 bivalent booster and the influenza vaccine today.   - Agrees to Tdap today.    2. Grave's disease   -  Continue to follow with endocrinology    3. Family history of diabetes.   -  Check an A1c today.    4. Need for vaccination  -  Tdap Vaccine Greater Than or Equal To 8yo IM      Follow Up:   In 1 year, sooner if needed.     Healthcare Maintenance:   Counseling provided on cancer screening (cervical, breast and colon), immunizations, screening labs, and other information noted above.  Estela Angela voices understanding and acceptance of this advice and will call back with any further questions or concerns. AVS with preventive healthcare tips printed for patient.     I have spent a total of 40 min on reviewing test results/preparing to see patient, counseling patient, performing medically appropriate exam and documenting clinical information in the electronic or other health record     Gisela Stantno MD. [facility]       Transcribed from ambient dictation for Gisela Stanton MD by Swati Alvarado.  11/14/22   10:58 EST    Patient or patient representative verbalized consent to the visit recording.  I have personally performed the services described in this document as transcribed by the above individual, and it is both accurate and complete.

## 2022-11-16 NOTE — PROGRESS NOTES
Ms. Angela,    Your labs checked on 11/14/22 are normal except your cholesterol is high. This may be due to your diet or genetics (inherited from parents) or both. I recommend a diet with lean meats, fruits, vegetables and low in sugars/fats. If you would like to discuss in more detail, please call the clinic and make an appointment. We will recheck your cholesterol next year at your annual physical exam.     Thank you,   Dr. Stanton

## 2022-12-21 ENCOUNTER — LAB (OUTPATIENT)
Dept: LAB | Facility: HOSPITAL | Age: 37
End: 2022-12-21

## 2022-12-21 ENCOUNTER — OFFICE VISIT (OUTPATIENT)
Dept: ENDOCRINOLOGY | Facility: CLINIC | Age: 37
End: 2022-12-21

## 2022-12-21 VITALS
HEIGHT: 63 IN | DIASTOLIC BLOOD PRESSURE: 68 MMHG | WEIGHT: 128 LBS | HEART RATE: 80 BPM | SYSTOLIC BLOOD PRESSURE: 116 MMHG | OXYGEN SATURATION: 98 % | BODY MASS INDEX: 22.68 KG/M2

## 2022-12-21 DIAGNOSIS — E05.00 GRAVES' DISEASE: Primary | ICD-10-CM

## 2022-12-21 LAB
DEPRECATED RDW RBC AUTO: 39.2 FL (ref 37–54)
ERYTHROCYTE [DISTWIDTH] IN BLOOD BY AUTOMATED COUNT: 12.9 % (ref 12.3–15.4)
HCT VFR BLD AUTO: 36.3 % (ref 34–46.6)
HGB BLD-MCNC: 12.1 G/DL (ref 12–15.9)
MCH RBC QN AUTO: 27.7 PG (ref 26.6–33)
MCHC RBC AUTO-ENTMCNC: 33.3 G/DL (ref 31.5–35.7)
MCV RBC AUTO: 83.1 FL (ref 79–97)
PLATELET # BLD AUTO: 309 10*3/MM3 (ref 140–450)
PMV BLD AUTO: 11.4 FL (ref 6–12)
RBC # BLD AUTO: 4.37 10*6/MM3 (ref 3.77–5.28)
WBC NRBC COR # BLD: 5.51 10*3/MM3 (ref 3.4–10.8)

## 2022-12-21 PROCEDURE — 84439 ASSAY OF FREE THYROXINE: CPT | Performed by: INTERNAL MEDICINE

## 2022-12-21 PROCEDURE — 80053 COMPREHEN METABOLIC PANEL: CPT | Performed by: INTERNAL MEDICINE

## 2022-12-21 PROCEDURE — 85027 COMPLETE CBC AUTOMATED: CPT | Performed by: INTERNAL MEDICINE

## 2022-12-21 PROCEDURE — 84443 ASSAY THYROID STIM HORMONE: CPT | Performed by: INTERNAL MEDICINE

## 2022-12-21 PROCEDURE — 99213 OFFICE O/P EST LOW 20 MIN: CPT | Performed by: INTERNAL MEDICINE

## 2022-12-21 NOTE — PROGRESS NOTES
Chief Complaint   Patient presents with   • Graves' Disease     Follow up      HPI:   Estela Angela is a 37 y.o.female who returns to Endocrine Clinic for f/u evaluation of graves disease. Last visit 06/08/2022. Her history is as follows:    Interim Events:  - pt had the flu 2 weeks ago. Better now. Had n/v during illness.   - States she has been trying to lose weight intentionally    1) Graves disease:   - first diagnosed in 2013, by Dr. Merritt. Has been on thionamide therapy intermittently (mainly methimazole) since 2013. Presented with palpitations, weight loss, and tremor.  - In 09/2016 had her first pregnancy (took PTU during that pregnancy). Postpartum was switched to methimazole.  - 18 months later, became pregnant with second child. Was on methimazole for a few weeks in the first trimester. Was switched to PTU. At 15 weeks, I switched to methimazole per TAHIRA guidelines.   - pt stopped the methimazole post-partum in 02/07/2018, TSH Rec Ab <0.50 in 03/2018  - restarted methimazole 5 mg daily in 08/2018: TSH <0.004, free T4 2.92 (0.89 - 1.76), T3 316  - had to increase her dose in 02/2019  - Patient was scheduled for thyroidectomy on October 1, 2019 [facility] with Dr. Shari Ambriz.  However as the surgery date approached, she decided not to pursue thyroidectomy.  Continued methimazole therapy      Current Dose: methimazole 40 mg daily, on this dose since 09/2022. Pt states she has been missing doses.  -Denies missed doses  -Denies fever, sore throat, or pruritus    - Denies tremor, palpitations, diarrhea, rash/itching    Review of Systems   Constitutional: Negative.  Negative for fatigue.        Weight loss   HENT: Negative.    Eyes: Negative.    Respiratory: Negative for shortness of breath.    Cardiovascular: Negative.  Negative for palpitations.   Gastrointestinal: Negative.  Negative for diarrhea.   Endocrine: Negative for heat intolerance.   Genitourinary: Negative.    Musculoskeletal: Negative.    Skin:  "Negative.    Allergic/Immunologic: Negative.    Neurological: Negative for tremors.   Hematological: Negative.    Psychiatric/Behavioral: Negative for sleep disturbance. The patient is not nervous/anxious.        The following portions of the patient's history were reviewed and updated as appropriate: allergies, current medications, past family history, past medical history, past social history, past surgical history and problem list.      /68   Pulse 80   Ht 160 cm (63\")   Wt 58.1 kg (128 lb)   SpO2 98%   BMI 22.67 kg/m²   Physical Exam   Constitutional: She is oriented to person, place, and time. She appears well-developed. No distress.   HENT:   Head: Normocephalic.   Eyes: Pupils are equal, round, and reactive to light. Conjunctivae are normal.   Neck: No tracheal deviation present. Thyromegaly (mild) present.   No palpable thyroid nodules     Cardiovascular: Normal rate, regular rhythm and normal heart sounds.   No murmur heard.  Pulmonary/Chest: Effort normal and breath sounds normal. No respiratory distress.   Abdominal: Soft. Bowel sounds are normal. There is no abdominal tenderness.   Lymphadenopathy:     She has no cervical adenopathy.   Neurological: She is alert and oriented to person, place, and time. No cranial nerve deficit.   No Tremor in hands   Skin: Skin is warm and dry. She is not diaphoretic. No erythema.   Psychiatric: Her behavior is normal.   Vitals reviewed.    LABS/IMAGING: outside records reviewed and summarized in HPI  Results for orders placed or performed in visit on 12/21/22   TSH    Specimen: Blood   Result Value Ref Range    TSH <0.005 (L) 0.270 - 4.200 uIU/mL   T4, Free    Specimen: Blood   Result Value Ref Range    Free T4 2.41 (H) 0.93 - 1.70 ng/dL   CBC (No Diff)    Specimen: Blood   Result Value Ref Range    WBC 5.51 3.40 - 10.80 10*3/mm3    RBC 4.37 3.77 - 5.28 10*6/mm3    Hemoglobin 12.1 12.0 - 15.9 g/dL    Hematocrit 36.3 34.0 - 46.6 %    MCV 83.1 79.0 - 97.0 fL    " MCH 27.7 26.6 - 33.0 pg    MCHC 33.3 31.5 - 35.7 g/dL    RDW 12.9 12.3 - 15.4 %    RDW-SD 39.2 37.0 - 54.0 fl    MPV 11.4 6.0 - 12.0 fL    Platelets 309 140 - 450 10*3/mm3   Comprehensive Metabolic Panel    Specimen: Blood   Result Value Ref Range    Glucose 88 65 - 99 mg/dL    BUN 12 6 - 20 mg/dL    Creatinine 0.59 0.57 - 1.00 mg/dL    Sodium 139 136 - 145 mmol/L    Potassium 4.2 3.5 - 5.2 mmol/L    Chloride 105 98 - 107 mmol/L    CO2 23.9 22.0 - 29.0 mmol/L    Calcium 9.5 8.6 - 10.5 mg/dL    Total Protein 7.0 6.0 - 8.5 g/dL    Albumin 4.30 3.50 - 5.20 g/dL    ALT (SGPT) 13 1 - 33 U/L    AST (SGOT) 11 1 - 32 U/L    Alkaline Phosphatase 66 39 - 117 U/L    Total Bilirubin 0.3 0.0 - 1.2 mg/dL    Globulin 2.7 gm/dL    A/G Ratio 1.6 g/dL    BUN/Creatinine Ratio 20.3 7.0 - 25.0    Anion Gap 10.1 5.0 - 15.0 mmol/L    eGFR 120.0 >60.0 mL/min/1.73     ASSESSMENT/PLAN:  1) Graves disease: uncontrolled  - With symptom of wt loss only at this time.  - TFT's checked today show continued hyperthyroidism. This is partially due to missed doses of methimazole and recent URI.  - Will continue methimazole 40 mg daily. Encouraged pt to take daily.  - Will check TFT's in 2 months on 02/24/2023 and adjust dose as indicated  - pt to call if having side effect from medication or having any concerning symptoms    RTC 6 months    Signed: Vidya Sousa MD

## 2022-12-22 LAB
ALBUMIN SERPL-MCNC: 4.3 G/DL (ref 3.5–5.2)
ALBUMIN/GLOB SERPL: 1.6 G/DL
ALP SERPL-CCNC: 66 U/L (ref 39–117)
ALT SERPL W P-5'-P-CCNC: 13 U/L (ref 1–33)
ANION GAP SERPL CALCULATED.3IONS-SCNC: 10.1 MMOL/L (ref 5–15)
AST SERPL-CCNC: 11 U/L (ref 1–32)
BILIRUB SERPL-MCNC: 0.3 MG/DL (ref 0–1.2)
BUN SERPL-MCNC: 12 MG/DL (ref 6–20)
BUN/CREAT SERPL: 20.3 (ref 7–25)
CALCIUM SPEC-SCNC: 9.5 MG/DL (ref 8.6–10.5)
CHLORIDE SERPL-SCNC: 105 MMOL/L (ref 98–107)
CO2 SERPL-SCNC: 23.9 MMOL/L (ref 22–29)
CREAT SERPL-MCNC: 0.59 MG/DL (ref 0.57–1)
EGFRCR SERPLBLD CKD-EPI 2021: 120 ML/MIN/1.73
GLOBULIN UR ELPH-MCNC: 2.7 GM/DL
GLUCOSE SERPL-MCNC: 88 MG/DL (ref 65–99)
POTASSIUM SERPL-SCNC: 4.2 MMOL/L (ref 3.5–5.2)
PROT SERPL-MCNC: 7 G/DL (ref 6–8.5)
SODIUM SERPL-SCNC: 139 MMOL/L (ref 136–145)
T4 FREE SERPL-MCNC: 2.41 NG/DL (ref 0.93–1.7)
TSH SERPL DL<=0.05 MIU/L-ACNC: <0.005 UIU/ML (ref 0.27–4.2)

## 2023-02-16 PROCEDURE — 87081 CULTURE SCREEN ONLY: CPT | Performed by: NURSE PRACTITIONER

## 2023-03-28 ENCOUNTER — LAB (OUTPATIENT)
Dept: LAB | Facility: HOSPITAL | Age: 38
End: 2023-03-28
Payer: COMMERCIAL

## 2023-03-28 ENCOUNTER — TRANSCRIBE ORDERS (OUTPATIENT)
Dept: LAB | Facility: HOSPITAL | Age: 38
End: 2023-03-28
Payer: COMMERCIAL

## 2023-03-28 DIAGNOSIS — E05.00 GRAVES' DISEASE: ICD-10-CM

## 2023-03-28 DIAGNOSIS — N92.6 IRREGULAR MENSTRUAL CYCLE: Primary | ICD-10-CM

## 2023-03-28 DIAGNOSIS — N92.6 IRREGULAR MENSTRUAL CYCLE: ICD-10-CM

## 2023-03-28 LAB — HCG INTACT+B SERPL-ACNC: 2979 MIU/ML

## 2023-03-28 PROCEDURE — 36415 COLL VENOUS BLD VENIPUNCTURE: CPT

## 2023-03-28 PROCEDURE — 84439 ASSAY OF FREE THYROXINE: CPT

## 2023-03-28 PROCEDURE — 84144 ASSAY OF PROGESTERONE: CPT

## 2023-03-28 PROCEDURE — 84443 ASSAY THYROID STIM HORMONE: CPT

## 2023-03-28 PROCEDURE — 84702 CHORIONIC GONADOTROPIN TEST: CPT

## 2023-03-29 LAB
PROGEST SERPL-MCNC: 19.5 NG/ML
T4 FREE SERPL-MCNC: 1.32 NG/DL (ref 0.93–1.7)
TSH SERPL DL<=0.05 MIU/L-ACNC: 0.02 UIU/ML (ref 0.27–4.2)

## 2023-03-30 ENCOUNTER — LAB (OUTPATIENT)
Dept: LAB | Facility: HOSPITAL | Age: 38
End: 2023-03-30
Payer: COMMERCIAL

## 2023-03-30 ENCOUNTER — TRANSCRIBE ORDERS (OUTPATIENT)
Dept: LAB | Facility: HOSPITAL | Age: 38
End: 2023-03-30
Payer: COMMERCIAL

## 2023-03-30 DIAGNOSIS — N92.6 IRREGULAR MENSTRUAL CYCLE: ICD-10-CM

## 2023-03-30 DIAGNOSIS — N92.6 IRREGULAR MENSTRUAL CYCLE: Primary | ICD-10-CM

## 2023-03-30 PROCEDURE — 36415 COLL VENOUS BLD VENIPUNCTURE: CPT

## 2023-03-30 PROCEDURE — 84702 CHORIONIC GONADOTROPIN TEST: CPT

## 2023-03-31 DIAGNOSIS — O99.280 HYPERTHYROIDISM DURING PREGNANCY: Primary | ICD-10-CM

## 2023-03-31 DIAGNOSIS — E05.00 GRAVES' DISEASE: ICD-10-CM

## 2023-03-31 DIAGNOSIS — E05.90 HYPERTHYROIDISM DURING PREGNANCY: Primary | ICD-10-CM

## 2023-03-31 LAB — HCG INTACT+B SERPL-ACNC: 6849 MIU/ML

## 2023-03-31 NOTE — PROGRESS NOTES
Spoke to patient about lab results. Pt is currently 6 weeks pregnant. Advised her to stop methimazole. Will check TFT's and Graves Abs levels in 4 weeks.   Signed: Vidya Sousa MD

## 2023-04-28 ENCOUNTER — LAB (OUTPATIENT)
Dept: ENDOCRINOLOGY | Facility: CLINIC | Age: 38
End: 2023-04-28
Payer: COMMERCIAL

## 2023-04-28 DIAGNOSIS — O99.280 HYPERTHYROIDISM DURING PREGNANCY: ICD-10-CM

## 2023-04-28 DIAGNOSIS — E05.00 GRAVES' DISEASE: ICD-10-CM

## 2023-04-28 DIAGNOSIS — E05.90 HYPERTHYROIDISM DURING PREGNANCY: ICD-10-CM

## 2023-04-28 LAB
T4 FREE SERPL-MCNC: 2.82 NG/DL (ref 0.93–1.7)
TSH SERPL DL<=0.05 MIU/L-ACNC: <0.005 UIU/ML (ref 0.27–4.2)

## 2023-04-28 PROCEDURE — 84445 ASSAY OF TSI GLOBULIN: CPT | Performed by: INTERNAL MEDICINE

## 2023-04-28 PROCEDURE — 84439 ASSAY OF FREE THYROXINE: CPT | Performed by: INTERNAL MEDICINE

## 2023-04-28 PROCEDURE — 84443 ASSAY THYROID STIM HORMONE: CPT | Performed by: INTERNAL MEDICINE

## 2023-04-28 PROCEDURE — 83520 IMMUNOASSAY QUANT NOS NONAB: CPT | Performed by: INTERNAL MEDICINE

## 2023-04-30 LAB — TSI SER-ACNC: 0.51 IU/L (ref 0–0.55)

## 2023-05-01 LAB — TSH RECEP AB SER-ACNC: 2 IU/L (ref 0–1.75)

## 2023-05-05 DIAGNOSIS — E05.90 HYPERTHYROIDISM DURING PREGNANCY: Primary | ICD-10-CM

## 2023-05-05 DIAGNOSIS — E05.00 GRAVES' DISEASE: ICD-10-CM

## 2023-05-05 DIAGNOSIS — O99.280 HYPERTHYROIDISM DURING PREGNANCY: Primary | ICD-10-CM

## 2023-05-17 ENCOUNTER — TRANSCRIBE ORDERS (OUTPATIENT)
Dept: LAB | Facility: HOSPITAL | Age: 38
End: 2023-05-17
Payer: COMMERCIAL

## 2023-05-17 ENCOUNTER — LAB (OUTPATIENT)
Dept: LAB | Facility: HOSPITAL | Age: 38
End: 2023-05-17
Payer: COMMERCIAL

## 2023-05-17 DIAGNOSIS — Z3A.12 12 WEEKS GESTATION OF PREGNANCY: Primary | ICD-10-CM

## 2023-05-17 DIAGNOSIS — Z3A.12 12 WEEKS GESTATION OF PREGNANCY: ICD-10-CM

## 2023-05-17 LAB
ABO GROUP BLD: NORMAL
BACTERIA UR QL AUTO: ABNORMAL /HPF
BASOPHILS # BLD AUTO: 0.01 10*3/MM3 (ref 0–0.2)
BASOPHILS NFR BLD AUTO: 0.2 % (ref 0–1.5)
BILIRUB UR QL STRIP: NEGATIVE
CLARITY UR: CLEAR
COD CRY URNS QL: ABNORMAL /HPF
COLOR UR: YELLOW
DEPRECATED RDW RBC AUTO: 40.3 FL (ref 37–54)
EOSINOPHIL # BLD AUTO: 0.12 10*3/MM3 (ref 0–0.4)
EOSINOPHIL NFR BLD AUTO: 1.9 % (ref 0.3–6.2)
ERYTHROCYTE [DISTWIDTH] IN BLOOD BY AUTOMATED COUNT: 12.9 % (ref 12.3–15.4)
GLUCOSE UR STRIP-MCNC: NEGATIVE MG/DL
HBV SURFACE AG SERPL QL IA: NORMAL
HCT VFR BLD AUTO: 34.7 % (ref 34–46.6)
HGB BLD-MCNC: 11.8 G/DL (ref 12–15.9)
HGB UR QL STRIP.AUTO: NEGATIVE
HIV1+2 AB SER QL: NORMAL
HYALINE CASTS UR QL AUTO: ABNORMAL /LPF
IMM GRANULOCYTES # BLD AUTO: 0.01 10*3/MM3 (ref 0–0.05)
IMM GRANULOCYTES NFR BLD AUTO: 0.2 % (ref 0–0.5)
KETONES UR QL STRIP: NEGATIVE
LEUKOCYTE ESTERASE UR QL STRIP.AUTO: NEGATIVE
LYMPHOCYTES # BLD AUTO: 1.75 10*3/MM3 (ref 0.7–3.1)
LYMPHOCYTES NFR BLD AUTO: 27.7 % (ref 19.6–45.3)
MCH RBC QN AUTO: 29.6 PG (ref 26.6–33)
MCHC RBC AUTO-ENTMCNC: 34 G/DL (ref 31.5–35.7)
MCV RBC AUTO: 87 FL (ref 79–97)
MONOCYTES # BLD AUTO: 0.35 10*3/MM3 (ref 0.1–0.9)
MONOCYTES NFR BLD AUTO: 5.5 % (ref 5–12)
NEUTROPHILS NFR BLD AUTO: 4.07 10*3/MM3 (ref 1.7–7)
NEUTROPHILS NFR BLD AUTO: 64.5 % (ref 42.7–76)
NITRITE UR QL STRIP: NEGATIVE
NRBC BLD AUTO-RTO: 0 /100 WBC (ref 0–0.2)
PH UR STRIP.AUTO: 6 [PH] (ref 5–8)
PLATELET # BLD AUTO: 225 10*3/MM3 (ref 140–450)
PMV BLD AUTO: 12.3 FL (ref 6–12)
PROT UR QL STRIP: NEGATIVE
RBC # BLD AUTO: 3.99 10*6/MM3 (ref 3.77–5.28)
RBC # UR STRIP: ABNORMAL /HPF
REF LAB TEST METHOD: ABNORMAL
RH BLD: POSITIVE
SP GR UR STRIP: 1.02 (ref 1–1.03)
SQUAMOUS #/AREA URNS HPF: ABNORMAL /HPF
UROBILINOGEN UR QL STRIP: NORMAL
WBC # UR STRIP: ABNORMAL /HPF
WBC NRBC COR # BLD: 6.31 10*3/MM3 (ref 3.4–10.8)

## 2023-05-17 PROCEDURE — 81001 URINALYSIS AUTO W/SCOPE: CPT

## 2023-05-17 PROCEDURE — 87340 HEPATITIS B SURFACE AG IA: CPT

## 2023-05-17 PROCEDURE — 80307 DRUG TEST PRSMV CHEM ANLYZR: CPT

## 2023-05-17 PROCEDURE — 87661 TRICHOMONAS VAGINALIS AMPLIF: CPT

## 2023-05-17 PROCEDURE — 87591 N.GONORRHOEAE DNA AMP PROB: CPT

## 2023-05-17 PROCEDURE — 86780 TREPONEMA PALLIDUM: CPT

## 2023-05-17 PROCEDURE — 87086 URINE CULTURE/COLONY COUNT: CPT

## 2023-05-17 PROCEDURE — 36415 COLL VENOUS BLD VENIPUNCTURE: CPT

## 2023-05-17 PROCEDURE — 86762 RUBELLA ANTIBODY: CPT

## 2023-05-17 PROCEDURE — 86803 HEPATITIS C AB TEST: CPT

## 2023-05-17 PROCEDURE — 85025 COMPLETE CBC W/AUTO DIFF WBC: CPT

## 2023-05-17 PROCEDURE — 86901 BLOOD TYPING SEROLOGIC RH(D): CPT

## 2023-05-17 PROCEDURE — 86900 BLOOD TYPING SEROLOGIC ABO: CPT

## 2023-05-17 PROCEDURE — G0432 EIA HIV-1/HIV-2 SCREEN: HCPCS

## 2023-05-17 PROCEDURE — 87491 CHLMYD TRACH DNA AMP PROBE: CPT

## 2023-05-18 LAB
BACTERIA SPEC AEROBE CULT: NO GROWTH
RUBV IGG SERPL IA-ACNC: 8.37 INDEX

## 2023-05-19 LAB
AMPHETAMINES UR QL SCN: NEGATIVE NG/ML
BARBITURATES UR QL SCN: NEGATIVE NG/ML
BENZODIAZ UR QL: NEGATIVE NG/ML
BZE UR QL: NEGATIVE NG/ML
C TRACH RRNA SPEC QL NAA+PROBE: NEGATIVE
CANNABINOIDS UR QL SCN: NEGATIVE NG/ML
HCV AB SERPL QL IA: NORMAL
HCV IGG SERPL QL IA: NON REACTIVE
METHADONE UR QL SCN: NEGATIVE NG/ML
N GONORRHOEA RRNA SPEC QL NAA+PROBE: NEGATIVE
OPIATES UR QL: NEGATIVE NG/ML
PCP UR QL: NEGATIVE NG/ML
PROPOXYPH UR QL SCN: NEGATIVE NG/ML
T VAGINALIS RRNA SPEC QL NAA+PROBE: NEGATIVE
TREPONEMA PALLIDUM IGG+IGM AB [PRESENCE] IN SERUM OR PLASMA BY IMMUNOASSAY: NON REACTIVE

## 2023-05-26 ENCOUNTER — LAB (OUTPATIENT)
Dept: LAB | Facility: HOSPITAL | Age: 38
End: 2023-05-26
Payer: COMMERCIAL

## 2023-05-26 DIAGNOSIS — O99.280 HYPERTHYROIDISM DURING PREGNANCY: ICD-10-CM

## 2023-05-26 DIAGNOSIS — E05.90 HYPERTHYROIDISM DURING PREGNANCY: ICD-10-CM

## 2023-05-26 DIAGNOSIS — E05.00 GRAVES' DISEASE: ICD-10-CM

## 2023-05-26 LAB
T4 FREE SERPL-MCNC: 2.31 NG/DL (ref 0.93–1.7)
TSH SERPL DL<=0.05 MIU/L-ACNC: <0.005 UIU/ML (ref 0.27–4.2)

## 2023-05-26 PROCEDURE — 84443 ASSAY THYROID STIM HORMONE: CPT

## 2023-05-26 PROCEDURE — 84439 ASSAY OF FREE THYROXINE: CPT

## 2023-05-30 DIAGNOSIS — E05.00 GRAVES' DISEASE: ICD-10-CM

## 2023-05-30 DIAGNOSIS — O99.280 HYPERTHYROIDISM DURING PREGNANCY: Primary | ICD-10-CM

## 2023-05-30 DIAGNOSIS — E05.90 HYPERTHYROIDISM DURING PREGNANCY: Primary | ICD-10-CM

## 2023-05-30 RX ORDER — METHIMAZOLE 5 MG/1
5 TABLET ORAL DAILY
Qty: 30 TABLET | Refills: 5 | Status: SHIPPED | OUTPATIENT
Start: 2023-05-30

## 2023-05-30 NOTE — PROGRESS NOTES
Spoke to patient about lab results. Starting methimazole 5 mg qAM after 06/01/2023 after pt is 14 weeks gestation.   Signed: Vidya Sousa MD

## 2023-08-14 ENCOUNTER — TRANSCRIBE ORDERS (OUTPATIENT)
Dept: LAB | Facility: HOSPITAL | Age: 38
End: 2023-08-14
Payer: COMMERCIAL

## 2023-08-14 ENCOUNTER — LAB (OUTPATIENT)
Dept: LAB | Facility: HOSPITAL | Age: 38
End: 2023-08-14
Payer: COMMERCIAL

## 2023-08-14 DIAGNOSIS — Z34.82 PRENATAL CARE, SUBSEQUENT PREGNANCY, SECOND TRIMESTER: Primary | ICD-10-CM

## 2023-08-14 DIAGNOSIS — Z34.82 PRENATAL CARE, SUBSEQUENT PREGNANCY, SECOND TRIMESTER: ICD-10-CM

## 2023-08-14 LAB
BLD GP AB SCN SERPL QL: NEGATIVE
DEPRECATED RDW RBC AUTO: 37 FL (ref 37–54)
ERYTHROCYTE [DISTWIDTH] IN BLOOD BY AUTOMATED COUNT: 12.4 % (ref 12.3–15.4)
GLUCOSE 1H P 100 G GLC PO SERPL-MCNC: 146 MG/DL (ref 65–139)
HCT VFR BLD AUTO: 34.3 % (ref 34–46.6)
HGB BLD-MCNC: 11.3 G/DL (ref 12–15.9)
MCH RBC QN AUTO: 27.8 PG (ref 26.6–33)
MCHC RBC AUTO-ENTMCNC: 32.9 G/DL (ref 31.5–35.7)
MCV RBC AUTO: 84.5 FL (ref 79–97)
PLATELET # BLD AUTO: 323 10*3/MM3 (ref 140–450)
PMV BLD AUTO: 11.1 FL (ref 6–12)
RBC # BLD AUTO: 4.06 10*6/MM3 (ref 3.77–5.28)
WBC NRBC COR # BLD: 9.24 10*3/MM3 (ref 3.4–10.8)

## 2023-08-14 PROCEDURE — 82962 GLUCOSE BLOOD TEST: CPT | Performed by: OBSTETRICS & GYNECOLOGY

## 2023-08-14 PROCEDURE — 36415 COLL VENOUS BLD VENIPUNCTURE: CPT

## 2023-08-14 PROCEDURE — 86850 RBC ANTIBODY SCREEN: CPT

## 2023-08-14 PROCEDURE — 85027 COMPLETE CBC AUTOMATED: CPT

## 2023-08-14 PROCEDURE — 82950 GLUCOSE TEST: CPT

## 2023-08-21 ENCOUNTER — LAB (OUTPATIENT)
Dept: LAB | Facility: HOSPITAL | Age: 38
End: 2023-08-21
Payer: COMMERCIAL

## 2023-08-21 DIAGNOSIS — O99.280 HYPERTHYROIDISM DURING PREGNANCY: ICD-10-CM

## 2023-08-21 DIAGNOSIS — E05.90 HYPERTHYROIDISM DURING PREGNANCY: ICD-10-CM

## 2023-08-21 DIAGNOSIS — O09.892 HISTORY OF MATERNAL SYPHILIS, CURRENTLY PREGNANT, SECOND TRIMESTER: ICD-10-CM

## 2023-08-21 DIAGNOSIS — Z3A.24 24 WEEKS GESTATION OF PREGNANCY: Primary | ICD-10-CM

## 2023-08-21 LAB
GLUCOSE 1H P 100 G GLC PO SERPL-MCNC: 110 MG/DL (ref 74–180)
GLUCOSE 2H P 100 G GLC PO SERPL-MCNC: 113 MG/DL (ref 74–155)
GLUCOSE 3H P 100 G GLC PO SERPL-MCNC: 81 MG/DL (ref 74–140)
GLUCOSE P FAST SERPL-MCNC: 86 MG/DL (ref 74–106)

## 2023-08-21 PROCEDURE — 36415 COLL VENOUS BLD VENIPUNCTURE: CPT

## 2023-08-21 PROCEDURE — 80053 COMPREHEN METABOLIC PANEL: CPT

## 2023-08-21 PROCEDURE — 84439 ASSAY OF FREE THYROXINE: CPT

## 2023-08-21 PROCEDURE — 82951 GLUCOSE TOLERANCE TEST (GTT): CPT

## 2023-08-21 PROCEDURE — 82952 GTT-ADDED SAMPLES: CPT

## 2023-08-21 PROCEDURE — 84443 ASSAY THYROID STIM HORMONE: CPT

## 2023-08-21 PROCEDURE — 82962 GLUCOSE BLOOD TEST: CPT

## 2023-08-22 DIAGNOSIS — E05.00 GRAVES' DISEASE: ICD-10-CM

## 2023-08-22 DIAGNOSIS — E05.90 HYPERTHYROIDISM DURING PREGNANCY: ICD-10-CM

## 2023-08-22 DIAGNOSIS — O99.280 HYPERTHYROIDISM DURING PREGNANCY: ICD-10-CM

## 2023-08-22 LAB
ALBUMIN SERPL-MCNC: 3.5 G/DL (ref 3.5–5.2)
ALBUMIN/GLOB SERPL: 1.3 G/DL
ALP SERPL-CCNC: 58 U/L (ref 39–117)
ALT SERPL W P-5'-P-CCNC: 13 U/L (ref 1–33)
ANION GAP SERPL CALCULATED.3IONS-SCNC: 15 MMOL/L (ref 5–15)
AST SERPL-CCNC: 13 U/L (ref 1–32)
BILIRUB SERPL-MCNC: 0.2 MG/DL (ref 0–1.2)
BUN SERPL-MCNC: 7 MG/DL (ref 6–20)
BUN/CREAT SERPL: 15.2 (ref 7–25)
CALCIUM SPEC-SCNC: 9.2 MG/DL (ref 8.6–10.5)
CHLORIDE SERPL-SCNC: 103 MMOL/L (ref 98–107)
CO2 SERPL-SCNC: 20 MMOL/L (ref 22–29)
CREAT SERPL-MCNC: 0.46 MG/DL (ref 0.57–1)
EGFRCR SERPLBLD CKD-EPI 2021: 126.6 ML/MIN/1.73
GLOBULIN UR ELPH-MCNC: 2.7 GM/DL
GLUCOSE SERPL-MCNC: 94 MG/DL (ref 65–99)
POTASSIUM SERPL-SCNC: 3.6 MMOL/L (ref 3.5–5.2)
PROT SERPL-MCNC: 6.2 G/DL (ref 6–8.5)
SODIUM SERPL-SCNC: 138 MMOL/L (ref 136–145)
T4 FREE SERPL-MCNC: 1.31 NG/DL (ref 0.93–1.7)
T4 SERPL-MCNC: 13.9 MCG/DL (ref 4.5–11.7)
TSH SERPL DL<=0.05 MIU/L-ACNC: <0.005 UIU/ML (ref 0.27–4.2)

## 2023-08-22 RX ORDER — METHIMAZOLE 5 MG/1
TABLET ORAL
Qty: 30 TABLET | Refills: 5 | Status: SHIPPED
Start: 2023-08-22

## 2023-08-22 RX ORDER — METHIMAZOLE 5 MG/1
10 TABLET ORAL
Qty: 30 TABLET | Refills: 5 | Status: SHIPPED
Start: 2023-08-22 | End: 2023-08-22

## 2023-08-22 RX ORDER — METHIMAZOLE 5 MG/1
5 TABLET ORAL DAILY
Qty: 30 TABLET | Refills: 5 | Status: SHIPPED
Start: 2023-08-22 | End: 2023-08-22

## 2023-09-19 ENCOUNTER — OFFICE VISIT (OUTPATIENT)
Dept: ENDOCRINOLOGY | Facility: CLINIC | Age: 38
End: 2023-09-19
Payer: COMMERCIAL

## 2023-09-19 VITALS
OXYGEN SATURATION: 99 % | SYSTOLIC BLOOD PRESSURE: 118 MMHG | HEART RATE: 88 BPM | HEIGHT: 64 IN | DIASTOLIC BLOOD PRESSURE: 72 MMHG | WEIGHT: 152 LBS | BODY MASS INDEX: 25.95 KG/M2

## 2023-09-19 DIAGNOSIS — E05.90 HYPERTHYROIDISM DURING PREGNANCY: Primary | ICD-10-CM

## 2023-09-19 DIAGNOSIS — E05.00 GRAVES' DISEASE: ICD-10-CM

## 2023-09-19 DIAGNOSIS — O99.280 HYPERTHYROIDISM DURING PREGNANCY: Primary | ICD-10-CM

## 2023-09-19 LAB
DEPRECATED RDW RBC AUTO: 38.1 FL (ref 37–54)
ERYTHROCYTE [DISTWIDTH] IN BLOOD BY AUTOMATED COUNT: 12.8 % (ref 12.3–15.4)
HCT VFR BLD AUTO: 34.3 % (ref 34–46.6)
HGB BLD-MCNC: 11.4 G/DL (ref 12–15.9)
MCH RBC QN AUTO: 27.7 PG (ref 26.6–33)
MCHC RBC AUTO-ENTMCNC: 33.2 G/DL (ref 31.5–35.7)
MCV RBC AUTO: 83.3 FL (ref 79–97)
PLATELET # BLD AUTO: 257 10*3/MM3 (ref 140–450)
PMV BLD AUTO: 11.4 FL (ref 6–12)
RBC # BLD AUTO: 4.12 10*6/MM3 (ref 3.77–5.28)
WBC NRBC COR # BLD: 9.15 10*3/MM3 (ref 3.4–10.8)

## 2023-09-19 PROCEDURE — 84439 ASSAY OF FREE THYROXINE: CPT | Performed by: INTERNAL MEDICINE

## 2023-09-19 PROCEDURE — 84443 ASSAY THYROID STIM HORMONE: CPT | Performed by: INTERNAL MEDICINE

## 2023-09-19 PROCEDURE — 85027 COMPLETE CBC AUTOMATED: CPT | Performed by: INTERNAL MEDICINE

## 2023-09-19 NOTE — PROGRESS NOTES
Chief Complaint   Patient presents with    Hyperthyroidism     Follow up     HPI:   Estela Angela is a 38 y.o.female who returns to Endocrine Clinic for f/u evaluation of graves disease during pregnancy. Last visit 2023. Her history is as follows:    Interim Events:  Is currently 29w5d gestation. Her  has been scheduled for 2023. Has fetal US scheduled for 10/10/2023 at 32 weeks.   - Overall feeling well.    1) Graves disease:   - first diagnosed in , by Dr. Merritt. Has been on thionamide therapy intermittently (mainly methimazole) since . Presented with palpitations, weight loss, and tremor.  - In 2016 had her first pregnancy (took PTU during that pregnancy). Postpartum was switched to methimazole.  - 18 months later, became pregnant with second child. Was on methimazole for a few weeks in the first trimester. Was switched to PTU. At 15 weeks, I switched to methimazole per TAHIRA guidelines.   - pt stopped the methimazole post-partum in 2018, TSH Rec Ab <0.50 in 2018  - restarted methimazole 5 mg daily in 2018: TSH <0.004, free T4 2.92 (0.89 - 1.76), T3 316  - had to increase her dose in 2019  - Patient was scheduled for thyroidectomy on 2019 [facility] with Dr. Shari Ambriz.  However as the surgery date approached, she decided not to pursue thyroidectomy.  Continued methimazole therapy    Pregnancy Events:  - pt notified me of her pregnancy in late 2023. Pt had been taking methimazole 30 mg daily. Stopped methimazole in first trimester and followed labs.  - (2023) Labs: TSH 0.016, free T4 1.32  - (2023) Labs: TSH < 0.005, free T4 2.82, TSI 0.51 (0.00 - 0.55), TSH Rec Ab 2.00 (0 - 1.75)  - Pt became symptomatic at the beginning of her second trimester. Started methimazole 5 mg daily on 2023, at 14 weeks gestation.  - Pt feeling better since starting methimazole on 2023. Has gained weight. Palpitations are much better.      Is currently  "29w5d gestation. Her  has been scheduled for 2023 with Dr. Baxter. Has fetal US scheduled for 10/10/2023 at 32 weeks.     Current Dose: methimazole 10 mg (4 days per week)  - Denies missed doses  - Denies fever, sore throat, or pruritus  - Denies tremor, palpitations, diarrhea, rash/itching    Review of Systems   Constitutional: Negative.  Negative for fatigue.        Expected weight gain   HENT: Negative.     Eyes: Negative.    Respiratory:  Negative for shortness of breath.    Cardiovascular:  Negative for palpitations.   Gastrointestinal: Negative.  Negative for diarrhea.   Endocrine: Negative for heat intolerance.   Genitourinary: Negative.    Musculoskeletal: Negative.    Skin: Negative.    Allergic/Immunologic: Negative.    Neurological:  Negative for tremors.   Hematological: Negative.    Psychiatric/Behavioral:  Negative for sleep disturbance. The patient is not nervous/anxious.        The following portions of the patient's history were reviewed and updated as appropriate: allergies, current medications, past family history, past medical history, past social history, past surgical history and problem list.      /72   Pulse 88   Ht 162.6 cm (64\")   Wt 68.9 kg (152 lb)   LMP 2023   SpO2 99%   BMI 26.09 kg/m²   Physical Exam   Constitutional: She is oriented to person, place, and time. She appears well-developed. No distress.   HENT:   Head: Normocephalic.   Eyes: Pupils are equal, round, and reactive to light. Conjunctivae are normal.   Neck: No tracheal deviation present. Thyromegaly (mild) present.   No palpable thyroid nodules     Cardiovascular: Normal rate, regular rhythm and normal heart sounds.   No murmur heard.  Pulmonary/Chest: Effort normal and breath sounds normal. No respiratory distress.   Abdominal: Soft. Bowel sounds are normal. There is no abdominal tenderness.   Gravid uterus   Lymphadenopathy:     She has no cervical adenopathy.   Neurological: She is alert " and oriented to person, place, and time. No cranial nerve deficit.   No Tremor in hands   Skin: Skin is warm and dry. She is not diaphoretic. No erythema.   Psychiatric: Her behavior is normal.   Vitals reviewed.    LABS/IMAGING: outside records reviewed and summarized in HPI  Results for orders placed or performed in visit on 23   TSH    Specimen: Arm, Left; Blood   Result Value Ref Range    TSH <0.005 (L) 0.270 - 4.200 uIU/mL   T4, Free    Specimen: Arm, Left; Blood   Result Value Ref Range    Free T4 1.33 0.93 - 1.70 ng/dL   T4    Specimen: Arm, Left; Blood   Result Value Ref Range    T4, Total 13.90 (H) 4.50 - 11.70 mcg/dL   CBC (No Diff)    Specimen: Blood   Result Value Ref Range    WBC 9.15 3.40 - 10.80 10*3/mm3    RBC 4.12 3.77 - 5.28 10*6/mm3    Hemoglobin 11.4 (L) 12.0 - 15.9 g/dL    Hematocrit 34.3 34.0 - 46.6 %    MCV 83.3 79.0 - 97.0 fL    MCH 27.7 26.6 - 33.0 pg    MCHC 33.2 31.5 - 35.7 g/dL    RDW 12.8 12.3 - 15.4 %    RDW-SD 38.1 37.0 - 54.0 fl    MPV 11.4 6.0 - 12.0 fL    Platelets 257 140 - 450 10*3/mm3     ASSESSMENT/PLAN:  1) Graves disease / hyperthyroidism during pregnancy:  Is currently 29w5d gestation. Her  has been scheduled for 2023. Has fetal US scheduled for 10/10/2023 at 32 weeks.     Have discussed that goal will be to maintain mild, persistent hyperthyroidism  TFT's checked today are at goal.  Continue methimazole to 10 mg (4 days per week)  Graves antibodies checked during the first trimester were low. Will measure again on 11/10/2023 with TFTs.   - Instructed pt to decrease the methimazole to 5 mg daily post-partum    RTC 2024 (post-partum)    Signed: Vidya Sousa MD

## 2023-09-20 LAB
T4 FREE SERPL-MCNC: 1.33 NG/DL (ref 0.93–1.7)
T4 SERPL-MCNC: 13.9 MCG/DL (ref 4.5–11.7)
TSH SERPL DL<=0.05 MIU/L-ACNC: <0.005 UIU/ML (ref 0.27–4.2)

## 2023-10-11 ENCOUNTER — TRANSCRIBE ORDERS (OUTPATIENT)
Dept: LAB | Facility: HOSPITAL | Age: 38
End: 2023-10-11
Payer: COMMERCIAL

## 2023-10-11 ENCOUNTER — LAB (OUTPATIENT)
Dept: LAB | Facility: HOSPITAL | Age: 38
End: 2023-10-11
Payer: COMMERCIAL

## 2023-10-11 DIAGNOSIS — Z3A.32 32 WEEKS GESTATION OF PREGNANCY: Primary | ICD-10-CM

## 2023-10-11 DIAGNOSIS — Z3A.32 32 WEEKS GESTATION OF PREGNANCY: ICD-10-CM

## 2023-10-11 LAB
BASOPHILS # BLD AUTO: 0.01 10*3/MM3 (ref 0–0.2)
BASOPHILS NFR BLD AUTO: 0.1 % (ref 0–1.5)
DEPRECATED RDW RBC AUTO: 41.4 FL (ref 37–54)
EOSINOPHIL # BLD AUTO: 0.24 10*3/MM3 (ref 0–0.4)
EOSINOPHIL NFR BLD AUTO: 3 % (ref 0.3–6.2)
ERYTHROCYTE [DISTWIDTH] IN BLOOD BY AUTOMATED COUNT: 13.4 % (ref 12.3–15.4)
HCT VFR BLD AUTO: 35.3 % (ref 34–46.6)
HGB BLD-MCNC: 11.8 G/DL (ref 12–15.9)
IMM GRANULOCYTES # BLD AUTO: 0.04 10*3/MM3 (ref 0–0.05)
IMM GRANULOCYTES NFR BLD AUTO: 0.5 % (ref 0–0.5)
LYMPHOCYTES # BLD AUTO: 2.02 10*3/MM3 (ref 0.7–3.1)
LYMPHOCYTES NFR BLD AUTO: 24.9 % (ref 19.6–45.3)
MCH RBC QN AUTO: 28.7 PG (ref 26.6–33)
MCHC RBC AUTO-ENTMCNC: 33.4 G/DL (ref 31.5–35.7)
MCV RBC AUTO: 85.9 FL (ref 79–97)
MONOCYTES # BLD AUTO: 0.48 10*3/MM3 (ref 0.1–0.9)
MONOCYTES NFR BLD AUTO: 5.9 % (ref 5–12)
NEUTROPHILS NFR BLD AUTO: 5.31 10*3/MM3 (ref 1.7–7)
NEUTROPHILS NFR BLD AUTO: 65.6 % (ref 42.7–76)
NRBC BLD AUTO-RTO: 0 /100 WBC (ref 0–0.2)
PLATELET # BLD AUTO: 220 10*3/MM3 (ref 140–450)
PMV BLD AUTO: 11.4 FL (ref 6–12)
RBC # BLD AUTO: 4.11 10*6/MM3 (ref 3.77–5.28)
WBC NRBC COR # BLD: 8.1 10*3/MM3 (ref 3.4–10.8)

## 2023-10-11 PROCEDURE — 85025 COMPLETE CBC W/AUTO DIFF WBC: CPT

## 2023-10-11 PROCEDURE — 36415 COLL VENOUS BLD VENIPUNCTURE: CPT

## 2023-11-09 LAB — EXTERNAL GROUP B STREP ANTIGEN: NEGATIVE

## 2023-11-19 ENCOUNTER — ANESTHESIA (OUTPATIENT)
Dept: OBSTETRICS AND GYNECOLOGY | Facility: HOSPITAL | Age: 38
End: 2023-11-19
Payer: COMMERCIAL

## 2023-11-19 ENCOUNTER — HOSPITAL ENCOUNTER (INPATIENT)
Facility: HOSPITAL | Age: 38
LOS: 2 days | Discharge: HOME | End: 2023-11-21
Attending: OBSTETRICS & GYNECOLOGY | Admitting: OBSTETRICS & GYNECOLOGY
Payer: COMMERCIAL

## 2023-11-19 ENCOUNTER — ANESTHESIA EVENT (OUTPATIENT)
Dept: OBSTETRICS AND GYNECOLOGY | Facility: HOSPITAL | Age: 38
End: 2023-11-19
Payer: COMMERCIAL

## 2023-11-19 PROBLEM — O34.219 VBAC, DELIVERED: Status: ACTIVE | Noted: 2023-11-19

## 2023-11-19 PROBLEM — Z37.9 NORMAL LABOR: Status: RESOLVED | Noted: 2023-11-19 | Resolved: 2023-11-19

## 2023-11-19 PROBLEM — Z37.9 NORMAL LABOR: Status: ACTIVE | Noted: 2023-11-19

## 2023-11-19 LAB
ABO GROUP BLD: NORMAL
ALP SERPL-CCNC: 164 U/L (ref 39–117)
ALT SERPL W P-5'-P-CCNC: 16 U/L (ref 1–33)
AST SERPL-CCNC: 16 U/L (ref 1–32)
BILIRUB SERPL-MCNC: 0.4 MG/DL (ref 0–1.2)
BLD GP AB SCN SERPL QL: NEGATIVE
CREAT SERPL-MCNC: 0.6 MG/DL (ref 0.57–1)
DEPRECATED RDW RBC AUTO: 44.7 FL (ref 37–54)
ERYTHROCYTE [DISTWIDTH] IN BLOOD BY AUTOMATED COUNT: 14.2 % (ref 12.3–15.4)
HCT VFR BLD AUTO: 39.7 % (ref 34–46.6)
HGB BLD-MCNC: 13.3 G/DL (ref 12–15.9)
LDH SERPL-CCNC: 155 U/L (ref 135–214)
MCH RBC QN AUTO: 28.9 PG (ref 26.6–33)
MCHC RBC AUTO-ENTMCNC: 33.5 G/DL (ref 31.5–35.7)
MCV RBC AUTO: 86.1 FL (ref 79–97)
PLATELET # BLD AUTO: 235 10*3/MM3 (ref 140–450)
PMV BLD AUTO: 12.1 FL (ref 6–12)
RBC # BLD AUTO: 4.61 10*6/MM3 (ref 3.77–5.28)
RH BLD: POSITIVE
T&S EXPIRATION DATE: NORMAL
URATE SERPL-MCNC: 4.1 MG/DL (ref 2.4–5.7)
WBC NRBC COR # BLD AUTO: 12.4 10*3/MM3 (ref 3.4–10.8)

## 2023-11-19 PROCEDURE — 83615 LACTATE (LD) (LDH) ENZYME: CPT | Performed by: OBSTETRICS & GYNECOLOGY

## 2023-11-19 PROCEDURE — 25010000002 CEFAZOLIN PER 500 MG: Performed by: OBSTETRICS & GYNECOLOGY

## 2023-11-19 PROCEDURE — 25010000002 TERBUTALINE PER 1 MG: Performed by: OBSTETRICS & GYNECOLOGY

## 2023-11-19 PROCEDURE — 59025 FETAL NON-STRESS TEST: CPT

## 2023-11-19 PROCEDURE — 25810000003 SODIUM CHLORIDE 0.9 % SOLUTION: Performed by: ANESTHESIOLOGY

## 2023-11-19 PROCEDURE — 84550 ASSAY OF BLOOD/URIC ACID: CPT | Performed by: OBSTETRICS & GYNECOLOGY

## 2023-11-19 PROCEDURE — 3E033VJ INTRODUCTION OF OTHER HORMONE INTO PERIPHERAL VEIN, PERCUTANEOUS APPROACH: ICD-10-PCS | Performed by: OBSTETRICS & GYNECOLOGY

## 2023-11-19 PROCEDURE — 86850 RBC ANTIBODY SCREEN: CPT | Performed by: OBSTETRICS & GYNECOLOGY

## 2023-11-19 PROCEDURE — C1755 CATHETER, INTRASPINAL: HCPCS | Performed by: ANESTHESIOLOGY

## 2023-11-19 PROCEDURE — 0UQMXZZ REPAIR VULVA, EXTERNAL APPROACH: ICD-10-PCS | Performed by: OBSTETRICS & GYNECOLOGY

## 2023-11-19 PROCEDURE — C1755 CATHETER, INTRASPINAL: HCPCS

## 2023-11-19 PROCEDURE — 25010000002 ROPIVACAINE PER 1 MG: Performed by: ANESTHESIOLOGY

## 2023-11-19 PROCEDURE — 84075 ASSAY ALKALINE PHOSPHATASE: CPT | Performed by: OBSTETRICS & GYNECOLOGY

## 2023-11-19 PROCEDURE — 25010000002 FENTANYL CITRATE (PF) 50 MCG/ML SOLUTION: Performed by: ANESTHESIOLOGY

## 2023-11-19 PROCEDURE — 25010000002 BUPIVACAINE (PF) 0.5 % SOLUTION: Performed by: ANESTHESIOLOGY

## 2023-11-19 PROCEDURE — 25810000003 LACTATED RINGERS PER 1000 ML: Performed by: OBSTETRICS & GYNECOLOGY

## 2023-11-19 PROCEDURE — 82565 ASSAY OF CREATININE: CPT | Performed by: OBSTETRICS & GYNECOLOGY

## 2023-11-19 PROCEDURE — 25010000002 ONDANSETRON PER 1 MG: Performed by: OBSTETRICS & GYNECOLOGY

## 2023-11-19 PROCEDURE — 82247 BILIRUBIN TOTAL: CPT | Performed by: OBSTETRICS & GYNECOLOGY

## 2023-11-19 PROCEDURE — 85027 COMPLETE CBC AUTOMATED: CPT | Performed by: OBSTETRICS & GYNECOLOGY

## 2023-11-19 PROCEDURE — 86901 BLOOD TYPING SEROLOGIC RH(D): CPT | Performed by: OBSTETRICS & GYNECOLOGY

## 2023-11-19 PROCEDURE — 86900 BLOOD TYPING SEROLOGIC ABO: CPT | Performed by: OBSTETRICS & GYNECOLOGY

## 2023-11-19 PROCEDURE — 84450 TRANSFERASE (AST) (SGOT): CPT | Performed by: OBSTETRICS & GYNECOLOGY

## 2023-11-19 PROCEDURE — 84460 ALANINE AMINO (ALT) (SGPT): CPT | Performed by: OBSTETRICS & GYNECOLOGY

## 2023-11-19 PROCEDURE — 0KQM0ZZ REPAIR PERINEUM MUSCLE, OPEN APPROACH: ICD-10-PCS | Performed by: OBSTETRICS & GYNECOLOGY

## 2023-11-19 PROCEDURE — 51703 INSERT BLADDER CATH COMPLEX: CPT

## 2023-11-19 RX ORDER — MAGNESIUM CARB/ALUMINUM HYDROX 105-160MG
30 TABLET,CHEWABLE ORAL ONCE
Status: DISCONTINUED | OUTPATIENT
Start: 2023-11-19 | End: 2023-11-20 | Stop reason: HOSPADM

## 2023-11-19 RX ORDER — PROMETHAZINE HYDROCHLORIDE 12.5 MG/1
12.5 SUPPOSITORY RECTAL EVERY 6 HOURS PRN
Status: DISCONTINUED | OUTPATIENT
Start: 2023-11-19 | End: 2023-11-20 | Stop reason: HOSPADM

## 2023-11-19 RX ORDER — IBUPROFEN 600 MG/1
600 TABLET ORAL EVERY 6 HOURS PRN
Status: DISCONTINUED | OUTPATIENT
Start: 2023-11-19 | End: 2023-11-20 | Stop reason: HOSPADM

## 2023-11-19 RX ORDER — HYDROCODONE BITARTRATE AND ACETAMINOPHEN 5; 325 MG/1; MG/1
1 TABLET ORAL EVERY 4 HOURS PRN
Status: DISCONTINUED | OUTPATIENT
Start: 2023-11-19 | End: 2023-11-20 | Stop reason: HOSPADM

## 2023-11-19 RX ORDER — METHYLERGONOVINE MALEATE 0.2 MG/ML
200 INJECTION INTRAVENOUS ONCE AS NEEDED
Status: DISCONTINUED | OUTPATIENT
Start: 2023-11-19 | End: 2023-11-20 | Stop reason: HOSPADM

## 2023-11-19 RX ORDER — HYDROXYZINE HYDROCHLORIDE 25 MG/1
50 TABLET, FILM COATED ORAL NIGHTLY PRN
Status: DISCONTINUED | OUTPATIENT
Start: 2023-11-19 | End: 2023-11-20 | Stop reason: HOSPADM

## 2023-11-19 RX ORDER — METOCLOPRAMIDE HYDROCHLORIDE 5 MG/ML
10 INJECTION INTRAMUSCULAR; INTRAVENOUS ONCE AS NEEDED
Status: DISCONTINUED | OUTPATIENT
Start: 2023-11-19 | End: 2023-11-20 | Stop reason: HOSPADM

## 2023-11-19 RX ORDER — SODIUM CHLORIDE 9 MG/ML
40 INJECTION, SOLUTION INTRAVENOUS AS NEEDED
Status: DISCONTINUED | OUTPATIENT
Start: 2023-11-19 | End: 2023-11-20 | Stop reason: HOSPADM

## 2023-11-19 RX ORDER — FAMOTIDINE 10 MG/ML
20 INJECTION, SOLUTION INTRAVENOUS ONCE AS NEEDED
Status: DISCONTINUED | OUTPATIENT
Start: 2023-11-19 | End: 2023-11-20 | Stop reason: HOSPADM

## 2023-11-19 RX ORDER — EPHEDRINE SULFATE 5 MG/ML
10 INJECTION INTRAVENOUS
Status: DISCONTINUED | OUTPATIENT
Start: 2023-11-19 | End: 2023-11-20 | Stop reason: HOSPADM

## 2023-11-19 RX ORDER — SODIUM CHLORIDE 9 MG/ML
INJECTION, SOLUTION INTRAVENOUS
Status: DISCONTINUED
Start: 2023-11-19 | End: 2023-11-21 | Stop reason: HOSPADM

## 2023-11-19 RX ORDER — CARBOPROST TROMETHAMINE 250 UG/ML
250 INJECTION, SOLUTION INTRAMUSCULAR AS NEEDED
Status: DISCONTINUED | OUTPATIENT
Start: 2023-11-19 | End: 2023-11-20 | Stop reason: HOSPADM

## 2023-11-19 RX ORDER — DIPHENHYDRAMINE HYDROCHLORIDE 50 MG/ML
12.5 INJECTION INTRAMUSCULAR; INTRAVENOUS EVERY 8 HOURS PRN
Status: DISCONTINUED | OUTPATIENT
Start: 2023-11-19 | End: 2023-11-20 | Stop reason: HOSPADM

## 2023-11-19 RX ORDER — SODIUM CHLORIDE 0.9 % (FLUSH) 0.9 %
10 SYRINGE (ML) INJECTION EVERY 12 HOURS SCHEDULED
Status: DISCONTINUED | OUTPATIENT
Start: 2023-11-19 | End: 2023-11-20 | Stop reason: HOSPADM

## 2023-11-19 RX ORDER — FENTANYL CITRATE 50 UG/ML
25 INJECTION, SOLUTION INTRAMUSCULAR; INTRAVENOUS
Status: DISCONTINUED | OUTPATIENT
Start: 2023-11-19 | End: 2023-11-20

## 2023-11-19 RX ORDER — PROMETHAZINE HYDROCHLORIDE 12.5 MG/1
12.5 TABLET ORAL EVERY 6 HOURS PRN
Status: DISCONTINUED | OUTPATIENT
Start: 2023-11-19 | End: 2023-11-20 | Stop reason: HOSPADM

## 2023-11-19 RX ORDER — PRENATAL WITH FERROUS FUM AND FOLIC ACID 3080; 920; 120; 400; 22; 1.84; 3; 20; 10; 1; 12; 200; 27; 25; 2 [IU]/1; [IU]/1; MG/1; [IU]/1; MG/1; MG/1; MG/1; MG/1; MG/1; MG/1; UG/1; MG/1; MG/1; MG/1; MG/1
TABLET ORAL DAILY
COMMUNITY

## 2023-11-19 RX ORDER — SODIUM CHLORIDE, SODIUM LACTATE, POTASSIUM CHLORIDE, CALCIUM CHLORIDE 600; 310; 30; 20 MG/100ML; MG/100ML; MG/100ML; MG/100ML
125 INJECTION, SOLUTION INTRAVENOUS CONTINUOUS
Status: DISCONTINUED | OUTPATIENT
Start: 2023-11-19 | End: 2023-11-20

## 2023-11-19 RX ORDER — TERBUTALINE SULFATE 1 MG/ML
0.25 INJECTION, SOLUTION SUBCUTANEOUS ONCE
Status: COMPLETED | OUTPATIENT
Start: 2023-11-19 | End: 2023-11-19

## 2023-11-19 RX ORDER — ONDANSETRON 4 MG/1
4 TABLET, FILM COATED ORAL EVERY 6 HOURS PRN
Status: DISCONTINUED | OUTPATIENT
Start: 2023-11-19 | End: 2023-11-20 | Stop reason: HOSPADM

## 2023-11-19 RX ORDER — OXYTOCIN/0.9 % SODIUM CHLORIDE 30/500 ML
250 PLASTIC BAG, INJECTION (ML) INTRAVENOUS CONTINUOUS
Status: ACTIVE | OUTPATIENT
Start: 2023-11-19 | End: 2023-11-19

## 2023-11-19 RX ORDER — ONDANSETRON 4 MG/1
4 TABLET, FILM COATED ORAL EVERY 6 HOURS PRN
Status: DISCONTINUED | OUTPATIENT
Start: 2023-11-19 | End: 2023-11-19 | Stop reason: SDUPTHER

## 2023-11-19 RX ORDER — LIDOCAINE HYDROCHLORIDE AND EPINEPHRINE 15; 5 MG/ML; UG/ML
INJECTION, SOLUTION EPIDURAL AS NEEDED
Status: DISCONTINUED | OUTPATIENT
Start: 2023-11-19 | End: 2023-11-19 | Stop reason: SURG

## 2023-11-19 RX ORDER — CITRIC ACID/SODIUM CITRATE 334-500MG
30 SOLUTION, ORAL ORAL ONCE
Status: DISCONTINUED | OUTPATIENT
Start: 2023-11-19 | End: 2023-11-20 | Stop reason: HOSPADM

## 2023-11-19 RX ORDER — OXYTOCIN/0.9 % SODIUM CHLORIDE 30/500 ML
999 PLASTIC BAG, INJECTION (ML) INTRAVENOUS ONCE
Status: DISCONTINUED | OUTPATIENT
Start: 2023-11-19 | End: 2023-11-20 | Stop reason: HOSPADM

## 2023-11-19 RX ORDER — OXYTOCIN/0.9 % SODIUM CHLORIDE 30/500 ML
2 PLASTIC BAG, INJECTION (ML) INTRAVENOUS
Status: DISCONTINUED | OUTPATIENT
Start: 2023-11-19 | End: 2023-11-20

## 2023-11-19 RX ORDER — SODIUM CHLORIDE 0.9 % (FLUSH) 0.9 %
10 SYRINGE (ML) INJECTION AS NEEDED
Status: DISCONTINUED | OUTPATIENT
Start: 2023-11-19 | End: 2023-11-20 | Stop reason: HOSPADM

## 2023-11-19 RX ORDER — TERBUTALINE SULFATE 1 MG/ML
INJECTION, SOLUTION SUBCUTANEOUS
Status: DISCONTINUED
Start: 2023-11-19 | End: 2023-11-21 | Stop reason: HOSPADM

## 2023-11-19 RX ORDER — ONDANSETRON 2 MG/ML
4 INJECTION INTRAMUSCULAR; INTRAVENOUS EVERY 6 HOURS PRN
Status: DISCONTINUED | OUTPATIENT
Start: 2023-11-19 | End: 2023-11-20 | Stop reason: HOSPADM

## 2023-11-19 RX ORDER — ONDANSETRON 2 MG/ML
4 INJECTION INTRAMUSCULAR; INTRAVENOUS EVERY 6 HOURS PRN
Status: DISCONTINUED | OUTPATIENT
Start: 2023-11-19 | End: 2023-11-19 | Stop reason: SDUPTHER

## 2023-11-19 RX ORDER — BUPIVACAINE HYDROCHLORIDE 5 MG/ML
INJECTION, SOLUTION EPIDURAL; INTRACAUDAL AS NEEDED
Status: DISCONTINUED | OUTPATIENT
Start: 2023-11-19 | End: 2023-11-19 | Stop reason: SURG

## 2023-11-19 RX ORDER — ONDANSETRON 2 MG/ML
4 INJECTION INTRAMUSCULAR; INTRAVENOUS ONCE AS NEEDED
Status: DISCONTINUED | OUTPATIENT
Start: 2023-11-19 | End: 2023-11-19 | Stop reason: SDUPTHER

## 2023-11-19 RX ORDER — LIDOCAINE HYDROCHLORIDE 10 MG/ML
0.5 INJECTION, SOLUTION EPIDURAL; INFILTRATION; INTRACAUDAL; PERINEURAL ONCE AS NEEDED
Status: DISCONTINUED | OUTPATIENT
Start: 2023-11-19 | End: 2023-11-20 | Stop reason: HOSPADM

## 2023-11-19 RX ORDER — ACETAMINOPHEN 325 MG/1
650 TABLET ORAL EVERY 4 HOURS PRN
Status: DISCONTINUED | OUTPATIENT
Start: 2023-11-19 | End: 2023-11-20 | Stop reason: HOSPADM

## 2023-11-19 RX ORDER — MISOPROSTOL 200 UG/1
800 TABLET ORAL AS NEEDED
Status: DISCONTINUED | OUTPATIENT
Start: 2023-11-19 | End: 2023-11-20 | Stop reason: HOSPADM

## 2023-11-19 RX ORDER — OXYTOCIN/0.9 % SODIUM CHLORIDE 30/500 ML
PLASTIC BAG, INJECTION (ML) INTRAVENOUS
Status: DISCONTINUED
Start: 2023-11-19 | End: 2023-11-21 | Stop reason: HOSPADM

## 2023-11-19 RX ADMIN — EPHEDRINE SULFATE 10 MG: 5 INJECTION INTRAVENOUS at 18:23

## 2023-11-19 RX ADMIN — LIDOCAINE HYDROCHLORIDE AND EPINEPHRINE 3 ML: 15; 5 INJECTION, SOLUTION EPIDURAL at 14:16

## 2023-11-19 RX ADMIN — SODIUM CHLORIDE, POTASSIUM CHLORIDE, SODIUM LACTATE AND CALCIUM CHLORIDE 999 ML/HR: 600; 310; 30; 20 INJECTION, SOLUTION INTRAVENOUS at 13:35

## 2023-11-19 RX ADMIN — BUPIVACAINE HYDROCHLORIDE 6 ML: 5 INJECTION, SOLUTION EPIDURAL; INTRACAUDAL at 14:16

## 2023-11-19 RX ADMIN — Medication 2 MILLI-UNITS/MIN: at 20:06

## 2023-11-19 RX ADMIN — TERBUTALINE SULFATE 0.25 MG: 1 INJECTION, SOLUTION SUBCUTANEOUS at 18:29

## 2023-11-19 RX ADMIN — ONDANSETRON 4 MG: 2 INJECTION INTRAMUSCULAR; INTRAVENOUS at 20:31

## 2023-11-19 RX ADMIN — SODIUM CHLORIDE, POTASSIUM CHLORIDE, SODIUM LACTATE AND CALCIUM CHLORIDE 125 ML/HR: 600; 310; 30; 20 INJECTION, SOLUTION INTRAVENOUS at 14:27

## 2023-11-19 RX ADMIN — Medication 250 ML/HR: at 22:45

## 2023-11-19 RX ADMIN — SODIUM CHLORIDE 2000 MG: 900 INJECTION INTRAVENOUS at 22:42

## 2023-11-19 NOTE — ANESTHESIA PROCEDURE NOTES
Labor Epidural      Patient reassessed immediately prior to procedure    Patient location during procedure: OB  Start Time: 11/19/2023 2:06 PM  Stop Time: 11/19/2023 2:33 PM  Performed By  Anesthesiologist: Gregory Robins MD  Preanesthetic Checklist  Completed: patient identified, IV checked, site marked, risks and benefits discussed, surgical consent, monitors and equipment checked, pre-op evaluation and timeout performed  Prep:  Pt Position:sitting  Sterile Tech:cap, gloves, mask and sterile barrier  Prep:povidone-iodine 7.5% surgical scrub  Monitoring:blood pressure monitoring  Epidural Block Procedure:  Approach:midline  Guidance:landmark technique  Location:L3-L4  Needle Type:Tuohy  Needle Gauge:17 G  Loss of Resistance Medium: air  Loss of Resistance: 5cm  Cath Depth at skin:15 cm  Paresthesia: none  Aspiration:negative  Test Dose:negative  Number of Attempts: 1  Post Assessment:  Dressing:occlusive dressing applied and secured with tape  Pt Tolerance:patient tolerated the procedure well with no apparent complications  Complications:no

## 2023-11-19 NOTE — ANESTHESIA PREPROCEDURE EVALUATION
Anesthesia Evaluation     Patient summary reviewed and Nursing notes reviewed                Airway   Mallampati: I  TM distance: >3 FB  Neck ROM: full  No difficulty expected  Dental - normal exam     Pulmonary - negative pulmonary ROS and normal exam   Cardiovascular - negative cardio ROS and normal exam        Neuro/Psych- negative ROS  GI/Hepatic/Renal/Endo - negative ROS     Musculoskeletal (-) negative ROS    Abdominal  - normal exam    Bowel sounds: normal.   Substance History - negative use     OB/GYN    (+) Pregnant        Other                    Anesthesia Plan    ASA 2 - emergent     epidural       Anesthetic plan, risks, benefits, and alternatives have been provided, discussed and informed consent has been obtained with: patient.    Use of blood products discussed with patient .    Plan discussed with attending.    CODE STATUS:    Code Status (Patient has no pulse and is not breathing): CPR (Attempt to Resuscitate)  Medical Interventions (Patient has pulse or is breathing): Full       Advised daughter in law, medication dosage can only be changed at office visit,  appt 12/06      ----- Message from Candace Santos sent at 11/27/2019 11:25 AM CST -----  Contact: self  Type: Patient Call Back    Who called: Self    What is the request in detail: metoprolol succinate (TOPROL-XL) 100 MG 24 hr tablet    Can the clinic reply by [facility]? no    Would the patient rather a call back or a response via [facility]? Call   Best call back number: [phone]      [facility] - [facility] [address] [phone] (Phone)  [phone] (Fax)

## 2023-11-19 NOTE — PROGRESS NOTES
Called by RN due to late FHR decelerations.  Laborist to room, FSE and IUPC placed.  Patient noted to to be significantly hypotensive with blood pressure 70/40.  Deep late fetal heart rate decelerations noted.  Ephedrine administered as well as terbutaline.  Fetal heart rate decelerations resolved completely with normalization of blood pressure.  I then arrived to patient's room.  Fetal heart tones currently category 1.  SVE 9/100/-1.  Patient desires to continue with trial of labor after .  Plan to repeat cervical exam in 1 hour.  May need to start Pitocin due to terbutaline administration.

## 2023-11-19 NOTE — H&P
Aleksey  Obstetric History and Physical    Chief Complaint   Patient presents with    Contractions       Subjective     Patient is a 38 y.o. female  currently at 38w1d, who presents for term labor  with ROM.  Her contractions became regular and painful around 0000 today.  She had SROM once admitted to L&D.  Meconium stained fluid noted.      Her prenatal care is complicated by maternal Grave's disease.  She is managed by endocrinology.  She has had 2 previous CS.  She strongly desires TOLAC.      The following portions of the patients history were reviewed and updated as appropriate: current medications, allergies, past medical history, past surgical history, past family history, past social history, and problem list .       Prenatal Information:   Maternal Prenatal Labs  Blood Type ABO Type   Date Value Ref Range Status   2023 O  Final      Rh Status RH type   Date Value Ref Range Status   2023 Positive  Final      Antibody Screen Antibody Screen   Date Value Ref Range Status   2023 Negative  Final                        Past OB History:       OB History    Para Term  AB Living   3 2 1 1 0 2   SAB IAB Ectopic Molar Multiple Live Births   0 0 0 0 0 2      # Outcome Date GA Lbr Darwin/2nd Weight Sex Delivery Anes PTL Lv   3 Current            2 Term 18 40w1d  3751 g (8 lb 4.3 oz) M CS-LTranv Spinal N MIKE      Name: Americo      Apgar1: 6  Apgar5: 8   1  16 35w6d  2653 g (5 lb 13.6 oz) M CS-LTranv Spinal N MIKE      Name: Av      Apgar1: 6  Apgar5: 8      Obstetric Comments   Pt states G1 CS r/t fetal intolerance on NST in office. Child is autistic.   G2 CS was r/t failure to go into labor on her own before scheduled RCS.       Past Medical History: Past Medical History:   Diagnosis Date    Abnormal Pap smear of cervix     Chlamydia     Graves disease     HPV (human papilloma virus) infection     Urinary tract infection       Past Surgical History Past  Surgical History:   Procedure Laterality Date     SECTION Bilateral 2016    Procedure:  SECTION PRIMARY;  Surgeon: Julissa Fuentes MD;  Location:  [facility];  Service:      SECTION N/A 2018    Procedure:  SECTION REPEAT *39WKS*;  Surgeon: Padmini Baxter MD;  Location:  [facility];  Service:     WISDOM TOOTH EXTRACTION        Family History: Family History   Problem Relation Age of Onset    Diabetes Mother     Hypertension Mother     Hyperlipidemia Mother     Other Father         MVA    No Known Problems Sister     No Known Problems Brother     Diabetes Maternal Grandmother     No Known Problems Maternal Grandfather     Stroke Paternal Grandfather     No Known Problems Son         11 months old      Social History:  reports that she has never smoked. She has never used smokeless tobacco.   reports no history of alcohol use.   reports no history of drug use.        REVIEW OF SYSTEMS              Reports fetal movement is normal             Reports leakage of amniotic fluid.             Denies vaginal bleeding                           All other systems reviewed and are negative    Objective       Vital Signs Range for the last 24 hours  Temperature: Temp:  [97.6 °F (36.4 °C)-98.8 °F (37.1 °C)] 98.8 °F (37.1 °C)   Temp Source: Temp src: Oral   BP: BP: ()/(48-82) 100/58   Pulse: Heart Rate:  [] 127   Respirations: Resp:  [20] 20   SPO2:     O2 Amount (l/min):     O2 Devices Device (Oxygen Therapy): room air   Weight: Weight:  [72.6 kg (160 lb)] 72.6 kg (160 lb)       Presentation: vtx   Cervix: Exam by: Method: sterile exam per RN   Dilation: Cervical Dilation (cm): 4   Effacement: Cervical Effacement: %   Station: Fetal Station: -1        Fetal Heart Rate Assessment   Method: Fetal HR Assessment Method: external   Beats/min: Fetal HR (beats/min): 140   Baseline: Fetal HR Baseline: normal range   Variability: Fetal HR Variability:  moderate (amplitude range 6 to 25 bpm)   Accels: Fetal HR Accelerations: greater than/equal to 15 bpm, lasting at least 15 seconds   Decels: Fetal HR Decelerations: early   Tracing Category:  1     Uterine Assessment   Method: Method: external tocotransducer   Frequency (min): Contraction Frequency (Minutes): 6   Ctx Count in 10 min:     Duration:     Intensity: Contraction Intensity: moderate by palpation   Intensity by IUPC:     Resting Tone: Uterine Resting Tone: soft by palpation   Resting Tone by IUPC:     Caleb Units:       Constitutional:  Well developed, well nourished, no acute distress, well-groomed.   Neurologic:  Alert & oriented x 3,  no focal deficits noted.   Psychiatric:  Speech and behavior appropriate.   Extremities: no cyanosis, clubbing or edema, no evidence of DVT.        Labs:  Lab Results   Component Value Date    WBC 12.40 (H) 2023    HGB 13.3 2023    HCT 39.7 2023    MCV 86.1 2023     2023    POCGLU 115 2017    CREATININE 0.60 2023    URICACID 4.1 2023    AST 16 2023    ALT 16 2023     2023     Results from last 7 days   Lab Units 23  1330   ABO TYPING  O   RH TYPING  Positive   ANTIBODY SCREEN  Negative           Assessment & Plan       Normal labor        Assessment:  1.  Intrauterine pregnancy at 38w1d weeks gestation with reactive, reassuring fetal status.    2.   term labor  with ROM  3.  Obstetrical history significant for  maternal Grave's disease managed by endocrinology .  Was hyperthyroid and on methimazole prior to pregnancy.  She has had 2 previous CS. She strongly desires TOLAC.  Has been extensively counseled on risks of uterine rupture and potential detrimental maternal and  outcomes.  4.  GBS status: neg    Plan:  1.Expectant management.  Discussed pitocin augmentation prn inadequate contractions or lack of cervical change.  She will consider.  Discussed low threshold for RCS  with [facility].  [facility] Cat 1 currently.    2. Plan of care has been reviewed with patient and questions answered.  3.  Risks, benefits of treatment plan have been discussed.  4.  All questions have been answered.        Padmini Baxter MD  11/19/2023  16:37 EST

## 2023-11-20 LAB
HCT VFR BLD AUTO: 33.8 % (ref 34–46.6)
HGB BLD-MCNC: 11.2 G/DL (ref 12–15.9)

## 2023-11-20 PROCEDURE — 85018 HEMOGLOBIN: CPT | Performed by: OBSTETRICS & GYNECOLOGY

## 2023-11-20 PROCEDURE — 85014 HEMATOCRIT: CPT | Performed by: OBSTETRICS & GYNECOLOGY

## 2023-11-20 RX ORDER — OXYTOCIN/0.9 % SODIUM CHLORIDE 30/500 ML
125 PLASTIC BAG, INJECTION (ML) INTRAVENOUS CONTINUOUS PRN
Status: DISCONTINUED | OUTPATIENT
Start: 2023-11-20 | End: 2023-11-21 | Stop reason: HOSPADM

## 2023-11-20 RX ORDER — ACETAMINOPHEN 325 MG/1
650 TABLET ORAL EVERY 6 HOURS PRN
Status: DISCONTINUED | OUTPATIENT
Start: 2023-11-20 | End: 2023-11-21 | Stop reason: HOSPADM

## 2023-11-20 RX ORDER — IBUPROFEN 600 MG/1
600 TABLET ORAL EVERY 6 HOURS PRN
Qty: 60 TABLET | Refills: 0 | Status: SHIPPED | OUTPATIENT
Start: 2023-11-20

## 2023-11-20 RX ORDER — HYDROCORTISONE 25 MG/G
1 CREAM TOPICAL AS NEEDED
Status: DISCONTINUED | OUTPATIENT
Start: 2023-11-20 | End: 2023-11-21 | Stop reason: HOSPADM

## 2023-11-20 RX ORDER — PSEUDOEPHEDRINE HCL 30 MG
100 TABLET ORAL 2 TIMES DAILY
Qty: 60 CAPSULE | Refills: 0 | Status: SHIPPED | OUTPATIENT
Start: 2023-11-20

## 2023-11-20 RX ORDER — TRAMADOL HYDROCHLORIDE 50 MG/1
50 TABLET ORAL EVERY 6 HOURS PRN
Status: DISCONTINUED | OUTPATIENT
Start: 2023-11-20 | End: 2023-11-21 | Stop reason: HOSPADM

## 2023-11-20 RX ORDER — BISACODYL 10 MG
10 SUPPOSITORY, RECTAL RECTAL DAILY PRN
Status: DISCONTINUED | OUTPATIENT
Start: 2023-11-20 | End: 2023-11-21 | Stop reason: HOSPADM

## 2023-11-20 RX ORDER — PRENATAL VIT/IRON FUM/FOLIC AC 27MG-0.8MG
1 TABLET ORAL DAILY
Status: DISCONTINUED | OUTPATIENT
Start: 2023-11-20 | End: 2023-11-21 | Stop reason: HOSPADM

## 2023-11-20 RX ORDER — DOCUSATE SODIUM 100 MG/1
100 CAPSULE, LIQUID FILLED ORAL 2 TIMES DAILY
Status: DISCONTINUED | OUTPATIENT
Start: 2023-11-20 | End: 2023-11-21 | Stop reason: HOSPADM

## 2023-11-20 RX ORDER — SODIUM CHLORIDE 0.9 % (FLUSH) 0.9 %
1-10 SYRINGE (ML) INJECTION AS NEEDED
Status: DISCONTINUED | OUTPATIENT
Start: 2023-11-20 | End: 2023-11-21 | Stop reason: HOSPADM

## 2023-11-20 RX ORDER — ONDANSETRON 4 MG/1
4 TABLET, FILM COATED ORAL EVERY 6 HOURS PRN
Status: DISCONTINUED | OUTPATIENT
Start: 2023-11-20 | End: 2023-11-21 | Stop reason: HOSPADM

## 2023-11-20 RX ORDER — SIMETHICONE 80 MG
80 TABLET,CHEWABLE ORAL 4 TIMES DAILY PRN
Status: DISCONTINUED | OUTPATIENT
Start: 2023-11-20 | End: 2023-11-21 | Stop reason: HOSPADM

## 2023-11-20 RX ORDER — POLYETHYLENE GLYCOL 3350 17 G/17G
17 POWDER, FOR SOLUTION ORAL DAILY PRN
Status: DISCONTINUED | OUTPATIENT
Start: 2023-11-20 | End: 2023-11-21 | Stop reason: HOSPADM

## 2023-11-20 RX ORDER — ONDANSETRON 2 MG/ML
4 INJECTION INTRAMUSCULAR; INTRAVENOUS EVERY 6 HOURS PRN
Status: DISCONTINUED | OUTPATIENT
Start: 2023-11-20 | End: 2023-11-21 | Stop reason: HOSPADM

## 2023-11-20 RX ORDER — IBUPROFEN 600 MG/1
600 TABLET ORAL EVERY 6 HOURS PRN
Status: DISCONTINUED | OUTPATIENT
Start: 2023-11-20 | End: 2023-11-21 | Stop reason: HOSPADM

## 2023-11-20 RX ADMIN — IBUPROFEN 600 MG: 600 TABLET, FILM COATED ORAL at 16:17

## 2023-11-20 RX ADMIN — ACETAMINOPHEN 650 MG: 325 TABLET ORAL at 12:21

## 2023-11-20 RX ADMIN — IBUPROFEN 600 MG: 600 TABLET, FILM COATED ORAL at 08:37

## 2023-11-20 RX ADMIN — TRAMADOL HYDROCHLORIDE 50 MG: 50 TABLET ORAL at 05:45

## 2023-11-20 RX ADMIN — WITCH HAZEL: 500 SOLUTION RECTAL; TOPICAL at 00:52

## 2023-11-20 RX ADMIN — IBUPROFEN 600 MG: 600 TABLET, FILM COATED ORAL at 01:31

## 2023-11-20 RX ADMIN — BENZOCAINE AND LEVOMENTHOL: 200; 5 SPRAY TOPICAL at 00:52

## 2023-11-20 RX ADMIN — DOCUSATE SODIUM 100 MG: 100 CAPSULE, LIQUID FILLED ORAL at 08:37

## 2023-11-20 RX ADMIN — DOCUSATE SODIUM 100 MG: 100 CAPSULE, LIQUID FILLED ORAL at 20:13

## 2023-11-20 RX ADMIN — PRENATAL VITAMINS-IRON FUMARATE 27 MG IRON-FOLIC ACID 0.8 MG TABLET 1 TABLET: at 08:37

## 2023-11-20 RX ADMIN — BENZOCAINE 1 APPLICATION: 5.6 OINTMENT TOPICAL at 00:52

## 2023-11-20 RX ADMIN — ACETAMINOPHEN 650 MG: 325 TABLET ORAL at 20:13

## 2023-11-20 RX ADMIN — TRAMADOL HYDROCHLORIDE 50 MG: 50 TABLET ORAL at 22:55

## 2023-11-20 NOTE — LACTATION NOTE
23 0823   Maternal Information   Date of Referral 23   Person Making Referral lactation consultant  (newly postpartum)   Maternal Reason for Referral   (3rd time mother; nursed 1st and 2nd for 1.5 years)   Infant Reason for Referral  infant   Maternal Assessment   Breast Size Issue none   Breast Shape Bilateral:;round   Breast Density Bilateral:;soft   Nipples Bilateral:;everted   Left Nipple Symptoms intact;tender  (provided cooling gels and reiterated deep latching)   Right Nipple Symptoms intact;redness;tender  (provided cooling gels and reiterated deep latching)   Maternal Infant Feeding   Maternal Emotional State independent;receptive;relaxed   Infant Positioning   (infant in nursery; mother reporting well with infant willing to nurse, but tends to get shallow in latching; encouraged to call LC for latch check if needs)   Pain with Feeding yes  (with shallow latching; has medium nipple shield; provided small nipple shield for better fit)   Comfort Measures Before/During Feeding other (see comments)  (encouraged lanolin or hand expression of colostrum to rub on bilateral nipples and provided cooling gels)   Latch Assistance verbal guidance offered   Support Person Involvement   (not in room at time of LC visit)   Milk Expression/Equipment   Breast Pump Type double electric, personal   Equipment for Home Use breast pump provided  (provided RX Spectra pump along with QR code; may pump for short/missed feedings or if too painful to latch infant)     Courtesy visit with newly postpartum couplet; mom reporting well with nursing infant; independent; mother did complain of redness on bilateral nipples; mother has medium nipple shield at bedside but provided small nipple shield for better fit; infant not in room at time of lactation visit; encouraged mother to call lactation to check latch if needed; provided cooling gels for comfort and reiterated deep latching; mother needed a pump so provided  Spectra pump; went over educational materials; encouraged to call lactation as needed or had any questions concerns.

## 2023-11-20 NOTE — PROGRESS NOTES
2023  PPD #1    Subjective   Estela feels well.  Patient describes her lochia as less than menses.  Pain is well controlled       Objective   Temp: Temp:  [97.5 °F (36.4 °C)-99 °F (37.2 °C)] 98.7 °F (37.1 °C) Temp src: Oral   BP: BP: ()/(45-82) 91/54        Pulse: Heart Rate:  [] 77  RR: Resp:  [16-20] 18    General:  No acute distress   Abdomen: Fundus firm and beneath umbilicus   Pelvis: deferred     Lab Results   Component Value Date    WBC 12.40 (H) 2023    HGB 11.2 (L) 2023    HCT 33.8 (L) 2023    MCV 86.1 2023     2023    POCGLU 115 2017    CREATININE 0.60 2023    URICACID 4.1 2023    AST 16 2023    ALT 16 2023     2023    RUBELLAIGGIN Immune 2016    HEPBSAG Non-Reactive 2023     Results from last 7 days   Lab Units 23  1330   ABO TYPING  O   RH TYPING  Positive   ANTIBODY SCREEN  Negative       Assessment  PPD# 1 after successful forcep assisted vaginal delivery after      Plan  Supportive care, discharge as clinically indicated.       This note has been electronically signed.    Luz Clarke CNM  08:35 EST  2023

## 2023-11-20 NOTE — L&D DELIVERY NOTE
[facility]   Vaginal Delivery Note    Patient Name: Estela Angela  : 1985  MRN: [id number]    Date of Delivery: 2023     Diagnosis     Pre & Post-Delivery:  Intrauterine pregnancy at 38w1d  Labor status: Spontaneous Onset of Labor     , delivered             Problem List    Transfer to Postpartum     Review the Delivery Report for details.     Delivery     Delivery: Vaginal, Spontaneous     YOB: 2022    Time of Birth:  Gestational Age 10:15 PM   38w1d     Anesthesia: Epidural     Delivering clinician:  Padmini Baxter MD    Forceps?   Yes  Forcep Delivery   Forceps type:  Closed simpsons   Application location: low   Number of pulls:     Total application time:     Applied by: DR. FREY    Failed? No       Vacuum? No    Shoulder dystocia present: No        Delivery narrative:  patient pushed with good effort x 60min.  Cat 2 FHT due to variable decelerations w/ pushing.  Discussed that due to previous CS x 2, expediting delivery recommended.  Discussed risks, benefits, alternatives to attempted FAVD vs RCS.  We discussed fetal and maternal risks.  Bladder drained w/ straight catheter.  Fetal presentation confirmed--OA.  Complete cervical dilation confirmed.  Adequate anesthesia w/ epidural confirmed.  Closed allan forceps applied w/o difficulty by Dr. Frey.  Excellent descent of fetal head noted over 2 contractions.  Forceps then removed.  FHT had completely recovered by this point, so the patient pushed without assistance for several more contractions.  The fetal head then delivered.  The anterior shoulder delivered with ease. The body delivered atraumatically. Infant vigorous and crying so placed on maternal abdomen. Delayed cord clamping x 2 min.  Second degree perineal and right periurethral laceration repaired in standard fashion w/ 3-0 vicryl rapide.  Uterine sweep performed due to earlier FHR decelerations, and no defect in previous hysterotomy noted.    "Ancef 2g given for prophylaxis.       Infant     Findings: male  infant     Infant observations: Weight: 8#1oz  Length:   in  Observations/Comments:        Apgars: 8  @ 1 minute /    9  @ 5 minutes   Infant Name: Hiren     Placenta & Cord         Placenta delivered  Spontaneous  at   11/19/2023 10:20 PM     Cord:   present.   Nuchal Cord?  no   Cord blood obtained:     Cord gases obtained:      Cord gas results: Venous:  No results found for: \"PHCVEN\", \"BECVEN\"    Arterial:  No results found for: \"PHCART\", \"BECART\"     Repair     Episiotomy: Not recorded     No    Lacerations: Yes  Laceration Information  Laceration Repaired?   Perineal:  2nd yes    Periurethral:  right yes   Labial:       Sulcus:       Vaginal:       Cervical:         Suture used for repair: 3-0 Vicryl      Quantitative Blood Loss:  300mL          Complications     none    Disposition     Mother to Mother Baby/Postpartum  in stable condition currently.  Baby to remains with mom  in stable condition currently.    Padmini Baxter MD  11/19/23  22:36 EST        "

## 2023-11-20 NOTE — ANESTHESIA POSTPROCEDURE EVALUATION
Patient: Estela Angela    Procedure Summary       Date: 23 Room / Location: [facility] DELIVERY    Anesthesia Start:  Anesthesia Stop:     Procedure:  SECTION REPEAT (Abdomen) Diagnosis:     Surgeons: Padmini Baxter MD Provider: Gregory Robins MD    Anesthesia Type: epidural ASA Status: 2 - Emergent            Anesthesia Type: epidural    Vitals  Vitals Value Taken Time   BP 91/54 23 0751   Temp 98.7 °F (37.1 °C) 23 0751   Pulse 77 23 0751   Resp 18 23 0751   SpO2             Post Anesthesia Care and Evaluation    Patient location during evaluation: bedside  Patient participation: complete - patient participated  Level of consciousness: awake and alert  Pain management: adequate    Airway patency: patent  Anesthetic complications: No anesthetic complications    Cardiovascular status: acceptable  Respiratory status: acceptable  Hydration status: acceptable  Post Neuraxial Block status: Motor and sensory function returned to baseline and No signs or symptoms of PDPH

## 2023-11-20 NOTE — PAYOR COMM NOTE
"  Notification of Delivery  ACC/Utilization Review  Phone: [phone]  Fax: [phone]    [facility]  [address] [place] [address]  Estela Angela (38 y.o. Female)       Date of Birth   1985    Social Security Number       Address   [address] [address]    Home Phone   [phone]    MRN   [id number]       Druze   None    Marital Status                               Admission Date   11/19/23    Admission Type   Elective    Admitting Provider   Clayton Mclaughlin III, MD    Attending Provider   Padmini Baxter MD    Department, Room/Bed   [facility] MOTHER BABY 4A, N420/1       Discharge Date       Discharge Disposition       Discharge Destination                                 Attending Provider: Padmini Baxter MD    Allergies: No Known Allergies    Isolation: None   Infection: None   Code Status: CPR    Ht: 162.6 cm (64\")   Wt: 72.6 kg (160 lb)    Admission Cmt: None   Principal Problem: Normal labor [O80,Z37.9]                   Active Insurance as of 11/19/2023       Primary Coverage       Payor Plan Insurance Group Employer/Plan Group    [employer] [id number]       Payor Plan Address Payor Plan Phone Number Payor Plan Fax Number Effective Dates    [address] [address]   1/1/2021 - None Entered    [facility] [id number]         Subscriber Name Subscriber Birth Date Member ID       ESTELA ANGELA JESS 1985 [id number]                     Emergency Contacts        (Rel.) Home Phone Work Phone Mobile Phone    Chaparro Angela (Father) -- -- [phone]              Insurance Information                  [employer] Phone: --    Subscriber: Julio CésarEstela gee Subscriber#: [id number]    Group#: [id number] Precert#: --             History & Physical        Padmini Baxter MD at 11/19/23 [address] [place] [address]  Obstetric History and " Physical    Chief Complaint   Patient presents with    Contractions       Subjective    Patient is a 38 y.o. female  currently at 38w1d, who presents for term labor  with ROM.  Her contractions became regular and painful around 0000 today.  She had SROM once admitted to L&D.  Meconium stained fluid noted.      Her prenatal care is complicated by maternal Grave's disease.  She is managed by endocrinology.  She has had 2 previous CS.  She strongly desires TOLAC.      The following portions of the patients history were reviewed and updated as appropriate: current medications, allergies, past medical history, past surgical history, past family history, past social history, and problem list .       Prenatal Information:   Maternal Prenatal Labs  Blood Type ABO Type   Date Value Ref Range Status   2023 O  Final      Rh Status RH type   Date Value Ref Range Status   2023 Positive  Final      Antibody Screen Antibody Screen   Date Value Ref Range Status   2023 Negative  Final                        Past OB History:       OB History    Para Term  AB Living   3 2 1 1 0 2   SAB IAB Ectopic Molar Multiple Live Births   0 0 0 0 0 2      # Outcome Date GA Lbr Darwin/2nd Weight Sex Delivery Anes PTL Lv   3 Current            2 Term 18 40w1d  3751 g (8 lb 4.3 oz) M CS-LTranv Spinal N MIKE      Name: Americo      Apgar1: 6  Apgar5: 8   1  16 35w6d  2653 g (5 lb 13.6 oz) M CS-LTranv Spinal N MIKE      Name: Av      Apgar1: 6  Apgar5: 8      Obstetric Comments   Pt states G1 CS r/t fetal intolerance on NST in office. Child is autistic.   G2 CS was r/t failure to go into labor on her own before scheduled RCS.       Past Medical History: Past Medical History:   Diagnosis Date    Abnormal Pap smear of cervix     Chlamydia     Graves disease     HPV (human papilloma virus) infection     Urinary tract infection       Past Surgical History Past Surgical History:   Procedure Laterality  Date     SECTION Bilateral 2016    Procedure:  SECTION PRIMARY;  Surgeon: Julissa Fuentes MD;  Location: [facility] LABOR DELIVERY;  Service:      SECTION N/A 2018    Procedure:  SECTION REPEAT *39WKS*;  Surgeon: Padmini Baxter MD;  Location: [facility] LABOR DELIVERY;  Service:     WISDOM TOOTH EXTRACTION        Family History: Family History   Problem Relation Age of Onset    Diabetes Mother     Hypertension Mother     Hyperlipidemia Mother     Other Father         MVA    No Known Problems Sister     No Known Problems Brother     Diabetes Maternal Grandmother     No Known Problems Maternal Grandfather     Stroke Paternal Grandfather     No Known Problems Son         11 months old      Social History:  reports that she has never smoked. She has never used smokeless tobacco.   reports no history of alcohol use.   reports no history of drug use.        REVIEW OF SYSTEMS              Reports fetal movement is normal             Reports leakage of amniotic fluid.             Denies vaginal bleeding                           All other systems reviewed and are negative    Objective      Vital Signs Range for the last 24 hours  Temperature: Temp:  [97.6 °F (36.4 °C)-98.8 °F (37.1 °C)] 98.8 °F (37.1 °C)   Temp Source: Temp src: Oral   BP: BP: ()/(48-82) 100/58   Pulse: Heart Rate:  [] 127   Respirations: Resp:  [20] 20   SPO2:     O2 Amount (l/min):     O2 Devices Device (Oxygen Therapy): room air   Weight: Weight:  [72.6 kg (160 lb)] 72.6 kg (160 lb)       Presentation: vtx   Cervix: Exam by: Method: sterile exam per RN   Dilation: Cervical Dilation (cm): 4   Effacement: Cervical Effacement: %   Station: Fetal Station: -1        Fetal Heart Rate Assessment   Method: Fetal HR Assessment Method: external   Beats/min: Fetal HR (beats/min): 140   Baseline: Fetal HR Baseline: normal range   Variability: Fetal HR Variability: moderate (amplitude range 6 to 25 bpm)   Accels:  Fetal HR Accelerations: greater than/equal to 15 bpm, lasting at least 15 seconds   Decels: Fetal HR Decelerations: early   Tracing Category:  1     Uterine Assessment   Method: Method: external tocotransducer   Frequency (min): Contraction Frequency (Minutes): 6   Ctx Count in 10 min:     Duration:     Intensity: Contraction Intensity: moderate by palpation   Intensity by IUPC:     Resting Tone: Uterine Resting Tone: soft by palpation   Resting Tone by IUPC:     Caleb Units:       Constitutional:  Well developed, well nourished, no acute distress, well-groomed.   Neurologic:  Alert & oriented x 3,  no focal deficits noted.   Psychiatric:  Speech and behavior appropriate.   Extremities: no cyanosis, clubbing or edema, no evidence of DVT.        Labs:  Lab Results   Component Value Date    WBC 12.40 (H) 2023    HGB 13.3 2023    HCT 39.7 2023    MCV 86.1 2023     2023    POCGLU 115 2017    CREATININE 0.60 2023    URICACID 4.1 2023    AST 16 2023    ALT 16 2023     2023     Results from last 7 days   Lab Units 23  1330   ABO TYPING  O   RH TYPING  Positive   ANTIBODY SCREEN  Negative           Assessment & Plan      Normal labor        Assessment:  1.  Intrauterine pregnancy at 38w1d weeks gestation with reactive, reassuring fetal status.    2.   term labor  with ROM  3.  Obstetrical history significant for  maternal Grave's disease managed by endocrinology .  Was hyperthyroid and on methimazole prior to pregnancy.  She has had 2 previous CS. She strongly desires TOLAC.  Has been extensively counseled on risks of uterine rupture and potential detrimental maternal and  outcomes.  4.  GBS status: neg    Plan:  1.Expectant management.  Discussed pitocin augmentation prn inadequate contractions or lack of cervical change.  She will consider.  Discussed low threshold for RCS with NRFHT.  T Cat 1 currently.    2. Plan of care  has been reviewed with patient and questions answered.  3.  Risks, benefits of treatment plan have been discussed.  4.  All questions have been answered.        Padmini Baxter MD  11/19/2023  16:37 EST     Electronically signed by Padmini Baxter MD at 11/19/23 1642       Maternal Vitals (last day)       Date/Time Temp Pulse Resp BP SpO2 Weight    11/20/23 0004 98.9 (37.2) 111 16 127/66 -- --    11/19/23 2300 -- 121 16 135/71 -- --    11/19/23 2243 -- 110 -- 137/76 -- --    11/19/23 2228 99 (37.2) 113 16 107/55 -- --    11/19/23 2043 -- -- -- 99/55 -- --    11/19/23 2028 -- 123 -- 92/54 -- --    11/19/23 2024 -- 127 -- 90/51 -- --    11/19/23 1943 -- 129 -- 86/50 -- --    11/19/23 1928 97.8 (36.6) 141 -- 97/58 -- --    11/19/23 1913 -- 126 -- 98/55 -- --    11/19/23 1858 -- -- -- 95/59 -- --    11/19/23 1843 97.8 (36.6) 136 -- 97/50 -- --    11/19/23 1828 -- 136 -- 102/59 -- --    11/19/23 1825 -- 105 -- 102/59 -- --    11/19/23 1824 -- 95 -- 100/55 -- --    11/19/23 1821 -- 86 -- 72/45 -- --    11/19/23 1813 -- 115 -- 85/54 -- --    11/19/23 1759 -- 116 -- 100/56 -- --    11/19/23 1744 -- 111 -- 110/60 -- --    11/19/23 1728 -- 112 -- 92/47 -- --    11/19/23 1716 -- 101 -- 103/52 -- --    11/19/23 1659 -- 115 -- 106/71 -- --    11/19/23 1644 -- 105 -- 102/68 -- --    11/19/23 1628 -- 118 -- 100/74 -- --    11/19/23 1614 -- 113 -- 114/65 -- --    11/19/23 1600 -- 127 -- 100/58 -- --    11/19/23 1543 98.8 (37.1) 112 -- 95/53 -- --    11/19/23 1529 -- 106 -- 103/56 -- --    11/19/23 1513 -- 111 -- 99/57 -- --    11/19/23 1459 -- 105 -- 98/55 -- --    11/19/23 1443 -- 133 -- 105/55 -- --    11/19/23 1440 -- 130 -- 111/56 -- --    11/19/23 1437 -- 123 -- 124/74 -- --    11/19/23 1434 -- 125 -- 102/52 -- --    11/19/23 1431 -- 120 -- 102/51 -- --    11/19/23 1428 -- 121 -- 95/48 -- --    11/19/23 1425 -- 112 -- 105/54 -- --    11/19/23 1422 -- 122 -- 117/57 -- --    11/19/23 1419 -- 118 -- 108/66 -- --    11/19/23  1416 -- 109 -- 117/67 -- --    23 1134 -- -- -- -- -- 72.6 (160)    23 1126 97.6 (36.4) 93 20 104/82 -- --           Padmini Baxter MD   Physician  OB/GYN     L&D Delivery Note     Addendum     Date of Service: 23  Creation Time: 23        [facility]   Vaginal Delivery Note     Patient Name: Estela Angela  : 1985  MRN: [id number]     Date of Delivery: 2023      Diagnosis      Pre & Post-Delivery:  Intrauterine pregnancy at 38w1d  Labor status: Spontaneous Onset of Labor     , delivered                  Problem List    Transfer to Postpartum     Review the Delivery Report for details.      Delivery      Delivery: Vaginal, Spontaneous     YOB: 2022    Time of Birth:  Gestational Age 10:15 PM   38w1d      Anesthesia: Epidural     Delivering clinician:  Padmini Baxter MD    Forceps?   Yes  Forcep Delivery   Forceps type:  Closed simpsons   Application location: low   Number of pulls:    Total application time:    Applied by: DR. FREY    Failed? No       Vacuum? No    Shoulder dystocia present: No                   Delivery narrative:  patient pushed with good effort x 60min.  Cat 2 FHT due to variable decelerations w/ pushing.  Discussed that due to previous CS x 2, expediting delivery recommended.  Discussed risks, benefits, alternatives to attempted FAVD vs RCS.  We discussed fetal and maternal risks.  Bladder drained w/ straight catheter.  Fetal presentation confirmed--OA.  Complete cervical dilation confirmed.  Adequate anesthesia w/ epidural confirmed.  Closed allan forceps applied w/o difficulty by Dr. Frey.  Excellent descent of fetal head noted over 2 contractions.  Forceps then removed.  FHT had completely recovered by this point, so the patient pushed without assistance for several more contractions.  The fetal head then delivered.  The anterior shoulder delivered with ease. The body delivered atraumatically.  "Infant vigorous and crying so placed on maternal abdomen. Delayed cord clamping x 2 min.  Second degree perineal and right periurethral laceration repaired in standard fashion w/ 3-0 vicryl rapide.  Uterine sweep performed due to earlier FHR decelerations, and no defect in previous hysterotomy noted.   Ancef 2g given for prophylaxis.         Infant      Findings: male  infant      Infant observations: Weight: 8#1oz  Length:   in  Observations/Comments:        Apgars: 8  @ 1 minute /     9  @ 5 minutes   Infant Name: Hiren      Placenta & Cord            Placenta delivered  Spontaneous  at   11/19/2023 10:20 PM     Cord:   present.   Nuchal Cord?  no   Cord blood obtained:    Cord gases obtained:             Cord gas results: Venous:  No results found for: \"PHCVEN\", \"BECVEN\"     Arterial:  No results found for: \"PHCART\", \"BECART\"      Repair      Episiotomy: Not recorded     No    Lacerations: Yes  Laceration Information  Laceration Repaired?   Perineal:  2nd yes    Periurethral:  right yes   Labial:      Sulcus:      Vaginal:      Cervical:         Suture used for repair: 3-0 Vicryl      Quantitative Blood Loss:  300mL            Complications      none     Disposition      Mother to Mother Baby/Postpartum  in stable condition currently.  Baby to remains with mom  in stable condition currently.     Padmini Bxater MD  11/19/23  22:36 EST                 Revision History    Routing History    Weight in grams is 3660g  "

## 2023-11-21 VITALS
DIASTOLIC BLOOD PRESSURE: 54 MMHG | HEIGHT: 64 IN | TEMPERATURE: 98.7 F | WEIGHT: 160 LBS | SYSTOLIC BLOOD PRESSURE: 108 MMHG | BODY MASS INDEX: 27.31 KG/M2 | HEART RATE: 73 BPM | RESPIRATION RATE: 18 BRPM

## 2023-11-21 RX ORDER — ACETAMINOPHEN 500 MG
1000 TABLET ORAL EVERY 6 HOURS PRN
Qty: 60 TABLET | Refills: 1 | Status: SHIPPED | OUTPATIENT
Start: 2023-11-21

## 2023-11-21 RX ADMIN — IBUPROFEN 600 MG: 600 TABLET, FILM COATED ORAL at 03:06

## 2023-11-21 RX ADMIN — ACETAMINOPHEN 650 MG: 325 TABLET ORAL at 08:33

## 2023-11-21 RX ADMIN — DOCUSATE SODIUM 100 MG: 100 CAPSULE, LIQUID FILLED ORAL at 08:34

## 2023-11-21 RX ADMIN — PRENATAL VITAMINS-IRON FUMARATE 27 MG IRON-FOLIC ACID 0.8 MG TABLET 1 TABLET: at 08:33

## 2023-11-21 NOTE — DISCHARGE SUMMARY
[facility]  Vaginal delivery discharge summary      Patient: Estela Angela      MR#:[id number]  Admission  Diagnosis:   , delivered     Discharge Diagnosis:  , delivered.      Date of Admission: 2023  Date of Discharge:  2023    Procedures:  Vaginal, Spontaneous     2023    10:15 PM      Service:  Obstetrics    Hospital Course:  Patient underwent vaginal delivery and remained in the hospital for 2 days.  During that time she remained afebrile and hemodynamically stable.  On the day of discharge, she was eating, ambulating and voiding without difficulty.  She is breastfeeding.     Labs:    Lab Results   Component Value Date    WBC 12.40 (H) 2023    HGB 11.2 (L) 2023    HCT 33.8 (L) 2023    MCV 86.1 2023     2023    POCGLU 115 2017    CREATININE 0.60 2023    URICACID 4.1 2023    AST 16 2023    ALT 16 2023     2023     Results from last 7 days   Lab Units 23  1330   ABO TYPING  O   RH TYPING  Positive   ANTIBODY SCREEN  Negative       Discharge Medications     Discharge Medications        New Medications        Instructions Start Date   acetaminophen 500 MG tablet  Commonly known as: TYLENOL   1,000 mg, Oral, Every 6 Hours PRN      ibuprofen 600 MG tablet  Commonly known as: ADVIL,MOTRIN   Take 1 tablet by mouth Every 6 (Six) Hours As Needed for Mild Pain      Stool Softener 100 MG capsule  Generic drug: docusate sodium   100 mg, Oral, 2 Times Daily             Changes to Medications        Instructions Start Date   methIMAzole 5 MG tablet  Commonly known as: TAPAZOLE  What changed: additional instructions   10 mg PO daily, for days per week.             Continue These Medications        Instructions Start Date   Prenatal 27-1 27-1 MG tablet tablet   Oral, Daily               Discharge Disposition:  To Home    Discharge Condition:  Stable    Discharge Diet: Regular    Activity at Discharge: Pelvic  rest    Follow-up Appointments  Follow up with LW in 6 weeks.     Connor Tapia CNM  11/21/23  08:17 EST

## 2023-11-21 NOTE — PROGRESS NOTES
Nutrition Education for Breastfeeding    Patient Name: Estela Angela  MRN: [id number]  Admission date: 11/19/2023    Education date: 11/21/23 10:31 EST    Reason for visit: Nutrition and Breastfeeding education    Topics Covered During Education:  I educated on nutrition and breastfeeding.  Written information was given and I went over the information.  I emphasized ensuring that she is taking in enough calories per day, up to 250-400 extra calories per day while breastfeeding.  I also went over staying hydrated and drinking non-caffeinated fluids.  I also went over following a balanced diet with fruits, vegetables, whole grains, and some source of protein.  Questions answered during education.    Written material given with name and contact number.      Kinsey Douglas, CRISTOBAL  10:31 EST  Time Spent:  20 minutes

## 2023-11-21 NOTE — PROGRESS NOTES
11/21/2023  PPD #2    Subjective   Estela feels well.  Patient describes her lochia as less than menses.  Pain is well controlled  She is breastfeeding.      Objective   Temp: Temp:  [98.4 °F (36.9 °C)-98.7 °F (37.1 °C)] 98.7 °F (37.1 °C) Temp src: Oral   BP: BP: ()/(43-54) 108/54        Pulse: Heart Rate:  [73-87] 73  RR: Resp:  [16-18] 18    General:  No acute distress   Abdomen: Fundus firm and beneath umbilicus   Pelvis: deferred     Lab Results   Component Value Date    WBC 12.40 (H) 11/19/2023    HGB 11.2 (L) 11/20/2023    HCT 33.8 (L) 11/20/2023    MCV 86.1 11/19/2023     11/19/2023    ABORH O Rh Positive 03/25/2016    RUBELLAIGGIN Immune 03/25/2016    HEPBSAG Non-Reactive 05/17/2023    AST 16 11/19/2023    ALT 16 11/19/2023    URICACID 4.1 11/19/2023       Assessment  PPD# 2 after vaginal delivery    Plan  Discharge to home  Follow up with LW  in 6 weeks  Prescriptions for Motrin, Colace, and Tylenol prescribed and advised on weaning.  Advised no tampons, intercourse, or tub baths for 6 weeks.       This note has been electronically signed.    Connor Tapia CNM  08:18 EST  November 21, 2023

## 2023-12-21 DIAGNOSIS — O99.280 HYPERTHYROIDISM DURING PREGNANCY: ICD-10-CM

## 2023-12-21 DIAGNOSIS — E05.00 GRAVES' DISEASE: ICD-10-CM

## 2023-12-21 DIAGNOSIS — E05.90 HYPERTHYROIDISM DURING PREGNANCY: ICD-10-CM

## 2023-12-21 RX ORDER — METHIMAZOLE 5 MG/1
10 TABLET ORAL
Qty: 40 TABLET | Refills: 5 | Status: SHIPPED | OUTPATIENT
Start: 2023-12-21

## 2024-01-17 ENCOUNTER — LAB (OUTPATIENT)
Dept: LAB | Facility: HOSPITAL | Age: 39
End: 2024-01-17
Payer: COMMERCIAL

## 2024-01-17 ENCOUNTER — OFFICE VISIT (OUTPATIENT)
Dept: ENDOCRINOLOGY | Facility: CLINIC | Age: 39
End: 2024-01-17
Payer: COMMERCIAL

## 2024-01-17 VITALS
DIASTOLIC BLOOD PRESSURE: 70 MMHG | WEIGHT: 148.2 LBS | HEART RATE: 64 BPM | OXYGEN SATURATION: 99 % | SYSTOLIC BLOOD PRESSURE: 112 MMHG | HEIGHT: 64 IN | BODY MASS INDEX: 25.3 KG/M2

## 2024-01-17 DIAGNOSIS — E05.00 GRAVES' DISEASE: Primary | ICD-10-CM

## 2024-01-17 LAB
T3 SERPL-MCNC: 92.6 NG/DL (ref 80–200)
T4 FREE SERPL-MCNC: 1.32 NG/DL (ref 0.93–1.7)
TSH SERPL DL<=0.05 MIU/L-ACNC: 0.09 UIU/ML (ref 0.27–4.2)

## 2024-01-17 PROCEDURE — 84439 ASSAY OF FREE THYROXINE: CPT | Performed by: INTERNAL MEDICINE

## 2024-01-17 PROCEDURE — 83520 IMMUNOASSAY QUANT NOS NONAB: CPT | Performed by: INTERNAL MEDICINE

## 2024-01-17 PROCEDURE — 84443 ASSAY THYROID STIM HORMONE: CPT | Performed by: INTERNAL MEDICINE

## 2024-01-17 PROCEDURE — 99213 OFFICE O/P EST LOW 20 MIN: CPT | Performed by: INTERNAL MEDICINE

## 2024-01-17 PROCEDURE — 84480 ASSAY TRIIODOTHYRONINE (T3): CPT | Performed by: INTERNAL MEDICINE

## 2024-01-17 PROCEDURE — 84445 ASSAY OF TSI GLOBULIN: CPT | Performed by: INTERNAL MEDICINE

## 2024-01-18 NOTE — PROGRESS NOTES
Chief Complaint   Patient presents with    Graves' Disease     HPI:   Estela Angela is a 38 y.o.female who [facility] to [facility] for f/u evaluation of graves disease. Last visit 2023. Her history is as follows:    Interim Events:  - Pt is 2 months post-partum. Is s/p  on 2023 w/o complication.   - Overall feeling well.  - Currently breastfeeding    1) Graves disease:   - first diagnosed in , by Dr. Merritt. Has been on thionamide therapy intermittently (mainly methimazole) since . Presented with palpitations, weight loss, and tremor.  - In 2016 had her first pregnancy (took PTU during that pregnancy). Postpartum was switched to methimazole.  - 18 months later, became pregnant with second child. Was on methimazole for a few weeks in the first trimester. Was switched to PTU. At 15 weeks, I switched to methimazole per [facility]IR[employer] guidelines.   - pt stopped the methimazole post-partum in 2018, TSH Rec Ab <0.50 in 2018  - restarted methimazole 5 mg daily in 2018: TSH <0.004, free T4 2.92 (0.89 - 1.76), T3 316  - had to increase her dose in 2019  - Patient was scheduled for thyroidectomy on 2019 [facility] with Dr. Shari Ambriz.  However as the surgery date approached, she decided not to pursue thyroidectomy.  Continued methimazole therapy    Current Dose: methimazole 10 mg (5 days per week)  - Denies missed doses  - Denies fever, sore throat, or pruritus  - Denies tremor, palpitations, diarrhea, rash/itching    Review of Systems   Constitutional:  Positive for fatigue (intermittent).        Expected weight loss   HENT: Negative.     Eyes: Negative.    Respiratory:  Negative for shortness of breath.    Cardiovascular:  Negative for palpitations.   Gastrointestinal: Negative.  Negative for diarrhea.   Endocrine: Negative for heat intolerance.   Genitourinary: Negative.    Musculoskeletal: Negative.    Skin: Negative.    Allergic/Immunologic: Negative.    Neurological:  Negative  "for tremors.   Hematological: Negative.    Psychiatric/Behavioral:  Positive for sleep disturbance. The patient is not nervous/anxious.        The following portions of the patient's history were reviewed and updated as appropriate: allergies, current medications, past family history, past medical history, past social history, past surgical history and problem list.      /70   Pulse 64   Ht 162.6 cm (64\")   Wt 67.2 kg (148 lb 3.2 oz)   SpO2 99%   BMI 25.44 kg/m²   Physical Exam   Constitutional: She is oriented to person, place, and time. She appears well-developed. No distress.   HENT:   Head: Normocephalic.   Eyes: Pupils are equal, round, and reactive to light. Conjunctivae are normal.   Neck: No tracheal deviation present. Thyromegaly (mild) present.   No palpable thyroid nodules     Cardiovascular: Normal rate, regular rhythm and normal heart sounds.   No murmur heard.  Pulmonary/Chest: Effort normal and breath sounds normal. No respiratory distress.   Abdominal: Soft. Bowel sounds are normal. There is no abdominal tenderness.   Lymphadenopathy:     She has no cervical adenopathy.   Neurological: She is alert and oriented to person, place, and time. No cranial nerve deficit.   No Tremor in hands   Skin: Skin is warm and dry. She is not diaphoretic. No erythema.   Psychiatric: Her behavior is normal.   Vitals reviewed.      LABS/IMAGING: outside records reviewed and summarized in HPI  Results for orders placed or performed in visit on 01/17/24   TSH    Specimen: Blood   Result Value Ref Range    TSH 0.093 (L) 0.270 - 4.200 uIU/mL   T4, Free    Specimen: Blood   Result Value Ref Range    Free T4 1.32 0.93 - 1.70 ng/dL   T3    Specimen: Blood   Result Value Ref Range    T3, Total 92.6 80.0 - 200.0 ng/dl     TSI, TSH Receptor Ab levels pending    ASSESSMENT/PLAN:  1) Graves disease:  Is currently 2 months post-partum. Clinically euthyroid on exam  TFT's checked today. TSH suppressed, with free T4 and " total T3 normal.  Continue methimazole to 10 mg (5 days per week)  Graves antibodies checked today and results pending  Will check TFTs at her f/u. Pt to contact me if she notes any worsening symptoms.     RTC 5 months    Electronically Signed: Vidya Sousa MD

## 2024-01-19 LAB
TSH RECEP AB SER-ACNC: <1.1 IU/L (ref 0–1.75)
TSI SER-ACNC: 0.2 IU/L (ref 0–0.55)

## 2024-02-20 DIAGNOSIS — E05.90 HYPERTHYROIDISM DURING PREGNANCY: ICD-10-CM

## 2024-02-20 DIAGNOSIS — O99.280 HYPERTHYROIDISM DURING PREGNANCY: ICD-10-CM

## 2024-02-20 DIAGNOSIS — E05.00 GRAVES' DISEASE: ICD-10-CM

## 2024-02-20 RX ORDER — METHIMAZOLE 5 MG/1
10 TABLET ORAL
Qty: 40 TABLET | Refills: 5 | Status: SHIPPED | OUTPATIENT
Start: 2024-02-20

## 2024-04-22 DIAGNOSIS — E05.00 GRAVES' DISEASE: Primary | ICD-10-CM

## 2024-04-24 ENCOUNTER — LAB (OUTPATIENT)
Dept: LAB | Facility: HOSPITAL | Age: 39
End: 2024-04-24
Payer: COMMERCIAL

## 2024-04-24 DIAGNOSIS — E05.00 GRAVES' DISEASE: ICD-10-CM

## 2024-04-24 LAB
T3 SERPL-MCNC: 577 NG/DL (ref 80–200)
TSH SERPL DL<=0.05 MIU/L-ACNC: <0.005 UIU/ML (ref 0.27–4.2)

## 2024-04-24 PROCEDURE — 84443 ASSAY THYROID STIM HORMONE: CPT

## 2024-04-24 PROCEDURE — 84439 ASSAY OF FREE THYROXINE: CPT

## 2024-04-24 PROCEDURE — 84480 ASSAY TRIIODOTHYRONINE (T3): CPT

## 2024-04-25 ENCOUNTER — TELEPHONE (OUTPATIENT)
Dept: ENDOCRINOLOGY | Facility: CLINIC | Age: 39
End: 2024-04-25
Payer: COMMERCIAL

## 2024-04-25 DIAGNOSIS — E05.00 GRAVES' DISEASE: Primary | ICD-10-CM

## 2024-04-25 LAB — T4 FREE SERPL-MCNC: >7.77 NG/DL (ref 0.93–1.7)

## 2024-04-25 RX ORDER — METHIMAZOLE 10 MG/1
20 TABLET ORAL DAILY
Qty: 180 TABLET | Refills: 1 | Status: SHIPPED | OUTPATIENT
Start: 2024-04-25 | End: 2025-04-25

## 2024-04-25 NOTE — TELEPHONE ENCOUNTER
Spoke to patient about lab results. Pt had URI at the beginning of April resulting in worsening Graves disease. Increased her methimazole to 20 mg daily. Pt reported her pulse was okay an beta blocker not initiated.   Electronically Signed: Vidya Sousa MD

## 2024-05-22 DIAGNOSIS — E05.00 GRAVES' DISEASE: Primary | ICD-10-CM

## 2024-05-22 RX ORDER — PROPRANOLOL HYDROCHLORIDE 10 MG/1
10 TABLET ORAL 3 TIMES DAILY
Qty: 90 TABLET | Refills: 3 | Status: SHIPPED | OUTPATIENT
Start: 2024-05-22

## 2024-06-12 ENCOUNTER — OFFICE VISIT (OUTPATIENT)
Dept: ENDOCRINOLOGY | Facility: CLINIC | Age: 39
End: 2024-06-12
Payer: COMMERCIAL

## 2024-06-12 VITALS
HEART RATE: 86 BPM | OXYGEN SATURATION: 96 % | HEIGHT: 64 IN | BODY MASS INDEX: 22.84 KG/M2 | WEIGHT: 133.8 LBS | SYSTOLIC BLOOD PRESSURE: 116 MMHG | DIASTOLIC BLOOD PRESSURE: 68 MMHG

## 2024-06-12 DIAGNOSIS — E05.00 GRAVES' DISEASE: Primary | ICD-10-CM

## 2024-06-12 LAB
T3 SERPL-MCNC: 518 NG/DL (ref 80–200)
T4 FREE SERPL-MCNC: 5.64 NG/DL (ref 0.92–1.68)

## 2024-06-12 PROCEDURE — 84439 ASSAY OF FREE THYROXINE: CPT | Performed by: INTERNAL MEDICINE

## 2024-06-12 PROCEDURE — 84480 ASSAY TRIIODOTHYRONINE (T3): CPT | Performed by: INTERNAL MEDICINE

## 2024-06-12 NOTE — PROGRESS NOTES
Chief Complaint   Patient presents with    Graves' Disease     Follow-up     HPI:   Estela Angela is a 38 y.o.female who returns to [facility] for f/u evaluation of graves disease. Last visit 01/17/2024. Her history is as follows:    Interim Events:  - pt contacted me in 04/2024 for worsening hyperthyroid symptoms. TSH <0.005, free T4 was > 7.77. Methimazole was increased to 20 mg daily. Propranolol initiated.  - Pt is 6 months post-partum.    - Overall feeling well.  - Currently breastfeeding    1) Graves disease:   - first diagnosed in 2013, by Dr. Merritt. Has been on thionamide therapy intermittently (mainly methimazole) since 2013. Presented with palpitations, weight loss, and tremor.  - In 09/2016 had her first pregnancy (took PTU during that pregnancy). Postpartum was switched to methimazole.  - 18 months later, became pregnant with second child. Was on methimazole for a few weeks in the first trimester. Was switched to PTU. At 15 weeks, I switched to methimazole per [facility] guidelines.   - pt stopped the methimazole post-partum in 02/07/2018, TSH Rec Ab <0.50 in 03/2018  - restarted methimazole 5 mg daily in 08/2018: TSH <0.004, free T4 2.92 (0.89 - 1.76), T3 316  - had to increase her dose in 02/2019  - Patient was scheduled for thyroidectomy on October 1, 2019 [facility] with Dr. Shari Ambriz.  However as the surgery date approached, she decided not to pursue thyroidectomy.  Continued methimazole therapy    Current Rxs: methimazole 20 mg, propranolol 10 mg TID   - Denies missed doses  - Denies fever, sore throat, or pruritus  - + palpitations, mood changes, weight loss    Review of Systems   Constitutional:  Positive for fatigue.        Weight loss   HENT: Negative.     Eyes: Negative.    Respiratory:  Negative for cough and shortness of breath.    Cardiovascular:  Positive for palpitations.   Gastrointestinal: Negative.  Negative for diarrhea.   Endocrine: Negative for heat intolerance.   Genitourinary:  "Negative.    Musculoskeletal: Negative.    Skin: Negative.    Allergic/Immunologic: Negative.    Neurological:  Negative for tremors.   Hematological: Negative.    Psychiatric/Behavioral:  Positive for sleep disturbance.         Mood changes       The following portions of the patient's history were reviewed and updated as appropriate: allergies, current medications, past family history, past medical history, past social history, past surgical history and problem list.      /68   Pulse 86   Ht 162.6 cm (64.02\")   Wt 60.7 kg (133 lb 12.8 oz)   SpO2 96%   BMI 22.96 kg/m²   Physical Exam   Constitutional: She is oriented to person, place, and time. She appears well-developed. No distress.   HENT:   Head: Normocephalic.   Eyes: Pupils are equal, round, and reactive to light. Conjunctivae are normal.   Neck: No tracheal deviation present. Thyromegaly (moderate 60 gm gland) present.   No palpable thyroid nodules     Cardiovascular: Normal rate, regular rhythm and normal heart sounds.   No murmur heard.  Pulmonary/Chest: Effort normal and breath sounds normal. No respiratory distress.   Abdominal: Soft. Bowel sounds are normal. There is no abdominal tenderness.   Lymphadenopathy:     She has no cervical adenopathy.   Neurological: She is alert and oriented to person, place, and time. No cranial nerve deficit.   No Tremor in hands   Skin: Skin is warm and dry. She is not diaphoretic. No erythema.   Psychiatric: Her behavior is normal.   Vitals reviewed.      LABS/IMAGING: outside records reviewed and summarized in HPI  Results for orders placed or performed in visit on 06/12/24   T3    Specimen: Arm, Left; Blood   Result Value Ref Range    T3, Total 518.0 (H) 80.0 - 200.0 ng/dl   T4, Free    Specimen: Arm, Left; Blood   Result Value Ref Range    Free T4 5.64 (H) 0.92 - 1.68 ng/dL       ASSESSMENT/PLAN:  1) Graves disease:  Is currently 6 months post-partum. Clinically hyperthyroid on exam  free T4, T3 slowly " improving since 04/2024 after recent increase in methimazole.  Continue methimazole 20 mg   Increase propranolol to 20 mg TID  Will check TFTs at her f/u. Pt to contact me if she notes any worsening symptoms.     RTC 4 months    Electronically Signed: Vidya Sousa MD

## 2024-06-13 DIAGNOSIS — E05.00 GRAVES' DISEASE: Primary | ICD-10-CM

## 2024-06-13 RX ORDER — PROPRANOLOL HYDROCHLORIDE 20 MG/1
20 TABLET ORAL 3 TIMES DAILY
Qty: 90 TABLET | Refills: 5 | Status: SHIPPED | OUTPATIENT
Start: 2024-06-13

## 2024-07-05 ENCOUNTER — OFFICE VISIT (OUTPATIENT)
Dept: INTERNAL MEDICINE | Facility: CLINIC | Age: 39
End: 2024-07-05
Payer: COMMERCIAL

## 2024-07-05 VITALS
HEART RATE: 68 BPM | HEIGHT: 64 IN | TEMPERATURE: 97.7 F | BODY MASS INDEX: 23.35 KG/M2 | WEIGHT: 136.8 LBS | RESPIRATION RATE: 18 BRPM | DIASTOLIC BLOOD PRESSURE: 66 MMHG | SYSTOLIC BLOOD PRESSURE: 106 MMHG | OXYGEN SATURATION: 98 %

## 2024-07-05 DIAGNOSIS — F98.8 ATTENTION DEFICIT DISORDER (ADD) WITHOUT HYPERACTIVITY: Primary | ICD-10-CM

## 2024-07-05 DIAGNOSIS — E05.00 GRAVES' DISEASE: ICD-10-CM

## 2024-07-05 PROCEDURE — 1159F MED LIST DOCD IN RCRD: CPT | Performed by: NURSE PRACTITIONER

## 2024-07-05 PROCEDURE — 99213 OFFICE O/P EST LOW 20 MIN: CPT | Performed by: NURSE PRACTITIONER

## 2024-07-05 PROCEDURE — 1126F AMNT PAIN NOTED NONE PRSNT: CPT | Performed by: NURSE PRACTITIONER

## 2024-07-05 PROCEDURE — 1160F RVW MEDS BY RX/DR IN RCRD: CPT | Performed by: NURSE PRACTITIONER

## 2024-07-05 RX ORDER — PROPRANOLOL HYDROCHLORIDE 10 MG/1
10 TABLET ORAL 3 TIMES DAILY
COMMUNITY
Start: 2024-07-02

## 2024-07-05 NOTE — PROGRESS NOTES
Subjective   Estela Angela is a 38 y.o. female here to establish care.  Chief Complaint   Patient presents with    Establish [facility]    ADD     Pt states she has a hard time focusing at home and at work        ADD    /Review of Systems  History of Present Illness  The patient presents for evaluation of ADD    The patient was previously diagnosed with Attention Deficit Disorder (ADD) in , under the care of a psychiatrist. Currently, she is experiencing significant memory lapses, difficulty initiating tasks, and difficulty multitasking. 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  She reports she is procrastinating has a lot of difficulty focusing and completing things in a timely manner.  She denies hyperactivity.she is interested in starting medications.     The patient is currently under the care of an endocrinologist, Dr. Sousa, for Graves' disease. She is currently on methimazole and propranolol. Her recent laboratory results indicated an elevated T4 level. She is currently breastfeeding.    The patient is due for a Pap smear.       Results  Laboratory Studies  T4 was still up.       The following portions of the patient's history were reviewed and updated as appropriate: allergies, current medications, past family history, past medical history, past social history, past surgical history and problem list.          No Known Allergies  Past Medical History:   Diagnosis Date    Abnormal Pap smear of cervix     Chlamydia     Graves disease     HPV (human papilloma virus) infection     Urinary tract infection      Past Surgical History:   Procedure Laterality Date     SECTION Bilateral 2016    Procedure:  SECTION PRIMARY;  Surgeon: Julissa Fuentes MD;  Location: [facility] LABOR DELIVERY;  Service:      SECTION N/A 2018    Procedure:  SECTION REPEAT *39WKS*;  Surgeon: Padmini Baxter MD;  Location: [facility] LABOR DELIVERY;  Service:      "WISDOM TOOTH EXTRACTION       Family History   Problem Relation Age of Onset    Diabetes Mother     Hypertension Mother     Hyperlipidemia Mother     Other Father         MVA    No Known Problems Sister     No Known Problems Brother     Diabetes Maternal Grandmother     No Known Problems Maternal Grandfather     Stroke Paternal Grandfather     No Known Problems Son         11 months old     Social History     Socioeconomic History    Marital status:      Spouse name: Shay Gomez    Number of children: 2   Tobacco Use    Smoking status: Never    Smokeless tobacco: Never   Vaping Use    Vaping status: Never Used   Substance and Sexual Activity    Alcohol use: No    Drug use: No    Sexual activity: Yes     Partners: Male     Birth control/protection: None     Immunization History   Administered Date(s) Administered    COVID-19 (MODERNA) 1st,2nd,3rd Dose Monovalent 05/25/2021, 06/22/2021    Flu Vaccine Quad PF >36MO 09/13/2017    HPV Quadrivalent 11/28/2011, 02/27/2012    Tdap 11/14/2022       Current Outpatient Medications:     methIMAzole (TAPAZOLE) 10 MG tablet, Take 2 tablets by mouth Daily., Disp: 180 tablet, Rfl: 1    propranolol (INDERAL) 10 MG tablet, Take 1 tablet by mouth 3 (Three) Times a Day., Disp: , Rfl:     Objective   Blood pressure 106/66, pulse 68, temperature 97.7 °F (36.5 °C), temperature source Infrared, resp. rate 18, height 162.6 cm (64.02\"), weight 62.1 kg (136 lb 12.8 oz), SpO2 98%, currently breastfeeding.  Physical Exam  Constitutional:       Appearance: Normal appearance. She is well-developed.   HENT:      Head: Normocephalic and atraumatic.   Eyes:      General: No scleral icterus.     Conjunctiva/sclera: Conjunctivae normal.   Cardiovascular:      Rate and Rhythm: Normal rate and regular rhythm.      Heart sounds: Normal heart sounds.   Pulmonary:      Effort: Pulmonary effort is normal. No respiratory distress.      Breath sounds: Normal breath sounds.   Abdominal:      General: " Bowel sounds are normal.      Palpations: Abdomen is soft.      Tenderness: There is no abdominal tenderness.   Musculoskeletal:         General: Normal range of motion.      Cervical back: Normal range of motion.      Right lower leg: No edema.      Left lower leg: No edema.   Skin:     General: Skin is warm and dry.   Neurological:      General: No focal deficit present.      Mental Status: She is alert and oriented to person, place, and time.   Psychiatric:         Attention and Perception: Attention normal.         Mood and Affect: Mood and affect normal.         Behavior: Behavior normal. Behavior is cooperative.         Thought Content: Thought content normal.         Cognition and Memory: Cognition normal.         Judgment: Judgment normal.           Assessment & Plan   Diagnoses and all orders for this visit:    1. Attention deficit disorder (ADD) without hyperactivity (Primary)  -     Ambulatory Referral to Behavioral Health    2. Graves' disease      Assessment & Plan  1. Attention deficit hyperactivity disorder.  A referral to a psychiatrist to behavioral health has been initiated as I am unable to prescribe most of the ADD medications.    2. Graves' disease.  TSH still elevated but she is still breast-feeding she follows with Dr. Sousa.  They are managing her methimazole and her propranolol.  She will continue to follow with them routinely.  3. Health maintenance.  An annual examination has been scheduled.           Return for Annual.  Plan of care discussed with pt. They verbalized understanding and agreement.     Patient or patient representative verbalized consent for the use of Ambient Listening during the visit with  SHARON Aparicio for chart documentation. 7/5/2024  13:14 EDT

## 2024-07-09 ENCOUNTER — PATIENT ROUNDING (BHMG ONLY) (OUTPATIENT)
Dept: INTERNAL MEDICINE | Facility: CLINIC | Age: 39
End: 2024-07-09
Payer: COMMERCIAL

## 2024-07-09 NOTE — PROGRESS NOTES
My name is Anastasia Casillas and I am a patient experience representative for [facility] on [facility].    I would like to officially welcome you to our practice.  If you do not mind I would like to ask you a few questions about your recent visit with us.  If you do not have the time to reply that is perfectly fine.      First, could you tell me what went well with your recent visit?     Secondly, we are always looking for ways to make our patients' experiences even better. Do you have any recommendations on ways we may improve?     Third, safety is a top priority therefore do you have any concerns with that while in our office?    Finally, overall were you satisfied with your first visit to our practice?     Thank you for taking the time to answer a few questions today.  In the coming days you will receive a survey.  We encourage you to complete the survey to let us know how we did!        Anastasia

## 2024-07-23 ENCOUNTER — OFFICE VISIT (OUTPATIENT)
Dept: BEHAVIORAL HEALTH | Facility: CLINIC | Age: 39
End: 2024-07-23
Payer: COMMERCIAL

## 2024-07-23 VITALS
BODY MASS INDEX: 23.46 KG/M2 | HEIGHT: 64 IN | SYSTOLIC BLOOD PRESSURE: 118 MMHG | HEART RATE: 95 BPM | OXYGEN SATURATION: 98 % | WEIGHT: 137.4 LBS | DIASTOLIC BLOOD PRESSURE: 66 MMHG

## 2024-07-23 DIAGNOSIS — F33.1 MAJOR DEPRESSIVE DISORDER, RECURRENT EPISODE, MODERATE: ICD-10-CM

## 2024-07-23 DIAGNOSIS — F90.2 ATTENTION DEFICIT HYPERACTIVITY DISORDER, COMBINED TYPE: Primary | ICD-10-CM

## 2024-07-23 RX ORDER — ATOMOXETINE 25 MG/1
25 CAPSULE ORAL DAILY
Qty: 30 CAPSULE | Refills: 0 | Status: SHIPPED | OUTPATIENT
Start: 2024-07-23

## 2024-07-24 PROBLEM — F33.1 MAJOR DEPRESSIVE DISORDER, RECURRENT EPISODE, MODERATE: Status: ACTIVE | Noted: 2024-07-24

## 2024-08-05 ENCOUNTER — OFFICE VISIT (OUTPATIENT)
Dept: BEHAVIORAL HEALTH | Facility: CLINIC | Age: 39
End: 2024-08-05
Payer: COMMERCIAL

## 2024-08-05 VITALS
WEIGHT: 138 LBS | SYSTOLIC BLOOD PRESSURE: 118 MMHG | DIASTOLIC BLOOD PRESSURE: 74 MMHG | HEIGHT: 64 IN | OXYGEN SATURATION: 99 % | BODY MASS INDEX: 23.56 KG/M2 | HEART RATE: 88 BPM

## 2024-08-05 DIAGNOSIS — F90.2 ATTENTION DEFICIT HYPERACTIVITY DISORDER, COMBINED TYPE: ICD-10-CM

## 2024-08-05 PROCEDURE — 1159F MED LIST DOCD IN RCRD: CPT | Performed by: REGISTERED NURSE

## 2024-08-05 PROCEDURE — 1160F RVW MEDS BY RX/DR IN RCRD: CPT | Performed by: REGISTERED NURSE

## 2024-08-05 PROCEDURE — 99214 OFFICE O/P EST MOD 30 MIN: CPT | Performed by: REGISTERED NURSE

## 2024-08-05 RX ORDER — ATOMOXETINE 25 MG/1
25 CAPSULE ORAL DAILY
Qty: 30 CAPSULE | Refills: 0 | Status: SHIPPED | OUTPATIENT
Start: 2024-08-05

## 2024-08-05 NOTE — PROGRESS NOTES
Follow Up Office Visit      Patient Name: Estela Angela  : 1985   MRN: [id number]     Referring Provider: Hanna De La Vega APRN    Chief Complaint:      ICD-10-CM ICD-9-CM   1. Attention deficit hyperactivity disorder, combined type  F90.2 314.01        History of Present Illness:   Estela Angela is a 38 y.o. female who is here today for follow up and medication management    Subjective      Patient Reports: that she has been taking the atomoxetine, and she has noticed a small change. She reports that she is not as down as before, and has some more energy. She reports that the only side effect that she has noticed is some dry mouth. She reports that she started to take the ADHD online exam but has not finished it. Denies SI/HI/AVH    Review of Systems:   Review of Systems   Constitutional:  Negative for appetite change and unexpected weight change.   Eyes:  Negative for visual disturbance.   Respiratory:  Negative for chest tightness and shortness of breath.    Cardiovascular:  Negative for chest pain.   Musculoskeletal:  Negative for gait problem.   Skin:  Negative for rash and wound.   Neurological:  Negative for dizziness, tremors, seizures, weakness and light-headedness.   Psychiatric/Behavioral:  Positive for decreased concentration. Negative for agitation, behavioral problems, confusion, hallucinations, self-injury, sleep disturbance and suicidal ideas. The patient is not hyperactive.      Sleep pattern: no changes  Appetite: maybe a tiny bit decreased.     PHQ-9 Depression Screening  Little interest or pleasure in doing things? 1-->several days   Feeling down, depressed, or hopeless? 1-->several days   Trouble falling or staying asleep, or sleeping too much? 1-->several days   Feeling tired or having little energy? 1-->several days   Poor appetite or overeating? 0-->not at all   Feeling bad about yourself - or that you are a failure or have let yourself or your family down? 1-->several  days   Trouble concentrating on things, such as reading the newspaper or watching television? 1-->several days   Moving or speaking so slowly that other people could have noticed? Or the opposite - being so fidgety or restless that you have been moving around a lot more than usual? 0-->not at all   Thoughts that you would be better off dead, or of hurting yourself in some way? 0-->not at all   PHQ-9 Total Score 6   If you checked off any problems, how difficult have these problems made it for you to do your work, take care of things at home, or get along with other people? somewhat difficult     DAX-7 Anxiety Screening  Over the last two weeks, how often have you been bothered by the following problems?  Feeling nervous, anxious or on edge: Several days  Not being able to stop or control worrying: Several days  Worrying too much about different things: Several days  Trouble Relaxing: Several days  Being so restless that it is hard to sit still: Not at all  Becoming easily annoyed or irritable: More than half the days  Feeling afraid as if something awful might happen: Several days  DAX 7 Total Score: 7  If you checked any problems, how difficult have these problems made it for you to do your work, take care of things at home, or get along with other people: Somewhat difficult    RISK ASSESSMENT:  Patient denies any thoughts or intent of suicide today. Patient denies any impulsive behavior today.     Medications:     Current Outpatient Medications:     atomoxetine (Strattera) 25 MG capsule, Take 1 capsule by mouth Daily., Disp: 30 capsule, Rfl: 0    methIMAzole (TAPAZOLE) 10 MG tablet, Take 2 tablets by mouth Daily., Disp: 180 tablet, Rfl: 1    propranolol (INDERAL) 10 MG tablet, Take 1 tablet by mouth 3 (Three) Times a Day., Disp: , Rfl:     Medication Considerations:  GENET reviewed and appropriate.      Allergies:   No Known Allergies      Objective     Physical Exam:  Vital Signs:   Vitals:    08/05/24 1459  "  BP: 118/74   Pulse: 88   SpO2: 99%   Weight: 62.6 kg (138 lb)   Height: 162.6 cm (64.02\")     Body mass index is 23.68 kg/m².     Mental Status Exam:   Hygiene:   good  Cooperation:  Cooperative  Eye Contact:  Good  Psychomotor Behavior:  Appropriate  Affect:  Appropriate  Mood: normal  Speech:  Normal  Thought Process:  Goal directed and Linear  Thought Content:  Normal  Suicidal:  None  Homicidal:  None  Hallucinations:  None  Delusion:  None  Memory:  Intact  Orientation:  Person, Place, Time, and Situation  Reliability:  good  Insight:  Good  Judgement:  Good  Impulse Control:  Good  Physical/Medical Issues:   see problem list      Assessment / Plan      Visit Diagnosis/Orders Placed This Visit:  Diagnoses and all orders for this visit:    1. Attention deficit hyperactivity disorder, combined type  -     atomoxetine (Strattera) 25 MG capsule; Take 1 capsule by mouth Daily.  Dispense: 30 capsule; Refill: 0         Functional Status: No impairment    Prognosis: Good with Ongoing Treatment     Impression/Formulation:  Patient appeared alert and oriented.  Patient is voluntarily requesting to continue outpatient psychiatric treatment at [facility].  Patient is receptive to assistance with maintaining a stable lifestyle.  Patient presents with history of     ICD-10-CM ICD-9-CM   1. Attention deficit hyperactivity disorder, combined type  F90.2 314.01     Reviewed patient's previous provider notes. Reviewed most recent labs. Patient meets DSM V diagnostic criteria for diagnoses. Diagnoses may be updated as more information becomes available.       Treatment Plan:   Continue atomoxetine 25 mg daily  Complete ADHD online exam  Resume individual therapy  Gave patient pregnancy/lactation registry for ADHD [facility] phone #[phone]  Follow up in 1 month or sooner if needed  Patient will continue supportive psychotherapy efforts and medications as indicated. Clinic will obtain release of information " for current treatment team for continuity of care as needed. Patient will contact this office, call 911 or present to the nearest emergency room should suicidal or homicidal ideations occur.  Discussed medication options and treatment plan of prescribed medication(s) as well as the risks, benefits, and potential side effects. Patient acknowledged and verbally consented to continue with current treatment plan and was educated on the importance of compliance with treatment and follow-up appointments.     Patient instructions:  Instructed will take 4-6 weeks for full therapeutic effect. Medication risks and side effects discussed with patient including risk for worsening mood, changes in behavior, thoughts of suicide or homicide, induction of rk, serotonin syndrome.   If any thoughts of SI or HI, worsening mood or changes in behavior, call 911 or crisis line [phone], or go to nearest ER at once. Pt.verbalizes understanding and consents to treatment with this medication.     Follow Up:   Return in about 4 weeks (around 9/2/2024) for Med Check.        SHARON Mike, PMHNP-BC  [facility]

## 2024-09-04 ENCOUNTER — OFFICE VISIT (OUTPATIENT)
Dept: BEHAVIORAL HEALTH | Facility: CLINIC | Age: 39
End: 2024-09-04
Payer: COMMERCIAL

## 2024-09-04 VITALS
BODY MASS INDEX: 23.22 KG/M2 | SYSTOLIC BLOOD PRESSURE: 126 MMHG | OXYGEN SATURATION: 97 % | DIASTOLIC BLOOD PRESSURE: 86 MMHG | HEART RATE: 86 BPM | HEIGHT: 64 IN | WEIGHT: 136 LBS

## 2024-09-04 DIAGNOSIS — F90.2 ATTENTION DEFICIT HYPERACTIVITY DISORDER, COMBINED TYPE: Primary | ICD-10-CM

## 2024-09-04 DIAGNOSIS — F33.1 MAJOR DEPRESSIVE DISORDER, RECURRENT EPISODE, MODERATE: ICD-10-CM

## 2024-09-04 PROCEDURE — 1160F RVW MEDS BY RX/DR IN RCRD: CPT | Performed by: REGISTERED NURSE

## 2024-09-04 PROCEDURE — 1159F MED LIST DOCD IN RCRD: CPT | Performed by: REGISTERED NURSE

## 2024-09-04 PROCEDURE — 99214 OFFICE O/P EST MOD 30 MIN: CPT | Performed by: REGISTERED NURSE

## 2024-09-04 RX ORDER — ATOMOXETINE 40 MG/1
40 CAPSULE ORAL DAILY
Qty: 30 CAPSULE | Refills: 0 | Status: SHIPPED | OUTPATIENT
Start: 2024-09-04

## 2024-09-04 NOTE — PROGRESS NOTES
Follow Up Office Visit      Patient Name: Estela Angela  : 1985   MRN: [id number]     Referring Provider: Hanna De La Vega APRN    Chief Complaint:      ICD-10-CM ICD-9-CM   1. Attention deficit hyperactivity disorder, combined type  F90.2 314.01   2. Major depressive disorder, recurrent episode, moderate  F33.1 296.32        History of Present Illness:   Estela Angela is a 39 y.o. female who is here today for follow up and medication management    Subjective      Patient Reports:   History of Present Illness  The patient presents for evaluation of anxiety.    She is currently on a regimen of Strattera 25 mg, which she reports has been beneficial this week. She expressed interest in exploring a higher dosage during our last conversation. Her overall condition has improved, with no recent episodes of severe anxiety or existential crises.    She continues to take propranolol 10 mg three times daily for her Graves' disease, which was prescribed due to heart palpitations. Initially, she attempted to manage her symptoms without medication, but difficulties with tasks such as climbing stairs led her to start the medication. She plans to discontinue propranolol once her thyroid levels stabilize postpartum.    Her sleep pattern is generally good, although she acknowledges the need to limit late-night phone use. She feels well-rested and not overly fatigued during the day. Her appetite remains stable, and she has not observed any negative effects from her current medications. She experienced headaches at the onset of her treatment, but these have since resolved.    She is currently breastfeeding her 10-month-old child and intends to continue until the child reaches one year of age.    Denies SI/HI/AVH    Review of Systems:   Review of Systems   Constitutional:  Negative for appetite change and unexpected weight change.   Eyes:  Negative for visual disturbance.   Respiratory:  Negative for chest tightness  and shortness of breath.    Cardiovascular:  Negative for chest pain.   Musculoskeletal:  Negative for gait problem.   Skin:  Negative for rash and wound.   Neurological:  Negative for dizziness, tremors, seizures, weakness and light-headedness.   Psychiatric/Behavioral:  Positive for decreased concentration. Negative for agitation, behavioral problems, confusion, hallucinations, self-injury, sleep disturbance and suicidal ideas. The patient is not hyperactive.      Sleep pattern: fine with sleep  Appetite: no changes     PHQ-9 Depression Screening  Little interest or pleasure in doing things? 0-->not at all   Feeling down, depressed, or hopeless? 0-->not at all   Trouble falling or staying asleep, or sleeping too much? 1-->several days   Feeling tired or having little energy? 1-->several days   Poor appetite or overeating? 0-->not at all   Feeling bad about yourself - or that you are a failure or have let yourself or your family down? 1-->several days   Trouble concentrating on things, such as reading the newspaper or watching television? 0-->not at all   Moving or speaking so slowly that other people could have noticed? Or the opposite - being so fidgety or restless that you have been moving around a lot more than usual? 0-->not at all   Thoughts that you would be better off dead, or of hurting yourself in some way? 0-->not at all   PHQ-9 Total Score 3   If you checked off any problems, how difficult have these problems made it for you to do your work, take care of things at home, or get along with other people? somewhat difficult     DAX-7 Anxiety Screening  Over the last two weeks, how often have you been bothered by the following problems?  Feeling nervous, anxious or on edge: Several days  Not being able to stop or control worrying: Not at all  Worrying too much about different things: Not at all  Trouble Relaxing: Several days  Being so restless that it is hard to sit still: Not at all  Becoming easily  "annoyed or irritable: Not at all  Feeling afraid as if something awful might happen: Not at all  DAX 7 Total Score: 2  If you checked any problems, how difficult have these problems made it for you to do your work, take care of things at home, or get along with other people: Somewhat difficult    RISK ASSESSMENT:  Patient denies any thoughts or intent of suicide today. Patient denies any impulsive behavior today.     Medications:     Current Outpatient Medications:     atomoxetine (Strattera) 40 MG capsule, Take 1 capsule by mouth Daily., Disp: 30 capsule, Rfl: 0    methIMAzole (TAPAZOLE) 10 MG tablet, Take 2 tablets by mouth Daily., Disp: 180 tablet, Rfl: 1    propranolol (INDERAL) 10 MG tablet, Take 1 tablet by mouth 3 (Three) Times a Day., Disp: , Rfl:     Medication Considerations:  GENET reviewed and appropriate.      Allergies:   No Known Allergies        Objective     Physical Exam:  Vital Signs:   Vitals:    09/04/24 1009   BP: 126/86   Pulse: 86   SpO2: 97%   Weight: 61.7 kg (136 lb)   Height: 162.6 cm (64.02\")     Body mass index is 23.33 kg/m².     Mental Status Exam:   Hygiene:   good   Cooperation:  Cooperative  Eye Contact:  Good  Psychomotor Behavior:  Appropriate  Affect:  Appropriate  Mood: normal  Speech:  Normal  Thought Process:  Goal directed and Linear  Thought Content:  Normal  Suicidal:  None  Homicidal:  None  Hallucinations:  None  Delusion:  None  Memory:  Intact  Orientation:  Person, Place, Time, and Situation  Reliability:  good  Insight:  Good  Judgement:  Good  Impulse Control:  Good  Physical/Medical Issues:   see problem list      Assessment / Plan      Visit Diagnosis/Orders Placed This Visit:  Diagnoses and all orders for this visit:    1. Attention deficit hyperactivity disorder, combined type (Primary)  -     atomoxetine (Strattera) 40 MG capsule; Take 1 capsule by mouth Daily.  Dispense: 30 capsule; Refill: 0    2. Major depressive disorder, recurrent episode, moderate     "         Functional Status: No impairment    Prognosis: Good with Ongoing Treatment     Impression/Formulation:  Patient appeared alert and oriented.  Patient is voluntarily requesting to continue outpatient psychiatric treatment at [facility].  Patient is receptive to assistance with maintaining a stable lifestyle.  Patient presents with history of     ICD-10-CM ICD-9-CM   1. Attention deficit hyperactivity disorder, combined type  F90.2 314.01   2. Major depressive disorder, recurrent episode, moderate  F33.1 296.32     Reviewed patient's previous provider notes. Reviewed most recent labs. Patient meets DSM V diagnostic criteria for diagnoses. Diagnoses may be updated as more information becomes available.     Assessment & Plan  1. Anxiety.  The patient's symptoms and presentation are consistent with anxiety. She reports feeling better on Strattera 25 mg but wishes to try a higher dose. The dosage of Strattera will be increased from 25 mg to 40 mg, to be taken in the morning. A prescription for the adjusted dosage has been sent to [facility]. If the increased dosage of Strattera proves ineffective or causes adverse effects such as headaches or stomachaches, a return to the 25 mg dosage may be considered. She is advised to hold on to the remaining 25 mg tablets until the efficacy of the 40 mg dose is determined.      Follow-up  A follow-up appointment is scheduled for 1 month from now or sooner if needed.    Treatment Plan:   Increase strattera to 40 mg daily      Patient will continue supportive psychotherapy efforts and medications as indicated. Clinic will obtain release of information for current treatment team for continuity of care as needed. Patient will contact this office, call 911 or present to the nearest emergency room should suicidal or homicidal ideations occur.  Discussed medication options and treatment plan of prescribed medication(s) as well as the risks, benefits, and  potential side effects. Patient acknowledged and verbally consented to continue with current treatment plan and was educated on the importance of compliance with treatment and follow-up appointments.     Patient instructions:  Medication risks and side effects discussed with patient including risk for worsening mood, changes in behavior, thoughts of suicide or homicide, induction of rk, serotonin syndrome.   If any thoughts of SI or HI, worsening mood or changes in behavior, call 911 or crisis line [phone], or go to nearest ER at once. Pt.verbalizes understanding and consents to treatment with this medication.     Follow Up:   Return in about 1 month (around 10/4/2024) for Med Check.    Patient or patient representative verbalized consent for the use of Ambient Listening during the visit with  SHARON Mike for chart documentation. 9/4/2024  10:37 EDT    SHARON Mike, PMP-BC [facility]

## 2024-09-25 ENCOUNTER — OFFICE VISIT (OUTPATIENT)
Dept: BEHAVIORAL HEALTH | Facility: CLINIC | Age: 39
End: 2024-09-25
Payer: COMMERCIAL

## 2024-09-25 VITALS
HEIGHT: 64 IN | OXYGEN SATURATION: 98 % | HEART RATE: 95 BPM | DIASTOLIC BLOOD PRESSURE: 76 MMHG | BODY MASS INDEX: 22.53 KG/M2 | WEIGHT: 132 LBS | SYSTOLIC BLOOD PRESSURE: 114 MMHG

## 2024-09-25 DIAGNOSIS — F33.1 MAJOR DEPRESSIVE DISORDER, RECURRENT EPISODE, MODERATE: ICD-10-CM

## 2024-09-25 DIAGNOSIS — F90.2 ATTENTION DEFICIT HYPERACTIVITY DISORDER, COMBINED TYPE: Primary | ICD-10-CM

## 2024-09-25 PROCEDURE — 1160F RVW MEDS BY RX/DR IN RCRD: CPT | Performed by: REGISTERED NURSE

## 2024-09-25 PROCEDURE — 96127 BRIEF EMOTIONAL/BEHAV ASSMT: CPT | Performed by: REGISTERED NURSE

## 2024-09-25 PROCEDURE — 1159F MED LIST DOCD IN RCRD: CPT | Performed by: REGISTERED NURSE

## 2024-09-25 PROCEDURE — 99214 OFFICE O/P EST MOD 30 MIN: CPT | Performed by: REGISTERED NURSE

## 2024-09-25 RX ORDER — ATOMOXETINE 25 MG/1
25 CAPSULE ORAL DAILY
Qty: 30 CAPSULE | Refills: 1 | Status: SHIPPED | OUTPATIENT
Start: 2024-09-25

## 2024-11-12 ENCOUNTER — OFFICE VISIT (OUTPATIENT)
Dept: ENDOCRINOLOGY | Facility: CLINIC | Age: 39
End: 2024-11-12
Payer: COMMERCIAL

## 2024-11-12 VITALS
BODY MASS INDEX: 22.4 KG/M2 | HEIGHT: 64 IN | SYSTOLIC BLOOD PRESSURE: 110 MMHG | DIASTOLIC BLOOD PRESSURE: 72 MMHG | WEIGHT: 131.2 LBS | HEART RATE: 92 BPM | OXYGEN SATURATION: 98 %

## 2024-11-12 DIAGNOSIS — E05.00 GRAVES' DISEASE: Primary | ICD-10-CM

## 2024-11-12 PROCEDURE — 84443 ASSAY THYROID STIM HORMONE: CPT | Performed by: INTERNAL MEDICINE

## 2024-11-12 PROCEDURE — 84480 ASSAY TRIIODOTHYRONINE (T3): CPT | Performed by: INTERNAL MEDICINE

## 2024-11-12 PROCEDURE — 99213 OFFICE O/P EST LOW 20 MIN: CPT | Performed by: INTERNAL MEDICINE

## 2024-11-12 PROCEDURE — 84439 ASSAY OF FREE THYROXINE: CPT | Performed by: INTERNAL MEDICINE

## 2024-11-12 RX ORDER — PROPRANOLOL HCL 20 MG
20 TABLET ORAL 2 TIMES DAILY
COMMUNITY
Start: 2024-10-25 | End: 2024-11-12

## 2024-11-12 RX ORDER — PROPRANOLOL HCL 20 MG
20 TABLET ORAL 2 TIMES DAILY
Start: 2024-11-12

## 2024-11-12 NOTE — PROGRESS NOTES
Chief Complaint   Patient presents with    Graves' Disease     Follow-up     HPI:   Estela Angela is a 39 y.o.female who [facility]u evaluation of graves disease. Last visit 06/12/2024. Her history is as follows:    Interim Events:  - Overall feeling better  - Currently breastfeeding    1) Graves disease:   - first diagnosed in 2013, by Dr. Merritt. Has been on thionamide therapy intermittently (mainly methimazole) since 2013. Presented with palpitations, weight loss, and tremor.  - In 09/2016 had her first pregnancy (took PTU during that pregnancy). Postpartum was switched to methimazole.  - 18 months later, became pregnant with second child. Was on methimazole for a few weeks in the first trimester. Was switched to PTU. At 15 weeks, I switched to methimazole per [facility] guidelines.   - pt stopped the methimazole post-partum in 02/07/2018, TSH Rec Ab <0.50 in 03/2018  - restarted methimazole 5 mg daily in 08/2018: TSH <0.004, free T4 2.92 (0.89 - 1.76), T3 316  - had to increase her dose in 02/2019  - Patient was scheduled for thyroidectomy on October 1, 2019 [facility] with Dr. Shari Ambriz.  However as the surgery date approached, she decided not to pursue thyroidectomy.  Continued methimazole therapy    Current Rxs: methimazole 20 mg, propranolol 20 mg per day   - Denies missed doses  - Denies fever, sore throat, or pruritus  -  occasional palpitations, weight loss    Review of Systems   Constitutional:  Negative for fatigue.        Weight stable   HENT: Negative.     Eyes: Negative.    Respiratory:  Negative for cough and shortness of breath.    Cardiovascular:  Positive for palpitations (occasional).   Gastrointestinal: Negative.  Negative for diarrhea.   Endocrine: Negative for heat intolerance.   Genitourinary: Negative.    Musculoskeletal: Negative.    Skin: Negative.    Allergic/Immunologic: Negative.    Neurological:  Negative for tremors.   Hematological: Negative.   "  Psychiatric/Behavioral: Negative.  Negative for sleep disturbance.        The following portions of the patient's history were reviewed and updated as appropriate: allergies, current medications, past family history, past medical history, past social history, past surgical history and problem list.    /72   Pulse 92   Ht 162.6 cm (64.02\")   Wt 59.5 kg (131 lb 3.2 oz)   SpO2 98%   BMI 22.51 kg/m²   Physical Exam   Constitutional: She is oriented to person, place, and time. She appears well-developed. No distress.   HENT:   Head: Normocephalic.   Eyes: Pupils are equal, round, and reactive to light. Conjunctivae are normal.   Neck: No tracheal deviation present. Thyromegaly (moderate 40-50 gm gland) present.   No palpable thyroid nodules     Cardiovascular: Normal rate, regular rhythm and normal heart sounds.   No murmur heard.  Pulmonary/Chest: Effort normal and breath sounds normal. No respiratory distress.   Abdominal: Soft. Bowel sounds are normal. There is no abdominal tenderness.   Lymphadenopathy:     She has no cervical adenopathy.   Neurological: She is alert and oriented to person, place, and time. No cranial nerve deficit.   No Tremor in hands   Skin: Skin is warm and dry. She is not diaphoretic. No erythema.   Psychiatric: Her behavior is normal.   Vitals reviewed.      LABS/IMAGING: outside records reviewed and summarized in HPI  Results for orders placed or performed in visit on 11/12/24   TSH    Collection Time: 11/12/24 12:02 PM    Specimen: Arm, Left; Blood   Result Value Ref Range    TSH <0.005 (L) 0.270 - 4.200 uIU/mL   T4, Free    Collection Time: 11/12/24 12:02 PM    Specimen: Arm, Left; Blood   Result Value Ref Range    Free T4 2.78 (H) 0.92 - 1.68 ng/dL   T3    Collection Time: 11/12/24 12:02 PM    Specimen: Arm, Left; Blood   Result Value Ref Range    T3, Total 192.0 80.0 - 200.0 ng/dl     Lab Results   Component Value Date    GLUCOSE 94 08/21/2023    BUN 7 08/21/2023    CREATININE " 0.60 11/19/2023     08/21/2023    K 3.6 08/21/2023     08/21/2023    CALCIUM 9.2 08/21/2023    PROTEINTOT 6.2 08/21/2023    ALBUMIN 3.5 08/21/2023    ALT 16 11/19/2023    AST 16 11/19/2023    ALKPHOS 164 (H) 11/19/2023    BILITOT 0.4 11/19/2023    GLOB 2.7 08/21/2023    AGRATIO 1.3 08/21/2023    BCR 15.2 08/21/2023    ANIONGAP 15.0 08/21/2023    EGFR 126.6 08/21/2023       ASSESSMENT/PLAN:  1) Graves disease:  Clinically improving slowly. TFT's show Free t4 is lower, total T3 is now in the normal range  - We have discussed the risks versus benefits of treatment options for hyperthyroidism consisting of thionamide therapy versus radioactive iodine versus surgery. Discussed the rate of recurrence  vs remission rate with thionamide therapy and need for life-long thyroid hormone supplementation with both TRUONG ablation and surgery. Pt would like to stay on methimazole.   - Continue methimazole 20 mg daily  - Continue propranolol to 20 mg per day  - Will check TFTs, CMP, CBC at her f/u. Pt to contact me if she notes any worsening symptoms.     RTC 4 months    Electronically Signed: Vidya Sousa MD

## 2024-11-13 LAB
T3 SERPL-MCNC: 192 NG/DL (ref 80–200)
T4 FREE SERPL-MCNC: 2.78 NG/DL (ref 0.92–1.68)
TSH SERPL DL<=0.05 MIU/L-ACNC: <0.005 UIU/ML (ref 0.27–4.2)

## 2024-11-21 ENCOUNTER — OFFICE VISIT (OUTPATIENT)
Dept: BEHAVIORAL HEALTH | Facility: CLINIC | Age: 39
End: 2024-11-21
Payer: COMMERCIAL

## 2024-11-21 VITALS
BODY MASS INDEX: 22.16 KG/M2 | DIASTOLIC BLOOD PRESSURE: 72 MMHG | HEART RATE: 72 BPM | OXYGEN SATURATION: 99 % | WEIGHT: 129.8 LBS | SYSTOLIC BLOOD PRESSURE: 114 MMHG | HEIGHT: 64 IN

## 2024-11-21 DIAGNOSIS — F33.1 MAJOR DEPRESSIVE DISORDER, RECURRENT EPISODE, MODERATE: Primary | ICD-10-CM

## 2024-11-21 DIAGNOSIS — F90.2 ATTENTION DEFICIT HYPERACTIVITY DISORDER, COMBINED TYPE: ICD-10-CM

## 2024-11-21 RX ORDER — ATOMOXETINE 25 MG/1
25 CAPSULE ORAL DAILY
Qty: 90 CAPSULE | Refills: 0 | Status: SHIPPED | OUTPATIENT
Start: 2024-11-21

## 2024-11-21 RX ORDER — ATOMOXETINE 25 MG/1
25 CAPSULE ORAL DAILY
Qty: 30 CAPSULE | Refills: 1 | Status: SHIPPED | OUTPATIENT
Start: 2024-11-21 | End: 2024-11-21

## 2024-11-21 NOTE — PROGRESS NOTES
Follow Up Office Visit      Patient Name: Estela Angela  : 1985   MRN: [id number]     Referring Provider: Hanna De La Vega APRN    Chief Complaint:      ICD-10-CM ICD-9-CM   1. Major depressive disorder, recurrent episode, moderate  F33.1 296.32   2. Attention deficit hyperactivity disorder, combined type  F90.2 314.01        History of Present Illness:   Estela Angela is a 39 y.o. female who is here today for follow up and medication management    Subjective      Patient Reports:   History of Present Illness  The patient presents for evaluation of depression.    She reports a significant improvement in her condition, attributing it to the birth of her child a year ago. Initially, she experienced a lack of motivation, even for routine tasks such as cleaning her house. She felt overwhelmed by her responsibilities and struggled with low self-esteem. However, she now feels more in control and motivated, although she acknowledges that there are still areas for improvement.    She is currently using AI software to help manage her daily activities. She reports feeling well-rested and maintains a consistent eating schedule. Her appetite remains unaffected, and she only experiences sleep disturbances when anxious about her tasks.    She reports no side effects from her medication, except for occasional headaches. She continues to breastfeed and does not endorse any suicidal ideation or hallucinations.    Review of Systems:   Review of Systems  Sleep pattern: well rested  Appetite: no changes     PHQ-9 Depression Screening  Little interest or pleasure in doing things? Not at all   Feeling down, depressed, or hopeless? Not at all   PHQ-2 Total Score 0   Trouble falling or staying asleep, or sleeping too much? Over half   Feeling tired or having little energy? Not at all   Poor appetite or overeating? Not at all   Feeling bad about yourself - or that you are a failure or have let yourself or your family  down? Not at all   Trouble concentrating on things, such as reading the newspaper or watching television? Not at all   Moving or speaking so slowly that other people could have noticed? Or the opposite - being so fidgety or restless that you have been moving around a lot more than usual? Not at all   Thoughts that you would be better off dead, or of hurting yourself in some way? Not at all   PHQ-9 Total Score 2   If you checked off any problems, how difficult have these problems made it for you to do your work, take care of things at home, or get along with other people? Somewhat difficult       DAX-7 Anxiety Screening  Over the last two weeks, how often have you been bothered by the following problems?  Feeling nervous, anxious or on edge: Several days  Not being able to stop or control worrying: Several days  Worrying too much about different things: Several days  Trouble Relaxing: Several days  Being so restless that it is hard to sit still: Several days  Becoming easily annoyed or irritable: More than half the days  Feeling afraid as if something awful might happen: Not at all  ADX 7 Total Score: 7  If you checked any problems, how difficult have these problems made it for you to do your work, take care of things at home, or get along with other people: Somewhat difficult    RISK ASSESSMENT:  Patient denies any thoughts or intent of suicide today. Patient denies any impulsive behavior today.     Medications:     Current Outpatient Medications:     methIMAzole (TAPAZOLE) 10 MG tablet, Take 2 tablets by mouth Daily., Disp: 180 tablet, Rfl: 1    propranolol (INDERAL) 20 MG tablet, Take 1 tablet by mouth 2 (Two) Times a Day., Disp: , Rfl:     atomoxetine (Strattera) 25 MG capsule, Take 1 capsule by mouth Daily., Disp: 90 capsule, Rfl: 0    Medication Considerations:  GENET reviewed and appropriate.      Allergies:   No Known Allergies       Objective     Physical Exam:  Vital Signs:   Vitals:    11/21/24 1059  "11/21/24 1101   BP:  114/72   Pulse:  72   SpO2:  99%   Weight: 58.9 kg (129 lb 12.8 oz)    Height: 162.6 cm (64.02\")      Body mass index is 22.27 kg/m².     Mental Status Exam:   Hygiene:   good   Cooperation:  Cooperative  Eye Contact:  Good  Psychomotor Behavior:  Appropriate  Affect:  Appropriate  Mood: normal  Speech:  Normal  Thought Process:  Goal directed and Linear  Thought Content:  Normal  Suicidal:  None  Homicidal:  None  Hallucinations:  None  Delusion:  None  Memory:  Intact  Orientation:  Person, Place, Time, and Situation  Reliability:  good  Insight:  Good  Judgement:  Good  Impulse Control:  Good  Physical/Medical Issues:   see problem list      Assessment / Plan      Visit Diagnosis/Orders Placed This Visit:  Diagnoses and all orders for this visit:    1. Major depressive disorder, recurrent episode, moderate (Primary)    2. Attention deficit hyperactivity disorder, combined type  -     Discontinue: atomoxetine (Strattera) 25 MG capsule; Take 1 capsule by mouth Daily.  Dispense: 30 capsule; Refill: 1  -     atomoxetine (Strattera) 25 MG capsule; Take 1 capsule by mouth Daily.  Dispense: 90 capsule; Refill: 0             Functional Status: No impairment    Prognosis: Good with Ongoing Treatment     Impression/Formulation:  Patient appeared alert and oriented.  Patient is voluntarily requesting to continue outpatient psychiatric treatment at [facility].  Patient is receptive to assistance with maintaining a stable lifestyle.  Patient presents with history of     ICD-10-CM ICD-9-CM   1. Major depressive disorder, recurrent episode, moderate  F33.1 296.32   2. Attention deficit hyperactivity disorder, combined type  F90.2 314.01     Reviewed patient's previous provider notes. Reviewed most recent labs. Patient meets DSM V diagnostic criteria for diagnoses. Diagnoses may be updated as more information becomes available.     Assessment & Plan  1. Depression.  The patient " reports significant improvement in motivation and overall well-being since starting the medication. She no longer feels overwhelmed by daily tasks and has regained her motivation. She is tolerating the medication well without significant side effects, except for occasional headaches initially. There are no changes in appetite or sleep disturbances attributed to the medication. She continues to breastfeed without issues. A 90-day supply of her current medication will be provided and sent to [facility]. If needed, she can opt for telehealth visits.    Follow-up  Return in 3 months for follow up or sooner if needed.    Treatment Plan:   Continue atomoxetine 25 mg daily      Patient will continue supportive psychotherapy efforts and medications as indicated. Clinic will obtain release of information for current treatment team for continuity of care as needed. Patient will contact this office, call 911 or present to the nearest emergency room should suicidal or homicidal ideations occur.  Discussed medication options and treatment plan of prescribed medication(s) as well as the risks, benefits, and potential side effects. Patient acknowledged and verbally consented to continue with current treatment plan and was educated on the importance of compliance with treatment and follow-up appointments.     Patient instructions:  Medication risks and side effects discussed with patient including risk for worsening mood, changes in behavior, thoughts of suicide or homicide, induction of rk, serotonin syndrome.   If any thoughts of SI or HI, worsening mood or changes in behavior, call 911 or crisis line [phone], or go to nearest ER at once. Pt.verbalizes understanding and consents to treatment with this medication.     Follow Up:   Return in about 3 months (around 2/21/2025) for Med Check.    Patient or patient representative verbalized consent for the use of Ambient Listening during the visit with  Joan Benítez  SHARON for chart documentation. 11/21/2024  11:22 SHARON Gonzales, PMHNP-BC [facility]

## 2024-11-24 RX ORDER — METHIMAZOLE 10 MG/1
20 TABLET ORAL DAILY
Qty: 180 TABLET | Refills: 1 | Status: SHIPPED | OUTPATIENT
Start: 2024-11-24

## 2025-02-20 ENCOUNTER — TELEMEDICINE (OUTPATIENT)
Dept: BEHAVIORAL HEALTH | Facility: CLINIC | Age: 40
End: 2025-02-20
Payer: COMMERCIAL

## 2025-02-20 DIAGNOSIS — F90.2 ATTENTION DEFICIT HYPERACTIVITY DISORDER, COMBINED TYPE: Primary | ICD-10-CM

## 2025-02-20 DIAGNOSIS — F33.1 MAJOR DEPRESSIVE DISORDER, RECURRENT EPISODE, MODERATE: ICD-10-CM

## 2025-02-20 RX ORDER — ATOMOXETINE 25 MG/1
25 CAPSULE ORAL DAILY
Qty: 90 CAPSULE | Refills: 1 | Status: SHIPPED | OUTPATIENT
Start: 2025-02-20

## 2025-02-20 NOTE — PROGRESS NOTES
Video Visit      Patient Name: Estela Angela  : 1985   MRN: [id number]     Referring Provider: Hanna De La Vega APRN    Chief Complaint:      ICD-10-CM ICD-9-CM   1. Attention deficit hyperactivity disorder, combined type  F90.2 314.01   2. Major depressive disorder, recurrent episode, moderate  F33.1 296.32        This provider is located at the [facility] (through [facility]), [address] [place] [address] using a secure [employer] Video Visit through [employer]. Patient is being seen remotely via telehealth video visit in [place], and stated they are in a secure environment for this session. The patient's condition being diagnosed/treated is appropriate for telemedicine. The provider identified herself as well as her credentials. The patient, and/or patients guardian, consent to be seen remotely, and when consent is given they understand that the consent allows for patient identifiable information to be sent to a third party as needed. They may refuse to be seen remotely at any time. The electronic data is encrypted and password protected, and the patient and/or guardian has been advised of the potential risks to privacy not withstanding such measures.    The patient has chosen to receive care today through a telehealth video visit. Do you consent to use a video/audio connection for your medical care today? Yes    Mode of Visit: Video  Location of patient: -HOME-  Location of provider: +[facility]+  You have chosen to receive care through a telehealth visit.  The patient has signed the video visit consent form.  The visit included audio and video interaction. No technical issues occurred during this visit.      History of Present Illness:   Estela Angela is a 39 y.o. female who is being seen by a video visit today for follow up and medication management.   History of Present Illness  The patient presents for evaluation of ADHD.    She has been on a  consistent regimen of Strattera 25 mg, which she reports as being highly effective in managing her symptoms. Initially, she experienced headaches as a side effect, but these have since resolved. She expresses satisfaction with the medication, noting a significant improvement in her condition.  Her sleep pattern is generally good, provided she limits her phone usage at night, and she wakes up feeling refreshed. She reports no changes in her appetite. She does not endorse any suicidal ideation or thoughts of harming others. Additionally, she reports no auditory or visual hallucinations.         Subjective      Review of Systems:   Review of Systems   Constitutional:  Negative for appetite change and unexpected weight change.   Eyes:  Negative for visual disturbance.   Respiratory:  Negative for chest tightness and shortness of breath.    Cardiovascular:  Negative for chest pain.   Musculoskeletal:  Negative for gait problem.   Skin:  Negative for rash and wound.   Neurological:  Negative for dizziness, tremors, seizures, weakness and light-headedness.   Psychiatric/Behavioral:  Negative for agitation, behavioral problems, confusion, decreased concentration, dysphoric mood, hallucinations, self-injury, sleep disturbance and suicidal ideas. The patient is not nervous/anxious and is not hyperactive.      Sleep pattern: sleep fine, well rested  Appetite:no changes     PHQ-9 Depression Screening  Little interest or pleasure in doing things? Not at all   Feeling down, depressed, or hopeless? Not at all   PHQ-2 Total Score 0   Trouble falling or staying asleep, or sleeping too much?     Feeling tired or having little energy?     Poor appetite or overeating?     Feeling bad about yourself - or that you are a failure or have let yourself or your family down?     Trouble concentrating on things, such as reading the newspaper or watching television?     Moving or speaking so slowly that other people could have noticed? Or the  opposite - being so fidgety or restless that you have been moving around a lot more than usual?     Thoughts that you would be better off dead, or of hurting yourself in some way?     PHQ-9 Total Score     If you checked off any problems, how difficult have these problems made it for you to do your work, take care of things at home, or get along with other people?         DAX-7 Anxiety Screening  Over the last two weeks, how often have you been bothered by the following problems?  Feeling nervous, anxious or on edge: Not at all  Not being able to stop or control worrying: Not at all  Worrying too much about different things: Not at all  Trouble Relaxing: Not at all  Being so restless that it is hard to sit still: Not at all  Becoming easily annoyed or irritable: Not at all  Feeling afraid as if something awful might happen: Not at all  DAX 7 Total Score: 0  If you checked any problems, how difficult have these problems made it for you to do your work, take care of things at home, or get along with other people: Not difficult at all    RISK ASSESSMENT:  Patient denies any thoughts of suicide or intent today. Patient denies any suicidal or homicidal ideation today. Patient denies any high risk factors today.     Medications:     Current Outpatient Medications:     atomoxetine (Strattera) 25 MG capsule, Take 1 capsule by mouth Daily., Disp: 90 capsule, Rfl: 1    methIMAzole (TAPAZOLE) 10 MG tablet, Take 2 tablets by mouth Daily., Disp: 180 tablet, Rfl: 1    propranolol (INDERAL) 20 MG tablet, Take 1 tablet by mouth 2 (Two) Times a Day., Disp: , Rfl:     Medication Considerations:  GENET reviewed and appropriate.      Allergies:   No Known Allergies         Objective     Physical Exam:  Vital Signs: There were no vitals filed for this visit.  There is no height or weight on file to calculate BMI.   Due to telehealth appointment    Mental Status Exam:   Hygiene:   good  Cooperation:  Cooperative  Eye Contact:   Good  Psychomotor Behavior:  Appropriate  Affect:  Appropriate  Mood: normal  Speech:  Normal  Thought Process:  Goal directed and Linear  Thought Content:  Normal  Suicidal:  None  Homicidal:  None  Hallucinations:  None  Delusion:  None  Memory:  Intact  Orientation:  Person, Place, Time, and Situation  Reliability:  good  Insight:  Good  Judgement:  Good  Impulse Control:  Good  Physical/Medical Issues:   see problem list      Assessment / Plan      Visit Diagnosis/Orders Placed This Visit:  Diagnoses and all orders for this visit:    1. Attention deficit hyperactivity disorder, combined type (Primary)  -     atomoxetine (Strattera) 25 MG capsule; Take 1 capsule by mouth Daily.  Dispense: 90 capsule; Refill: 1    2. Major depressive disorder, recurrent episode, moderate             Functional Status: No impairment    Prognosis: Good with Ongoing Treatment     Impression/Formulation:  Patient appeared alert and oriented.  Patient is voluntarily requesting to continue outpatient psychiatric treatment at [facility].  Patient is receptive to assistance with maintaining a stable lifestyle.  Patient presents with history of     ICD-10-CM ICD-9-CM   1. Attention deficit hyperactivity disorder, combined type  F90.2 314.01   2. Major depressive disorder, recurrent episode, moderate  F33.1 296.32   Reviewed patient's previous provider notes. Reviewed most recent labs. Patient meets DSM V diagnostic criteria for diagnoses. Diagnoses may be updated as more information becomes available.     Assessment & Plan  Attention deficit hyperactivity disorder (ADHD).  She reports significant improvement in her symptoms with the current dosage of Strattera 25 mg, and no side effects are noted. She initially experienced headaches, but these have resolved. She is satisfied with the medication and has not had any issues with insurance coverage. A prescription for Strattera 25 mg will be issued for a duration of 90  days, with one refill available. She is advised to contact the office if any issues arise or if she wishes to consider increasing the dosage.    Follow-up  The patient is scheduled for a follow-up visit on 08/28/2025 at 10:00 AM or sooner if needed.       Treatment Plan:   Continue straterra 25 mg daily      Patient will continue supportive psychotherapy efforts and medications as indicated. Clinic will obtain release of information for current treatment team for continuity of care as needed. Patient will contact this office, call 911 or present to the nearest emergency room should suicidal or homicidal ideations occur. Discussed medication options and treatment plan of prescribed medication(s) as well as the risks, benefits, and potential side effects. Patient ackowledged and verbally consented to continue with current treatment plan and was educated on the importance of compliance with treatment and follow-up appointments.     Patient instructions:  Medication risks and side effects discussed with patient including risk for worsening mood, changes in behavior, thoughts of suicide or homicide, induction of rk, serotonin syndrome.   If any thoughts of SI or HI, worsening mood or changes in behavior, call 911 or crisis line [phone], or go to nearest ER at once. Pt.verbalizes understanding and consents to treatment with this medication.     Follow Up:   Return in about 6 months (around 8/20/2025) for Med Check.    Patient or patient representative verbalized consent for the use of Ambient Listening during the visit with  SHARON Mike for chart documentation. 2/20/2025  10:27 EST        SHARON Mike, PMHNP-BC [facility]

## 2025-03-19 ENCOUNTER — OFFICE VISIT (OUTPATIENT)
Dept: ENDOCRINOLOGY | Facility: CLINIC | Age: 40
End: 2025-03-19
Payer: COMMERCIAL

## 2025-03-19 VITALS
HEIGHT: 64 IN | WEIGHT: 144.2 LBS | HEART RATE: 78 BPM | SYSTOLIC BLOOD PRESSURE: 126 MMHG | OXYGEN SATURATION: 100 % | DIASTOLIC BLOOD PRESSURE: 72 MMHG | BODY MASS INDEX: 24.62 KG/M2

## 2025-03-19 DIAGNOSIS — E05.00 GRAVES' DISEASE: Primary | ICD-10-CM

## 2025-03-19 LAB
ALBUMIN SERPL-MCNC: 4.1 G/DL (ref 3.5–5.2)
ALBUMIN/GLOB SERPL: 1.4 G/DL
ALP SERPL-CCNC: 142 U/L (ref 39–117)
ALT SERPL W P-5'-P-CCNC: 12 U/L (ref 1–33)
ANION GAP SERPL CALCULATED.3IONS-SCNC: 10.9 MMOL/L (ref 5–15)
AST SERPL-CCNC: 13 U/L (ref 1–32)
BILIRUB SERPL-MCNC: <0.2 MG/DL (ref 0–1.2)
BUN SERPL-MCNC: 19 MG/DL (ref 6–20)
BUN/CREAT SERPL: 18.4 (ref 7–25)
CALCIUM SPEC-SCNC: 9.6 MG/DL (ref 8.6–10.5)
CHLORIDE SERPL-SCNC: 102 MMOL/L (ref 98–107)
CO2 SERPL-SCNC: 26.1 MMOL/L (ref 22–29)
CREAT SERPL-MCNC: 1.03 MG/DL (ref 0.57–1)
DEPRECATED RDW RBC AUTO: 45.3 FL (ref 37–54)
EGFRCR SERPLBLD CKD-EPI 2021: 71.5 ML/MIN/1.73
ERYTHROCYTE [DISTWIDTH] IN BLOOD BY AUTOMATED COUNT: 14.1 % (ref 12.3–15.4)
GLOBULIN UR ELPH-MCNC: 3 GM/DL
GLUCOSE SERPL-MCNC: 111 MG/DL (ref 65–99)
HCT VFR BLD AUTO: 39.1 % (ref 34–46.6)
HGB BLD-MCNC: 12.2 G/DL (ref 12–15.9)
MCH RBC QN AUTO: 27.2 PG (ref 26.6–33)
MCHC RBC AUTO-ENTMCNC: 31.2 G/DL (ref 31.5–35.7)
MCV RBC AUTO: 87.1 FL (ref 79–97)
PLATELET # BLD AUTO: 283 10*3/MM3 (ref 140–450)
PMV BLD AUTO: 11.6 FL (ref 6–12)
POTASSIUM SERPL-SCNC: 3.7 MMOL/L (ref 3.5–5.2)
PROT SERPL-MCNC: 7.1 G/DL (ref 6–8.5)
RBC # BLD AUTO: 4.49 10*6/MM3 (ref 3.77–5.28)
SODIUM SERPL-SCNC: 139 MMOL/L (ref 136–145)
T4 FREE SERPL-MCNC: 1.25 NG/DL (ref 0.92–1.68)
TSH SERPL DL<=0.05 MIU/L-ACNC: 0.38 UIU/ML (ref 0.27–4.2)
WBC NRBC COR # BLD AUTO: 11.69 10*3/MM3 (ref 3.4–10.8)

## 2025-03-19 PROCEDURE — 84443 ASSAY THYROID STIM HORMONE: CPT | Performed by: INTERNAL MEDICINE

## 2025-03-19 PROCEDURE — 84439 ASSAY OF FREE THYROXINE: CPT | Performed by: INTERNAL MEDICINE

## 2025-03-19 PROCEDURE — 80053 COMPREHEN METABOLIC PANEL: CPT | Performed by: INTERNAL MEDICINE

## 2025-03-19 PROCEDURE — 85027 COMPLETE CBC AUTOMATED: CPT | Performed by: INTERNAL MEDICINE

## 2025-03-19 PROCEDURE — 99214 OFFICE O/P EST MOD 30 MIN: CPT | Performed by: INTERNAL MEDICINE

## 2025-03-19 NOTE — PROGRESS NOTES
Chief Complaint   Patient presents with    Graves' Disease     Follow-up     HPI:   Estela Angela is a 39 y.o.female who [facility] for f/u evaluation of graves disease. Last visit 11/12/2024. Her history is as follows:    Interim Events:  - Overall feeling better  - Currently breastfeeding. Is 16 months post-partum    1) Graves disease:   - first diagnosed in 2013, by Dr. Merritt. Has been on thionamide therapy intermittently (mainly methimazole) since 2013. Presented with palpitations, weight loss, and tremor.  - In 09/2016 had her first pregnancy (took PTU during that pregnancy). Postpartum was switched to methimazole.  - 18 months later, became pregnant with second child. Was on methimazole for a few weeks in the first trimester. Was switched to PTU. At 15 weeks, I switched to methimazole per [facility] guidelines.   - pt stopped the methimazole post-partum in 02/07/2018, TSH Rec Ab <0.50 in 03/2018  - restarted methimazole 5 mg daily in 08/2018: TSH <0.004, free T4 2.92 (0.89 - 1.76), T3 316  - had to increase her dose in 02/2019  - Patient was scheduled for thyroidectomy on October 1, 2019 [facility] with Dr. Shari Ambriz.  However as the surgery date approached, she decided not to pursue thyroidectomy.  Continued methimazole therapy    Current Rxs: methimazole 20 mg daily,  propranolol 20 mg per day   - Denies missed doses  - Denies fever, sore throat, or pruritus  - Wt gain    Review of Systems   Constitutional:  Negative for fatigue.        Weight gain   HENT: Negative.     Eyes: Negative.    Respiratory:  Negative for cough and shortness of breath.    Cardiovascular:  Positive for palpitations (occasional).   Gastrointestinal: Negative.  Negative for diarrhea.   Endocrine: Negative for heat intolerance.   Genitourinary: Negative.    Musculoskeletal: Negative.    Skin: Negative.    Allergic/Immunologic: Negative.    Neurological:  Negative for tremors.   Hematological: Negative.   "  Psychiatric/Behavioral: Negative.  Negative for sleep disturbance.      The following portions of the patient's history were reviewed and updated as appropriate: allergies, current medications, past family history, past medical history, past social history, past surgical history and problem list.      /72 (BP Location: Left arm, Patient Position: Sitting)   Pulse 78   Ht 162.6 cm (64.02\")   Wt 65.4 kg (144 lb 3.2 oz)   SpO2 100%   BMI 24.74 kg/m²   Physical Exam   Constitutional: She is oriented to person, place, and time. She appears well-developed. No distress.   HENT:   Head: Normocephalic.   Eyes: Pupils are equal, round, and reactive to light. Conjunctivae are normal.   Neck: No tracheal deviation present. Thyromegaly (moderate 40-50 gm gland) present.   No palpable thyroid nodules     Cardiovascular: Normal rate, regular rhythm and normal heart sounds.   No murmur heard.  Pulmonary/Chest: Effort normal and breath sounds normal. No respiratory distress.   Abdominal: Soft. Bowel sounds are normal. There is no abdominal tenderness.   Lymphadenopathy:     She has no cervical adenopathy.   Neurological: She is alert and oriented to person, place, and time. No cranial nerve deficit.   No Tremor in hands   Skin: Skin is warm and dry. She is not diaphoretic. No erythema.   Psychiatric: Her behavior is normal.   Vitals reviewed.      LABS/IMAGING: outside records reviewed and summarized in HPI  Results for orders placed or performed in visit on 03/19/25   CBC (No Diff)    Collection Time: 03/19/25 12:50 PM    Specimen: Blood   Result Value Ref Range    WBC 11.69 (H) 3.40 - 10.80 10*3/mm3    RBC 4.49 3.77 - 5.28 10*6/mm3    Hemoglobin 12.2 12.0 - 15.9 g/dL    Hematocrit 39.1 34.0 - 46.6 %    MCV 87.1 79.0 - 97.0 fL    MCH 27.2 26.6 - 33.0 pg    MCHC 31.2 (L) 31.5 - 35.7 g/dL    RDW 14.1 12.3 - 15.4 %    RDW-SD 45.3 37.0 - 54.0 fl    MPV 11.6 6.0 - 12.0 fL    Platelets 283 140 - 450 10*3/mm3   Comprehensive " Metabolic Panel    Collection Time: 03/19/25 12:50 PM    Specimen: Arm, Left; Blood   Result Value Ref Range    Glucose 111 (H) 65 - 99 mg/dL    BUN 19 6 - 20 mg/dL    Creatinine 1.03 (H) 0.57 - 1.00 mg/dL    Sodium 139 136 - 145 mmol/L    Potassium 3.7 3.5 - 5.2 mmol/L    Chloride 102 98 - 107 mmol/L    CO2 26.1 22.0 - 29.0 mmol/L    Calcium 9.6 8.6 - 10.5 mg/dL    Total Protein 7.1 6.0 - 8.5 g/dL    Albumin 4.1 3.5 - 5.2 g/dL    ALT (SGPT) 12 1 - 33 U/L    AST (SGOT) 13 1 - 32 U/L    Alkaline Phosphatase 142 (H) 39 - 117 U/L    Total Bilirubin <0.2 0.0 - 1.2 mg/dL    Globulin 3.0 gm/dL    A/G Ratio 1.4 g/dL    BUN/Creatinine Ratio 18.4 7.0 - 25.0    Anion Gap 10.9 5.0 - 15.0 mmol/L    eGFR 71.5 >60.0 mL/min/1.73   TSH    Collection Time: 03/19/25 12:50 PM    Specimen: Arm, Left; Blood   Result Value Ref Range    TSH 0.378 0.270 - 4.200 uIU/mL   T4, Free    Collection Time: 03/19/25 12:50 PM    Specimen: Arm, Left; Blood   Result Value Ref Range    Free T4 1.25 0.92 - 1.68 ng/dL     ASSESSMENT/PLAN:  1) Graves disease:  Clinically improving slowly. TFT's are now in the normal range  - We have discussed the risks versus benefits of treatment options for hyperthyroidism consisting of thionamide therapy versus radioactive iodine versus surgery. Discussed the rate of recurrence  vs remission rate with thionamide therapy and need for life-long thyroid hormone supplementation with both TRUONG ablation and surgery. Pt would like to stay on methimazole.   - Decrease methimazole to 20 mg daily, Monday - Friday  - Discontinue propranolol 20 mg per day  - Pt to contact me if she notes any worsening symptoms.     RTC 5 months    Electronically Signed: Vidya Sousa MD

## 2025-03-30 ENCOUNTER — RESULTS FOLLOW-UP (OUTPATIENT)
Dept: ENDOCRINOLOGY | Facility: CLINIC | Age: 40
End: 2025-03-30
Payer: COMMERCIAL

## 2025-03-30 RX ORDER — METHIMAZOLE 10 MG/1
20 TABLET ORAL
Status: SHIPPED
Start: 2025-03-31

## 2025-04-17 ENCOUNTER — TELEPHONE (OUTPATIENT)
Dept: INTERNAL MEDICINE | Facility: CLINIC | Age: 40
End: 2025-04-17
Payer: COMMERCIAL

## 2025-04-17 NOTE — TELEPHONE ENCOUNTER
Called patient, patient answered then call was dropped, attempted to call patient back and it went straight to .

## 2025-04-17 NOTE — TELEPHONE ENCOUNTER
ALEJANDRO CALLED AND WANTED TO KNOW IF AN MA COULD CALL HER BACK ABOUT INCREASING THE DOSAGE ON HER STRATTERA. HER NEW NUMBER [phone]

## 2025-04-21 ENCOUNTER — OFFICE VISIT (OUTPATIENT)
Dept: BEHAVIORAL HEALTH | Facility: CLINIC | Age: 40
End: 2025-04-21
Payer: COMMERCIAL

## 2025-04-21 VITALS
HEIGHT: 64 IN | OXYGEN SATURATION: 98 % | SYSTOLIC BLOOD PRESSURE: 112 MMHG | WEIGHT: 140 LBS | BODY MASS INDEX: 23.9 KG/M2 | HEART RATE: 85 BPM | DIASTOLIC BLOOD PRESSURE: 68 MMHG

## 2025-04-21 DIAGNOSIS — F33.1 MAJOR DEPRESSIVE DISORDER, RECURRENT EPISODE, MODERATE: ICD-10-CM

## 2025-04-21 DIAGNOSIS — F90.2 ATTENTION DEFICIT HYPERACTIVITY DISORDER, COMBINED TYPE: Primary | ICD-10-CM

## 2025-04-21 PROCEDURE — 96127 BRIEF EMOTIONAL/BEHAV ASSMT: CPT | Performed by: REGISTERED NURSE

## 2025-04-21 PROCEDURE — 99214 OFFICE O/P EST MOD 30 MIN: CPT | Performed by: REGISTERED NURSE

## 2025-04-21 RX ORDER — ATOMOXETINE 40 MG/1
40 CAPSULE ORAL DAILY
Qty: 30 CAPSULE | Refills: 0 | Status: SHIPPED | OUTPATIENT
Start: 2025-04-21

## 2025-04-21 NOTE — PROGRESS NOTES
Follow Up Office Visit      Patient Name: Estela Angela  : 1985   MRN: [id number]     Referring Provider: Hanna De La Vega APRN    Chief Complaint:      ICD-10-CM ICD-9-CM   1. Attention deficit hyperactivity disorder, combined type  F90.2 314.01   2. Major depressive disorder, recurrent episode, moderate  F33.1 296.32        History of Present Illness:   Estela Angela is a 39 y.o. female who is here today for follow up and medication management    Subjective      Patient Reports:   History of Present Illness  The patient presents for evaluation of ADHD.    She is considering increasing her medication dosage. She has been on a 25 mg dosage, which has been effective, but she feels that a higher dose may be beneficial due to the extended duration since her last adjustment. She believes that a higher dose may not induce the same level of discomfort as previously experienced. Significant benefits are still derived from the current dosage, but she feels that a slight increase may be beneficial.    - Currently breastfeeding her 1-year-old child      She reports her sleep pattern is generally satisfactory, with an average of 7 hours per night, leaving her feeling well-rested. There are no changes in appetite. Initial side effects of headaches and stomach issues have largely subsided, although they can recur if the medication is taken on an empty stomach. Medication is typically taken after breakfast or lunch, but she occasionally forgets to take it. Despite an improvement in motivation levels, she continues to struggle with focus and easy distractibility.      Review of Systems:   Review of Systems   Constitutional:  Negative for appetite change and unexpected weight change.   Eyes:  Negative for visual disturbance.   Respiratory:  Negative for chest tightness and shortness of breath.    Cardiovascular:  Negative for chest pain.   Musculoskeletal:  Negative for gait problem.   Skin:  Negative for rash  and wound.   Neurological:  Negative for dizziness, tremors, seizures, weakness and light-headedness.   Psychiatric/Behavioral:  Positive for decreased concentration. Negative for agitation, behavioral problems, confusion, dysphoric mood, hallucinations, self-injury, sleep disturbance and suicidal ideas. The patient is not nervous/anxious and is not hyperactive.      Sleep pattern: 7 hours  Appetite: no changes     PHQ-9 Depression Screening  Little interest or pleasure in doing things? Not at all   Feeling down, depressed, or hopeless? Not at all   PHQ-2 Total Score 0   Trouble falling or staying asleep, or sleeping too much? Several days   Feeling tired or having little energy? Several days   Poor appetite or overeating? Not at all   Feeling bad about yourself - or that you are a failure or have let yourself or your family down? Not at all   Trouble concentrating on things, such as reading the newspaper or watching television? Several days   Moving or speaking so slowly that other people could have noticed? Or the opposite - being so fidgety or restless that you have been moving around a lot more than usual? Not at all   Thoughts that you would be better off dead, or of hurting yourself in some way? Not at all   PHQ-9 Total Score 3   If you checked off any problems, how difficult have these problems made it for you to do your work, take care of things at home, or get along with other people? Somewhat difficult       DAX-7 Anxiety Screening  Over the last two weeks, how often have you been bothered by the following problems?  Feeling nervous, anxious or on edge: Not at all  Not being able to stop or control worrying: Not at all  Worrying too much about different things: Several days  Trouble Relaxing: Several days  Being so restless that it is hard to sit still: Several days  Becoming easily annoyed or irritable: Several days  Feeling afraid as if something awful might happen: Not at all  DAX 7 Total Score: 4  If  "you checked any problems, how difficult have these problems made it for you to do your work, take care of things at home, or get along with other people: Somewhat difficult    RISK ASSESSMENT:  Patient denies any thoughts or intent of suicide today. Patient denies any impulsive behavior today.     Medications:     Current Outpatient Medications:     methIMAzole (TAPAZOLE) 10 MG tablet, Take 2 tablets by mouth Daily (Monday-Friday)., Disp: , Rfl:     atomoxetine (STRATTERA) 40 MG capsule, Take 1 capsule by mouth Daily., Disp: 30 capsule, Rfl: 0    Medication Considerations:  GENET reviewed and appropriate.      Allergies:   No Known Allergies        Objective     Physical Exam:  Vital Signs:   Vitals:    04/21/25 1004 04/21/25 1006   BP:  112/68   Pulse:  85   SpO2:  98%   Weight: 63.5 kg (140 lb)    Height: 162.6 cm (64.02\")      Body mass index is 24.02 kg/m².     Mental Status Exam:   Hygiene:   good   Cooperation:  Cooperative  Eye Contact:  Good  Psychomotor Behavior:  Appropriate  Affect:  Appropriate  Mood: normal  Speech:  Normal  Thought Process:  Goal directed and Linear  Thought Content:  Normal  Suicidal:  None  Homicidal:  None  Hallucinations:  None  Delusion:  None  Memory:  Intact  Orientation:  Person, Place, Time, and Situation  Reliability:  good  Insight:  Good  Judgement:  Good  Impulse Control:  Good  Physical/Medical Issues:   see problem list      Assessment / Plan      Visit Diagnosis/Orders Placed This Visit:  Diagnoses and all orders for this visit:    1. Attention deficit hyperactivity disorder, combined type (Primary)  -     atomoxetine (STRATTERA) 40 MG capsule; Take 1 capsule by mouth Daily.  Dispense: 30 capsule; Refill: 0    2. Major depressive disorder, recurrent episode, moderate             Functional Status: No impairment    Prognosis: Good with Ongoing Treatment     Impression/Formulation:  Patient appeared alert and oriented.  Patient is voluntarily requesting to continue " outpatient psychiatric treatment at [facility].  Patient is receptive to assistance with maintaining a stable lifestyle.  Patient presents with history of     ICD-10-CM ICD-9-CM   1. Attention deficit hyperactivity disorder, combined type  F90.2 314.01   2. Major depressive disorder, recurrent episode, moderate  F33.1 296.32   .    Reviewed patient's previous provider notes. Reviewed most recent labs. Patient meets DSM V diagnostic criteria for diagnoses. Diagnoses may be updated as more information becomes available.     Assessment & Plan    - Discussed the patient's current medication regimen and the desire to increase the dosage of Strattera from 25 mg to 40 mg to improve focus and reduce distractibility.  - Explored the patient's experiences with sleep disturbances when taking medication late in the day.  - Addressed the importance of taking medication with food to avoid gastrointestinal discomfort.  - Reviewed the patient's breastfeeding status and its impact on medication management.    The patient has been on 25 mg of Strattera for nearly a year and reports significant improvement in motivation and reduction in the perception of tasks as burdensome. However, she continues to experience distractibility and seeks to increase the dosage to 40 mg. She reports no significant side effects such as heart palpitations, increased irritability, or agitation. Sleep disturbances occur when the medication is taken late in the day. Overall, the patient's mental health appears stable, and she is responsive to treatment.      - Psychoeducation was provided regarding the importance of taking medication with food and monitoring for potential side effects.    Plan:  - Increase Strattera dosage to 40 mg.  - Take medication with food and earlier in the day to avoid sleep disturbances.  - Monitor for side effects such as heart palpitations, increased irritability, or agitation.  - Contact the office  immediately if any adverse effects are experienced.    Follow-up:  - Follow-up appointment scheduled in 1 month to evaluate the response to the increased dosage and overall progress.    Notes & Risk Factors:  - The patient is still breastfeeding, which has been acknowledged and considered in the medication management plan.  - No additional risk factors for harm to self or others were identified.        Patient will continue supportive psychotherapy efforts and medications as indicated. Clinic will obtain release of information for current treatment team for continuity of care as needed. Patient will contact this office, call 911 or present to the nearest emergency room should suicidal or homicidal ideations occur.  Discussed medication options and treatment plan of prescribed medication(s) as well as the risks, benefits, and potential side effects. Patient acknowledged and verbally consented to continue with current treatment plan and was educated on the importance of compliance with treatment and follow-up appointments.     Patient instructions:  Instructed will take 4-6 weeks for full therapeutic effect. Medication risks and side effects discussed with patient including risk for worsening mood, changes in behavior, thoughts of suicide or homicide, induction of rk, serotonin syndrome.   If any thoughts of SI or HI, worsening mood or changes in behavior, call 911 or crisis line [phone], or go to nearest ER at once.  Pt.verbalizes understanding and consents to treatment with this medication.     Follow Up:   Return in about 1 month (around 5/21/2025) for Med Check.    Patient or patient representative verbalized consent for the use of Ambient Listening during the visit with  SHARON Mike for chart documentation. 4/21/2025  10:52 EDT    SHARON Mike, PMHNP-BC [facility]

## 2025-05-22 ENCOUNTER — OFFICE VISIT (OUTPATIENT)
Dept: BEHAVIORAL HEALTH | Facility: CLINIC | Age: 40
End: 2025-05-22
Payer: COMMERCIAL

## 2025-05-22 VITALS
SYSTOLIC BLOOD PRESSURE: 126 MMHG | DIASTOLIC BLOOD PRESSURE: 74 MMHG | BODY MASS INDEX: 24.24 KG/M2 | OXYGEN SATURATION: 98 % | WEIGHT: 142 LBS | HEIGHT: 64 IN | HEART RATE: 89 BPM

## 2025-05-22 DIAGNOSIS — F90.2 ATTENTION DEFICIT HYPERACTIVITY DISORDER, COMBINED TYPE: Primary | ICD-10-CM

## 2025-05-22 DIAGNOSIS — F33.1 MAJOR DEPRESSIVE DISORDER, RECURRENT EPISODE, MODERATE: ICD-10-CM

## 2025-05-22 RX ORDER — ATOMOXETINE 40 MG/1
40 CAPSULE ORAL DAILY
Qty: 90 CAPSULE | Refills: 0 | Status: SHIPPED | OUTPATIENT
Start: 2025-05-22

## 2025-05-22 NOTE — PROGRESS NOTES
Follow Up Office Visit      Patient Name: Estela Angela  : 1985   MRN: [id number]     Referring Provider: Hanna De La Vega APRN    Chief Complaint:      ICD-10-CM ICD-9-CM   1. Attention deficit hyperactivity disorder, combined type  F90.2 314.01   2. Major depressive disorder, recurrent episode, moderate  F33.1 296.32        History of Present Illness:   Estela Angela is a 39 y.o. female who is here today for follow up and medication management    Subjective      Patient Reports:   History of Present Illness  The patient presents for evaluation of ADHD.    She reports a positive response to the increased dosage of Strattera, with no significant side effects. She has observed an improvement in her focus and attention, and a decrease in irritability. Her sleep pattern is generally good, averaging 7 hours per night, and she feels well-rested upon waking. She acknowledges the need for relaxation and has been using an Oura ring to monitor her heart rate and stress levels. She finds solace in spending time alone in her room with her computer and enjoys reading, but doesn't get a chance to do either very often. She reports no changes in her appetite or eating habits. She also reports no suicidal ideation, homicidal thoughts, or hallucinations.    The patient reports improved focus and attention since the last visit, with no significant side effects from the increased dosage of Strattera. She has experienced a decrease in irritability and maintains a generally good sleep pattern, averaging 7 hours per night. She feels well-rested upon waking and acknowledges the need for relaxation. She uses an Oura ring to monitor her heart rate and stress levels, noting that it sometimes indicates stress even when she feels fine.        Review of Systems:   Review of Systems   Constitutional:  Negative for appetite change and unexpected weight change.   Eyes:  Negative for visual disturbance.   Respiratory:  Negative  for chest tightness and shortness of breath.    Cardiovascular:  Negative for chest pain.   Musculoskeletal:  Negative for gait problem.   Skin:  Negative for rash and wound.   Neurological:  Negative for dizziness, tremors, seizures, weakness and light-headedness.   Psychiatric/Behavioral:  Negative for agitation, behavioral problems, confusion, dysphoric mood, hallucinations, self-injury, sleep disturbance and suicidal ideas. The patient is not nervous/anxious and is not hyperactive.      Sleep pattern: sleeping well, well rested  Appetite: no changes     PHQ-9 Depression Screening  Little interest or pleasure in doing things? Not at all   Feeling down, depressed, or hopeless? Not at all   PHQ-2 Total Score 0   Trouble falling or staying asleep, or sleeping too much? Several days   Feeling tired or having little energy? Not at all   Poor appetite or overeating? Not at all   Feeling bad about yourself - or that you are a failure or have let yourself or your family down? Not at all   Trouble concentrating on things, such as reading the newspaper or watching television? Not at all   Moving or speaking so slowly that other people could have noticed? Or the opposite - being so fidgety or restless that you have been moving around a lot more than usual? Not at all   Thoughts that you would be better off dead, or of hurting yourself in some way? Not at all   PHQ-9 Total Score 1   If you checked off any problems, how difficult have these problems made it for you to do your work, take care of things at home, or get along with other people? Not difficult at all       DAX-7 Anxiety Screening  Over the last two weeks, how often have you been bothered by the following problems?  Feeling nervous, anxious or on edge: Several days  Not being able to stop or control worrying: Several days  Worrying too much about different things: Several days  Trouble Relaxing: Several days  Being so restless that it is hard to sit still: Not at  "all  Becoming easily annoyed or irritable: Several days  Feeling afraid as if something awful might happen: Several days  DAX 7 Total Score: 6  If you checked any problems, how difficult have these problems made it for you to do your work, take care of things at home, or get along with other people: Somewhat difficult    RISK ASSESSMENT:  Patient denies any thoughts or intent of suicide today. Patient denies any impulsive behavior today.     Medications:     Current Outpatient Medications:     atomoxetine (STRATTERA) 40 MG capsule, Take 1 capsule by mouth Daily., Disp: 90 capsule, Rfl: 0    methIMAzole (TAPAZOLE) 10 MG tablet, Take 2 tablets by mouth Daily (Monday-Friday)., Disp: , Rfl:     Medication Considerations:  GENET reviewed and appropriate.      Allergies:   No Known Allergies      Objective     Physical Exam:  Vital Signs:   Vitals:    05/22/25 1004 05/22/25 1009   BP:  126/74   Pulse:  89   SpO2:  98%   Weight: 64.4 kg (142 lb)    Height: 162.6 cm (64.02\")      Body mass index is 24.36 kg/m².     Mental Status Exam:   Hygiene:   good   Cooperation:  Cooperative  Eye Contact:  Good  Psychomotor Behavior:  Appropriate  Affect:  Appropriate  Mood: normal  Speech:  Normal  Thought Process:  Goal directed and Linear  Thought Content:  Normal  Suicidal:  None  Homicidal:  None  Hallucinations:  None  Delusion:  None  Memory:  Intact  Orientation:  Person, Place, Time, and Situation  Reliability:  good  Insight:  Good  Judgement:  Good  Impulse Control:  Good  Physical/Medical Issues:   see problem list      Assessment / Plan      Visit Diagnosis/Orders Placed This Visit:  Diagnoses and all orders for this visit:    1. Attention deficit hyperactivity disorder, combined type (Primary)  -     atomoxetine (STRATTERA) 40 MG capsule; Take 1 capsule by mouth Daily.  Dispense: 90 capsule; Refill: 0    2. Major depressive disorder, recurrent episode, moderate             Functional Status: No impairment    Prognosis: " Good with Ongoing Treatment     Impression/Formulation:  Patient appeared alert and oriented.  Patient is voluntarily requesting to continue outpatient psychiatric treatment at [facility].  Patient is receptive to assistance with maintaining a stable lifestyle.  Patient presents with history of     ICD-10-CM ICD-9-CM   1. Attention deficit hyperactivity disorder, combined type  F90.2 314.01   2. Major depressive disorder, recurrent episode, moderate  F33.1 296.32   .    Reviewed patient's previous provider notes. Reviewed most recent labs. Patient meets DSM V diagnostic criteria for diagnoses. Diagnoses may be updated as more information becomes available.     Assessment & Plan    - Discussed the patient's response to the increased dose of Strattera, noting significant improvement in focus and attention.  - Explored the patient's sleep patterns, reporting feeling well-rested with approximately 7 hours of sleep per night.      The patient demonstrates a positive response to the increased dose of Strattera, with significant improvement in focus and attention. She reports feeling well-rested and experiences no notable side effects, including irritability. The patient acknowledges the need to relax more and manage stress effectively. Overall, her mental health appears stable, and she does not exhibit any concerning symptoms.    Plan:  - Continue Strattera as prescribed, taking it with food to minimize gastrointestinal discomfort.      Follow-up:  - Next appointment scheduled for 08/28/2025 or sooner if needed.      Patient will continue supportive psychotherapy efforts and medications as indicated. Clinic will obtain release of information for current treatment team for continuity of care as needed. Patient will contact this office, call [phone] or present to the nearest emergency room should suicidal or homicidal ideations occur.  Discussed medication options and treatment plan of prescribed  medication(s) as well as the risks, benefits, and potential side effects. Patient acknowledged and verbally consented to continue with current treatment plan and was educated on the importance of compliance with treatment and follow-up appointments.     Patient instructions:  Medication risks and side effects discussed with patient including risk for worsening mood, changes in behavior, thoughts of suicide or homicide, induction of rk, serotonin syndrome, rare hyponatremia, rare SIADH, withdrawal symptoms if abrupt withdrawal, sexual dysfunction.   If any thoughts of SI or HI, worsening mood or changes in behavior, call 911 or crisis line [phone], or go to nearest ER at once. Pt.verbalizes understanding and consents to treatment with this medication.         Patient or patient representative verbalized consent for the use of Ambient Listening during the visit with  SHARON Mike for chart documentation. 5/22/2025  10:32 EDT    SHARON Mike, PMHNP-BC [facility]

## 2025-08-28 ENCOUNTER — TELEMEDICINE (OUTPATIENT)
Dept: BEHAVIORAL HEALTH | Facility: CLINIC | Age: 40
End: 2025-08-28
Payer: COMMERCIAL

## 2025-08-28 DIAGNOSIS — F33.1 MAJOR DEPRESSIVE DISORDER, RECURRENT EPISODE, MODERATE: Primary | ICD-10-CM

## 2025-08-28 DIAGNOSIS — F90.2 ATTENTION DEFICIT HYPERACTIVITY DISORDER, COMBINED TYPE: ICD-10-CM

## 2025-08-28 RX ORDER — ATOMOXETINE 40 MG/1
40 CAPSULE ORAL DAILY
Qty: 90 CAPSULE | Refills: 1 | Status: SHIPPED | OUTPATIENT
Start: 2025-08-28

## (undated) DEVICE — PK C/SECT 10

## (undated) DEVICE — SUT MONOCRYL SZ 4 0 18IN PS1 Y682H BX/36

## (undated) DEVICE — SOL IRR NACL 0.9PCT BT 1000ML

## (undated) DEVICE — SOL IRR H2O BTL 1000ML STRL

## (undated) DEVICE — GLV SURG SENSICARE W/ALOE PF LF 6.5 STRL

## (undated) DEVICE — SUT PLAIN  3/0 CT1 27IN 842H

## (undated) DEVICE — SKIN AFFIX SURG ADHESIVE 72/CS 0.55ML: Brand: MEDLINE

## (undated) DEVICE — 39" SINGLE PATIENT USE HOVERMATT: Brand: SINGLE PATIENT USE HOVERMATT

## (undated) DEVICE — TRY SPINE BLCK WHITACRE 25G 3X5IN

## (undated) DEVICE — SUT VIC 2/0 CT1 27IN J339H BX/36